# Patient Record
Sex: MALE | Race: WHITE | NOT HISPANIC OR LATINO | Employment: OTHER | URBAN - METROPOLITAN AREA
[De-identification: names, ages, dates, MRNs, and addresses within clinical notes are randomized per-mention and may not be internally consistent; named-entity substitution may affect disease eponyms.]

---

## 2017-03-27 ENCOUNTER — OFFICE VISIT (OUTPATIENT)
Dept: CARDIOLOGY | Facility: CLINIC | Age: 72
End: 2017-03-27

## 2017-03-27 VITALS
HEART RATE: 60 BPM | WEIGHT: 191.2 LBS | SYSTOLIC BLOOD PRESSURE: 102 MMHG | DIASTOLIC BLOOD PRESSURE: 62 MMHG | BODY MASS INDEX: 30.73 KG/M2 | HEIGHT: 66 IN

## 2017-03-27 DIAGNOSIS — I10 ESSENTIAL HYPERTENSION: ICD-10-CM

## 2017-03-27 DIAGNOSIS — I82.409 DEEP VEIN THROMBOSIS (DVT) OF LOWER EXTREMITY, UNSPECIFIED CHRONICITY, UNSPECIFIED LATERALITY, UNSPECIFIED VEIN (HCC): ICD-10-CM

## 2017-03-27 DIAGNOSIS — I25.810 CORONARY ARTERY DISEASE INVOLVING CORONARY BYPASS GRAFT OF NATIVE HEART WITHOUT ANGINA PECTORIS: Primary | ICD-10-CM

## 2017-03-27 DIAGNOSIS — I71.40 ABDOMINAL AORTIC ANEURYSM (AAA) WITHOUT RUPTURE (HCC): ICD-10-CM

## 2017-03-27 PROCEDURE — 99213 OFFICE O/P EST LOW 20 MIN: CPT | Performed by: NURSE PRACTITIONER

## 2017-03-27 RX ORDER — WARFARIN SODIUM 5 MG/1
5 TABLET ORAL
COMMUNITY
End: 2021-12-07 | Stop reason: HOSPADM

## 2017-03-27 NOTE — PROGRESS NOTES
Subjective:     Encounter Date:03/27/2017      Patient ID: Cal Frausto is a 71 y.o. male.    Chief Complaint: Follow-up for CAD, DVT, AAA.    PROBLEM LIST:  1.  Coronary artery disease:  a. April 1989, non-ST elevated  myocardial infarction with cardiac catheterization revealing an occluded LAD.  RCA within normal limits and within normal limits circumflex.   b. March 2004, abnormal Cardiolite with subsequent cardiac catheterization revealing multivessel disease.  c.  On 04/19/2004, coronary artery bypass grafting x3 with Dr. Cardona with a LIMA to the LAD, a saphenous vein graft to the ramus and OM1, and a saphenous vein graft to the PDA.   d. November 2004, cardiac catheterization which revealed an occluded saphenous  vein graft to the RCA.  There was an 80% left main with an occluded LAD with a patent LIMA graft to the LAD.  There was also an occluded OM1 with a patent saphenous vein graft to the OM.    e. Angina, cardiac cath, 07/30/2013, with 100% RCA.  Occluded  SVG.  With 100% LAD.  Patent LINDSEY with distal LAD occlusion.  Widely patent SVG to ramus and OM-1, filling other natives of the collaterals with normal LVEF.  2. DVT  a. Duplex venous lower extremity, 9/19/2016: · Acute left lower extremity deep vein thrombosis noted in the posterial tibial. Acute left lower extremity superficial thrombophlebitis noted in the greater saphenous (above knee) and varicosity (below knee). All other left sided veins appeared normal. Chronic left lower extremity superficial thrombophlebitis noted in the greater saphenous (below knee).  3. Hypertension.  4. Dyslipidemia.  5. AAA  a. Abdominal CT without contrast 9/18/2016: No acute findings.  Infrarenal AA unchanged.  b. Abdominal CT without contrast 5/17/2016: 4.6 x 4.3 cm infrarenal abdominal aortic aneurysm, no evidence of rupture.  6. Type 2 diabetes.   7. Ongoing tobacco abuse.  8. History of alcohol abuse, none since 1977.  9. COPD with nocturnal oxygen  use.  10. Arthritis.  11. Hypothyroidism.  12. History of childhood seizures.  13. Left wrist cyst removal x2.  14. Left carpal tunnel surgery.  15. Bell’s palsy in 2012.      History of Present Illness  Patient returns today for follow up with a history of CAD, hypertension, abdominal aortic aneurysm, and DVT.  Since last being seen he is overall done well.  His Xarelto therapy was switched to warfarin secondary to cost issues.  He notes that he has been doing well with this.  Currently denies any chest pain, pressure, tightness.  Denies any increasing shortness of breath.  Denies any syncope, near-syncope, edema.  He still continues to smoke cigarettes and per his report has no intentions of quitting.    No Known Allergies      Current Outpatient Prescriptions:   •  aspirin 81 MG tablet, Take 81 mg by mouth daily., Disp: , Rfl:   •  atenolol (TENORMIN) 50 MG tablet, Take 50 mg by mouth daily., Disp: , Rfl:   •  fenofibrate 160 MG tablet, Take 160 mg by mouth daily., Disp: , Rfl:   •  glipiZIDE (GLUCOTROL) 10 MG tablet, Take 10 mg by mouth daily., Disp: , Rfl:   •  isosorbide mononitrate (IMDUR) 60 MG 24 hr tablet, Take 60 mg by mouth daily., Disp: , Rfl:   •  levothyroxine (SYNTHROID, LEVOTHROID) 112 MCG tablet, Take 112 mcg by mouth daily., Disp: , Rfl:   •  losartan-hydrochlorothiazide (HYZAAR) 100-25 MG per tablet, Take 1 tablet by mouth daily., Disp: , Rfl:   •  PIOGLITAZONE HCL PO, Take 15 mg by mouth daily., Disp: , Rfl:   •  rivaroxaban (XARELTO) 15 MG tablet, Take 20 mg by mouth daily., Disp: , Rfl:   •  rosuvastatin (CRESTOR) 20 MG tablet, Take 1 tablet by mouth daily., Disp: 90 tablet, Rfl: 3  •  thyroid (ARMOUR) 15 MG tablet, Take 15 mg by mouth daily., Disp: , Rfl:     The following portions of the patient's history were reviewed and updated as appropriate: allergies, current medications, past family history, past medical history, past social history, past surgical history and problem list.    Review  "of Systems   Constitution: Negative.   Cardiovascular: Negative.    Respiratory: Negative.    Hematologic/Lymphatic: Negative for bleeding problem. Does not bruise/bleed easily.   Skin: Negative for rash.   Musculoskeletal: Negative for muscle weakness and myalgias.   Gastrointestinal: Negative for heartburn, nausea and vomiting.   Neurological: Negative.           Objective:   /62 (BP Location: Left arm, Patient Position: Sitting)  Pulse 60  Ht 66\" (167.6 cm)  Wt 191 lb 3.2 oz (86.7 kg)  BMI 30.86 kg/m2      Physical Exam   Constitutional: He is oriented to person, place, and time. He appears well-developed and well-nourished.   HENT:   Mouth/Throat: Oropharynx is clear and moist.   Neck: No JVD present. Carotid bruit is not present. No thyromegaly present.   Cardiovascular: Regular rhythm, S1 normal, S2 normal, normal heart sounds and intact distal pulses.    Pulmonary/Chest: Breath sounds normal.   Abdominal: Soft. Bowel sounds are normal. He exhibits no mass. There is no tenderness.   Musculoskeletal: He exhibits no edema.   Neurological: He is alert and oriented to person, place, and time.   Skin: Skin is warm and dry. No rash noted.       Lab Review:    Procedures        Assessment:   Diagnoses and all orders for this visit:    Coronary artery disease involving coronary bypass graft of native heart without angina pectoris    Acute deep vein thrombosis (DVT) of left lower extremity, unspecified vein    Essential hypertension    Abdominal aortic aneurysm (AAA) without rupture        Plan:  1. We'll make no changes to the patient's current medication regimen at this time.  2. Encouraged  And discussed smoking cessation.  Patient notes he is not interested.  3. Revisit in 6 MO with a CTA of his abdomen and pelvis to follow-up on his infrarenal abdominal aneurysm.  4. Lifelong anticoagulation    PEGGY Jj    "

## 2017-10-16 ENCOUNTER — HOSPITAL ENCOUNTER (OUTPATIENT)
Dept: CT IMAGING | Facility: HOSPITAL | Age: 72
Discharge: HOME | End: 2017-10-16
Admitting: NURSE PRACTITIONER

## 2017-10-16 ENCOUNTER — OFFICE VISIT (OUTPATIENT)
Dept: CARDIOLOGY | Facility: CLINIC | Age: 72
End: 2017-10-16

## 2017-10-16 VITALS
DIASTOLIC BLOOD PRESSURE: 78 MMHG | SYSTOLIC BLOOD PRESSURE: 140 MMHG | BODY MASS INDEX: 31.34 KG/M2 | WEIGHT: 195 LBS | HEIGHT: 66 IN | HEART RATE: 56 BPM | OXYGEN SATURATION: 96 %

## 2017-10-16 DIAGNOSIS — I82.402 ACUTE DEEP VEIN THROMBOSIS (DVT) OF LEFT LOWER EXTREMITY, UNSPECIFIED VEIN (HCC): ICD-10-CM

## 2017-10-16 DIAGNOSIS — E78.5 DYSLIPIDEMIA: ICD-10-CM

## 2017-10-16 DIAGNOSIS — I10 ESSENTIAL HYPERTENSION: ICD-10-CM

## 2017-10-16 DIAGNOSIS — I25.810 CORONARY ARTERY DISEASE INVOLVING CORONARY BYPASS GRAFT OF NATIVE HEART WITHOUT ANGINA PECTORIS: Primary | ICD-10-CM

## 2017-10-16 DIAGNOSIS — I71.40 ABDOMINAL AORTIC ANEURYSM (AAA) WITHOUT RUPTURE (HCC): ICD-10-CM

## 2017-10-16 PROCEDURE — 74174 CTA ABD&PLVS W/CONTRAST: CPT

## 2017-10-16 PROCEDURE — 99214 OFFICE O/P EST MOD 30 MIN: CPT | Performed by: INTERNAL MEDICINE

## 2017-10-16 PROCEDURE — 0 IOPAMIDOL PER 1 ML: Performed by: NURSE PRACTITIONER

## 2017-10-16 PROCEDURE — 82565 ASSAY OF CREATININE: CPT

## 2017-10-16 RX ORDER — WARFARIN SODIUM 4 MG/1
TABLET ORAL
Status: ON HOLD | COMMUNITY
Start: 2017-10-09 | End: 2021-12-06

## 2017-10-16 RX ADMIN — IOPAMIDOL 95 ML: 755 INJECTION, SOLUTION INTRAVENOUS at 10:54

## 2017-10-16 NOTE — PROGRESS NOTES
Subjective:     Encounter Date:10/16/2017      Patient ID: Cal Frausto is a 72 y.o. male.    Chief Complaint: Coronary Artery Disease    PROBLEM LIST:  1.  Coronary artery disease:  a. April 1989, non-ST elevated  myocardial infarction with cardiac catheterization revealing an occluded LAD.  RCA within normal limits and within normal limits circumflex.   b. March 2004, abnormal Cardiolite with subsequent cardiac catheterization revealing multivessel disease.  c.  On 04/19/2004, coronary artery bypass grafting x3 with Dr. Cardona with a LIMA to the LAD, a saphenous vein graft to the ramus and OM1, and a saphenous vein graft to the PDA.   d. November 2004, cardiac catheterization which revealed an occluded saphenous  vein graft to the RCA.  There was an 80% left main with an occluded LAD with a patent LIMA graft to the LAD.  There was also an occluded OM1 with a patent saphenous vein graft to the OM.    e. Angina, cardiac cath, 07/30/2013, with 100% RCA.  Occluded  SVG.  With 100% LAD.  Patent LINDSEY with distal LAD occlusion.  Widely patent SVG to ramus and OM-1, filling other natives of the collaterals with normal LVEF.  2. DVT  a. Idiopathic Duplex venous lower extremity, 9/19/2016: ·Acute left lower extremity deep vein thrombosis noted in the posterial tibial. Acute left lower extremity superficial thrombophlebitis noted in the greater saphenous (above knee) and varicosity (below knee). Chronic left lower extremity superficial thrombophlebitis noted in the greater saphenous (below knee).  3. Hypertension.  4. Dyslipidemia.  5. AAA  a. CT, 10/16/17.  5.0 cm infrarenal AAA  b. Abdominal CT without contrast 5/17/2016: 4.6 x 4.3 cm infrarenal abdominal aortic aneurysm, no evidence of rupture.  6. Type 2 diabetes.   7. Ongoing tobacco abuse.  8. History of alcohol abuse, none since 1977.  9. COPD with nocturnal oxygen use.  10. Arthritis.  11. Hypothyroidism.  12. History of childhood seizures.  13. Left wrist cyst  removal x2.  14. Left carpal tunnel surgery.  15. Bell’s palsy in 2012.    History of Present Illness  Cal A High returns today for a 6 month follow up with a history of coronary artery disease, DVT, hypertension and dyslipidemia. Since last visit he has been doing well from a cardiovascular standpoint. Denies any exertional chest pain, shortness of breath, orthopnea, PND, or palpitations.  Patient feels good    No Known Allergies      Current Outpatient Prescriptions:   •  aspirin 81 MG tablet, Take 81 mg by mouth daily., Disp: , Rfl:   •  atenolol (TENORMIN) 50 MG tablet, Take 50 mg by mouth daily., Disp: , Rfl:   •  fenofibrate 160 MG tablet, Take 160 mg by mouth daily., Disp: , Rfl:   •  glipiZIDE (GLUCOTROL) 10 MG tablet, Take 10 mg by mouth daily., Disp: , Rfl:   •  isosorbide mononitrate (IMDUR) 60 MG 24 hr tablet, Take 60 mg by mouth daily., Disp: , Rfl:   •  levothyroxine (SYNTHROID, LEVOTHROID) 112 MCG tablet, Take 112 mcg by mouth daily., Disp: , Rfl:   •  losartan-hydrochlorothiazide (HYZAAR) 100-25 MG per tablet, Take 1 tablet by mouth daily., Disp: , Rfl:   •  O2 (OXYGEN), Inhale 2 L/min Every Night., Disp: , Rfl:   •  PIOGLITAZONE HCL PO, Take 15 mg by mouth daily., Disp: , Rfl:   •  rosuvastatin (CRESTOR) 20 MG tablet, Take 1 tablet by mouth daily., Disp: 90 tablet, Rfl: 3  •  thyroid (ARMOUR) 15 MG tablet, Take 15 mg by mouth daily., Disp: , Rfl:   •  warfarin (COUMADIN) 3 MG tablet, Take 3 mg by mouth Daily., Disp: , Rfl:   No current facility-administered medications for this visit.     The following portions of the patient's history were reviewed and updated as appropriate: allergies, current medications, past family history, past medical history, past social history, past surgical history and problem list.    Review of Systems   Constitution: Negative.   Cardiovascular: Negative.    Respiratory: Negative.    Hematologic/Lymphatic: Negative for bleeding problem. Does not bruise/bleed easily.    Skin: Negative for rash.   Musculoskeletal: Negative for muscle weakness and myalgias.   Gastrointestinal: Negative for heartburn, nausea and vomiting.   Neurological: Negative.         Objective:   There were no vitals filed for this visit.      Physical Exam   Constitutional: He is oriented to person, place, and time. He appears well-developed and well-nourished.   HENT:   Mouth/Throat: Oropharynx is clear and moist.   Neck: No JVD present. Carotid bruit is not present. No thyromegaly present.   Cardiovascular: Regular rhythm, S1 normal, S2 normal, normal heart sounds and intact distal pulses.  Exam reveals no gallop, no S3 and no S4.    No murmur heard.  Pulses:       Carotid pulses are 2+ on the right side, and 2+ on the left side.       Radial pulses are 2+ on the right side, and 2+ on the left side.   Pulmonary/Chest: Breath sounds normal.   Abdominal: Soft. Bowel sounds are normal. He exhibits no mass. There is no tenderness.   Musculoskeletal: He exhibits no edema.   Neurological: He is alert and oriented to person, place, and time.   Skin: Skin is warm and dry. No rash noted.     Lab Review:    Procedures        Assessment:   Cal was seen today for coronary artery disease.    Diagnoses and all orders for this visit:    Coronary artery disease involving coronary bypass graft of native heart without angina pectoris    Essential hypertension    Acute deep vein thrombosis (DVT) of left lower extremity, unspecified vein    Impression:  1. CAD Stable without angina  2. AAA appears to be slowly enlarging, now 5.0 cm.  No evidence of rupture.  3. Hypertension is well-controlled.  4. 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  5. Dyslipidemia controlled    Plan:  1. Continue current medications.  2. Referred to Dr. Isabel for EVAR  3. Revisit in 6 MO, or sooner as needed.    Scribed for Keegan Aguilera MD by Cole Han. 10/16/2017  1:42 PM    Keegan VALENZUELA MD, personally performed the  services described in this documentation as scribed by the above individual in my presence, and it is both accurate and complete      Please note that portions of this note may have been completed with a voice recognition program. Efforts were made to edit the dictations, but occasionally words are mistranscribed.

## 2017-10-18 LAB — CREAT BLDA-MCNC: 1.3 MG/DL (ref 0.6–1.3)

## 2018-04-16 ENCOUNTER — TELEPHONE (OUTPATIENT)
Dept: CARDIOLOGY | Facility: CLINIC | Age: 73
End: 2018-04-16

## 2018-04-16 RX ORDER — NITROGLYCERIN 0.4 MG/1
TABLET SUBLINGUAL
Qty: 25 TABLET | Refills: 3 | Status: SHIPPED | OUTPATIENT
Start: 2018-04-16

## 2018-04-16 NOTE — TELEPHONE ENCOUNTER
Got a call from Dr. Harsha Smith's office and says that he was mowing the yard had some angina.   apparently he had some NTG which was old so he threw it away.  I talk with the patient and he says that he has had some of this chest pain ever since he had the CABG.   Did order him so new NTG and instructed him to make sure he has it with him and especially when mowing.  He does have an appointment on 4/30/18.

## 2018-04-30 ENCOUNTER — OFFICE VISIT (OUTPATIENT)
Dept: CARDIOLOGY | Facility: CLINIC | Age: 73
End: 2018-04-30

## 2018-04-30 VITALS
WEIGHT: 199.8 LBS | HEART RATE: 61 BPM | DIASTOLIC BLOOD PRESSURE: 64 MMHG | BODY MASS INDEX: 32.11 KG/M2 | SYSTOLIC BLOOD PRESSURE: 112 MMHG | HEIGHT: 66 IN

## 2018-04-30 DIAGNOSIS — I71.40 ABDOMINAL AORTIC ANEURYSM (AAA) WITHOUT RUPTURE (HCC): ICD-10-CM

## 2018-04-30 DIAGNOSIS — I10 ESSENTIAL HYPERTENSION: ICD-10-CM

## 2018-04-30 DIAGNOSIS — E78.5 DYSLIPIDEMIA: ICD-10-CM

## 2018-04-30 DIAGNOSIS — I25.810 CORONARY ARTERY DISEASE INVOLVING CORONARY BYPASS GRAFT OF NATIVE HEART WITHOUT ANGINA PECTORIS: Primary | ICD-10-CM

## 2018-04-30 DIAGNOSIS — Z72.0 TOBACCO ABUSE: ICD-10-CM

## 2018-04-30 DIAGNOSIS — I82.402 ACUTE DEEP VEIN THROMBOSIS (DVT) OF LEFT LOWER EXTREMITY, UNSPECIFIED VEIN (HCC): ICD-10-CM

## 2018-04-30 PROCEDURE — 99214 OFFICE O/P EST MOD 30 MIN: CPT | Performed by: INTERNAL MEDICINE

## 2018-04-30 NOTE — PROGRESS NOTES
.    Subjective:     Encounter Date:04/30/2018      Patient ID: Cal Frausto is a 72 y.o. male.  PCP:Harsha Smith MD    Chief Complaint: Coronary Artery Disease; Hypertension; and Shortness of Breath      PROBLEM LIST:  1.  Coronary artery disease:  a. April 1989, non-ST elevated  myocardial infarction with cardiac catheterization revealing an occluded LAD.  RCA within normal limits and within normal limits circumflex.   b. March 2004, abnormal Cardiolite with subsequent cardiac catheterization revealing multivessel disease.  c.  On 04/19/2004, coronary artery bypass grafting x3 with Dr. Cardona with a LIMA to the LAD, a saphenous vein graft to the ramus and OM1, and a saphenous vein graft to the PDA.   d. November 2004, cardiac catheterization which revealed an occluded saphenous  vein graft to the RCA.  There was an 80% left main with an occluded LAD with a patent LIMA graft to the LAD.  There was also an occluded OM1 with a patent saphenous vein graft to the OM.    e. Angina, cardiac cath, 07/30/2013, with 100% RCA.  Occluded  SVG.  With 100% LAD.  Patent LINDSEY with distal LAD occlusion.  Widely patent SVG to ramus and OM-1, filling other natives of the collaterals with normal LVEF.  2. DVT  a. Idiopathic Duplex venous lower extremity, 9/19/2016: ·Acute left lower extremity deep vein thrombosis noted in the posterial tibial. Acute left lower extremity superficial thrombophlebitis noted in the greater saphenous (above knee) and varicosity (below knee). Chronic left lower extremity superficial thrombophlebitis noted in the greater saphenous (below knee).  3. Hypertension.  4. Dyslipidemia.  5. AAA  a. CT, 10/16/17.  5.0 cm infrarenal AAA, followed by Dr. Isabel  6. Type 2 diabetes.   7. Ongoing tobacco abuse.  8. History of alcohol abuse, none since 1977.  9. COPD with nocturnal oxygen use.  10. Arthritis.  11. Hypothyroidism.  12. History of childhood seizures.  13. Left wrist cyst removal x2.  14. Left  carpal tunnel surgery.  15. Bell’s palsy in 2012.    History of Present Illness  Patient returns today for follow up with a history of CAD, DVT, hypertension, and dyslipidemia. Since last visit had an episode of chest pain while mowing his lawn. Experienced this episode long term through mowing his lawn, and continued despite his chest pain. His last episode before this most recent episode, was a couple years ago. His pain resolved spontaneously. Per wife, he breathes heavily and gets confused often. Denies any orthopnea, PND, or palpitations.  This is identical to his previous angina.    No Known Allergies      Current Outpatient Prescriptions:   •  aspirin 81 MG tablet, Take 81 mg by mouth daily., Disp: , Rfl:   •  atenolol (TENORMIN) 50 MG tablet, Take 50 mg by mouth daily., Disp: , Rfl:   •  fenofibrate 160 MG tablet, Take 160 mg by mouth daily., Disp: , Rfl:   •  glipiZIDE (GLUCOTROL) 10 MG tablet, Take 10 mg by mouth daily., Disp: , Rfl:   •  isosorbide mononitrate (IMDUR) 60 MG 24 hr tablet, Take 60 mg by mouth daily., Disp: , Rfl:   •  levothyroxine (SYNTHROID, LEVOTHROID) 112 MCG tablet, Take 112 mcg by mouth daily., Disp: , Rfl:   •  losartan-hydrochlorothiazide (HYZAAR) 100-25 MG per tablet, Take 1 tablet by mouth daily., Disp: , Rfl:   •  nitroglycerin (NITROSTAT) 0.4 MG SL tablet, 1 under the tongue as needed for angina, may repeat q5mins for up three doses, Disp: 25 tablet, Rfl: 3  •  O2 (OXYGEN), Inhale 2 L/min Every Night., Disp: , Rfl:   •  PIOGLITAZONE HCL PO, Take 15 mg by mouth daily., Disp: , Rfl:   •  rosuvastatin (CRESTOR) 20 MG tablet, Take 1 tablet by mouth daily., Disp: 90 tablet, Rfl: 3  •  thyroid (ARMOUR) 15 MG tablet, Take 15 mg by mouth daily., Disp: , Rfl:   •  warfarin (COUMADIN) 3 MG tablet, Take 3 mg by mouth Daily., Disp: , Rfl:   •  warfarin (COUMADIN) 4 MG tablet, Alternating with 3 mg, Disp: , Rfl:     The following portions of the patient's history were reviewed and updated as  "appropriate: allergies, current medications, past family history, past medical history, past social history, past surgical history and problem list.    Review of Systems   Constitution: Negative.   Cardiovascular: Positive for chest pain.   Respiratory: Negative.    Hematologic/Lymphatic: Negative for bleeding problem. Does not bruise/bleed easily.   Skin: Negative for rash.   Musculoskeletal: Negative for muscle weakness and myalgias.   Gastrointestinal: Negative for heartburn, nausea and vomiting.   Neurological: Negative.           Objective:     Vitals:    04/30/18 1431   BP: 112/64   BP Location: Left arm   Patient Position: Sitting   Pulse: 61   Weight: 90.6 kg (199 lb 12.8 oz)   Height: 167.6 cm (66\")         Physical Exam   Constitutional: He is oriented to person, place, and time. He appears well-developed and well-nourished.   HENT:   Mouth/Throat: Oropharynx is clear and moist.   Neck: No JVD present. Carotid bruit is not present. No thyromegaly present.   Cardiovascular: Regular rhythm, S1 normal, S2 normal, normal heart sounds and intact distal pulses.  Exam reveals no gallop, no S3 and no S4.    No murmur heard.  Pulses:       Carotid pulses are 2+ on the right side, and 2+ on the left side.       Radial pulses are 2+ on the right side, and 2+ on the left side.   Positive left Rafael's   Pulmonary/Chest: Breath sounds normal.   Abdominal: Soft. Bowel sounds are normal. He exhibits no mass. There is no tenderness.   Musculoskeletal: He exhibits no edema.   Neurological: He is alert and oriented to person, place, and time.   Skin: Skin is warm and dry. No rash noted.       Lab Review:    Procedures        Assessment:   Cal was seen today for coronary artery disease, hypertension and shortness of breath.    Diagnoses and all orders for this visit:    Coronary artery disease involving coronary bypass graft of native heart without angina pectoris    Essential hypertension    Dyslipidemia    Acute deep " vein thrombosis (DVT) of left lower extremity, unspecified vein        Impression  1. CAD: New-onset functional class III angina.  Unstable pattern, known diffuse vascular disease with single functioning graft.  2. Hypertension: Well controlled with current medication regimen.  3. Dyslipidemia: LDL goal < 70. Continue rosuvastatin (Crestor) 20 mg daily.  4.     Plan:  1. Schedule cardiac cath to assess etiology of his chest pain.  Via left radial artery Hold coumadin 2 days before.  2. Restrict activity.  3. Continue current medications.  4. Revisit after procedure, or sooner as needed.    Scribed for Keegan Aguilera MD by Jessee Castrejon. 4/30/2018  2:56 PM    I, Keegan Aguilera MD, personally performed the services described in this documentation as scribed by the above individual in my presence, and it is both accurate and complete      Please note that portions of this note may have been completed with a voice recognition program. Efforts were made to edit the dictations, but occasionally words are mistranscribed.

## 2018-05-01 PROBLEM — I25.810 CORONARY ARTERY DISEASE INVOLVING CORONARY BYPASS GRAFT OF NATIVE HEART WITHOUT ANGINA PECTORIS: Status: ACTIVE | Noted: 2018-05-01

## 2018-05-02 ENCOUNTER — PREP FOR SURGERY (OUTPATIENT)
Dept: OTHER | Facility: HOSPITAL | Age: 73
End: 2018-05-02

## 2018-05-02 DIAGNOSIS — I20.9 ANGINA PECTORIS (HCC): Primary | ICD-10-CM

## 2018-05-02 RX ORDER — NITROGLYCERIN 0.4 MG/1
0.4 TABLET SUBLINGUAL
Status: CANCELLED | OUTPATIENT
Start: 2018-05-02

## 2018-05-02 RX ORDER — ASPIRIN 325 MG
325 TABLET ORAL ONCE
Status: CANCELLED | OUTPATIENT
Start: 2018-05-02 | End: 2018-05-02

## 2018-05-02 RX ORDER — ONDANSETRON 2 MG/ML
4 INJECTION INTRAMUSCULAR; INTRAVENOUS EVERY 6 HOURS PRN
Status: CANCELLED | OUTPATIENT
Start: 2018-05-02

## 2018-05-02 RX ORDER — ACETAMINOPHEN 325 MG/1
650 TABLET ORAL EVERY 4 HOURS PRN
Status: CANCELLED | OUTPATIENT
Start: 2018-05-02

## 2018-05-02 RX ORDER — ASPIRIN 325 MG
325 TABLET, DELAYED RELEASE (ENTERIC COATED) ORAL DAILY
Status: CANCELLED | OUTPATIENT
Start: 2018-05-03

## 2018-05-02 RX ORDER — SODIUM CHLORIDE 0.9 % (FLUSH) 0.9 %
1-10 SYRINGE (ML) INJECTION AS NEEDED
Status: CANCELLED | OUTPATIENT
Start: 2018-05-02

## 2018-05-04 ENCOUNTER — HOSPITAL ENCOUNTER (OUTPATIENT)
Facility: HOSPITAL | Age: 73
Setting detail: HOSPITAL OUTPATIENT SURGERY
Discharge: HOME | End: 2018-05-04
Attending: INTERNAL MEDICINE | Admitting: INTERNAL MEDICINE

## 2018-05-04 ENCOUNTER — APPOINTMENT (OUTPATIENT)
Dept: GENERAL RADIOLOGY | Facility: HOSPITAL | Age: 73
End: 2018-05-04

## 2018-05-04 VITALS
BODY MASS INDEX: 31.64 KG/M2 | HEART RATE: 64 BPM | SYSTOLIC BLOOD PRESSURE: 112 MMHG | WEIGHT: 196.87 LBS | TEMPERATURE: 97.8 F | RESPIRATION RATE: 16 BRPM | HEIGHT: 66 IN | DIASTOLIC BLOOD PRESSURE: 86 MMHG | OXYGEN SATURATION: 92 %

## 2018-05-04 DIAGNOSIS — I20.9 ANGINA PECTORIS (HCC): ICD-10-CM

## 2018-05-04 DIAGNOSIS — I25.810 CORONARY ARTERY DISEASE INVOLVING CORONARY BYPASS GRAFT OF NATIVE HEART WITHOUT ANGINA PECTORIS: ICD-10-CM

## 2018-05-04 LAB
ALBUMIN SERPL-MCNC: 4.2 G/DL (ref 3.2–4.8)
ALBUMIN/GLOB SERPL: 1.7 G/DL (ref 1.5–2.5)
ALP SERPL-CCNC: 44 U/L (ref 25–100)
ALT SERPL W P-5'-P-CCNC: 16 U/L (ref 7–40)
ANION GAP SERPL CALCULATED.3IONS-SCNC: 5 MMOL/L (ref 3–11)
ARTICHOKE IGE QN: 110 MG/DL (ref 0–130)
AST SERPL-CCNC: 17 U/L (ref 0–33)
BILIRUB SERPL-MCNC: 0.4 MG/DL (ref 0.3–1.2)
BUN BLD-MCNC: 20 MG/DL (ref 9–23)
BUN/CREAT SERPL: 18.2 (ref 7–25)
CALCIUM SPEC-SCNC: 9.1 MG/DL (ref 8.7–10.4)
CHLORIDE SERPL-SCNC: 100 MMOL/L (ref 99–109)
CHOLEST SERPL-MCNC: 163 MG/DL (ref 0–200)
CO2 SERPL-SCNC: 30 MMOL/L (ref 20–31)
CREAT BLD-MCNC: 1.1 MG/DL (ref 0.6–1.3)
DEPRECATED RDW RBC AUTO: 53.6 FL (ref 37–54)
ERYTHROCYTE [DISTWIDTH] IN BLOOD BY AUTOMATED COUNT: 16.6 % (ref 11.3–14.5)
GFR SERPL CREATININE-BSD FRML MDRD: 66 ML/MIN/1.73
GLOBULIN UR ELPH-MCNC: 2.5 GM/DL
GLUCOSE BLD-MCNC: 132 MG/DL (ref 70–100)
GLUCOSE BLDC GLUCOMTR-MCNC: 90 MG/DL (ref 70–130)
HBA1C MFR BLD: 7.3 % (ref 4.8–5.6)
HCT VFR BLD AUTO: 46.2 % (ref 38.9–50.9)
HDLC SERPL-MCNC: 37 MG/DL (ref 40–60)
HGB BLD-MCNC: 15 G/DL (ref 13.1–17.5)
INR PPP: 1.1 (ref 0.91–1.09)
MCH RBC QN AUTO: 28.7 PG (ref 27–31)
MCHC RBC AUTO-ENTMCNC: 32.5 G/DL (ref 32–36)
MCV RBC AUTO: 88.3 FL (ref 80–99)
PLATELET # BLD AUTO: 153 10*3/MM3 (ref 150–450)
PMV BLD AUTO: 11 FL (ref 6–12)
POTASSIUM BLD-SCNC: 3.8 MMOL/L (ref 3.5–5.5)
PROT SERPL-MCNC: 6.7 G/DL (ref 5.7–8.2)
PROTHROMBIN TIME: 11.5 SECONDS (ref 9.6–11.5)
RBC # BLD AUTO: 5.23 10*6/MM3 (ref 4.2–5.76)
SODIUM BLD-SCNC: 135 MMOL/L (ref 132–146)
TRIGL SERPL-MCNC: 151 MG/DL (ref 0–150)
WBC NRBC COR # BLD: 6.72 10*3/MM3 (ref 3.5–10.8)

## 2018-05-04 PROCEDURE — C1769 GUIDE WIRE: HCPCS | Performed by: INTERNAL MEDICINE

## 2018-05-04 PROCEDURE — 80061 LIPID PANEL: CPT | Performed by: NURSE PRACTITIONER

## 2018-05-04 PROCEDURE — C9600 PERC DRUG-EL COR STENT SING: HCPCS | Performed by: INTERNAL MEDICINE

## 2018-05-04 PROCEDURE — 85610 PROTHROMBIN TIME: CPT | Performed by: NURSE PRACTITIONER

## 2018-05-04 PROCEDURE — 93459 L HRT ART/GRFT ANGIO: CPT | Performed by: INTERNAL MEDICINE

## 2018-05-04 PROCEDURE — C1887 CATHETER, GUIDING: HCPCS | Performed by: INTERNAL MEDICINE

## 2018-05-04 PROCEDURE — C1894 INTRO/SHEATH, NON-LASER: HCPCS | Performed by: INTERNAL MEDICINE

## 2018-05-04 PROCEDURE — 83036 HEMOGLOBIN GLYCOSYLATED A1C: CPT | Performed by: NURSE PRACTITIONER

## 2018-05-04 PROCEDURE — 82962 GLUCOSE BLOOD TEST: CPT

## 2018-05-04 PROCEDURE — 0 IOPAMIDOL PER 1 ML: Performed by: INTERNAL MEDICINE

## 2018-05-04 PROCEDURE — 36415 COLL VENOUS BLD VENIPUNCTURE: CPT

## 2018-05-04 PROCEDURE — 71045 X-RAY EXAM CHEST 1 VIEW: CPT

## 2018-05-04 PROCEDURE — 25010000002 MIDAZOLAM PER 1 MG: Performed by: INTERNAL MEDICINE

## 2018-05-04 PROCEDURE — C1725 CATH, TRANSLUMIN NON-LASER: HCPCS | Performed by: INTERNAL MEDICINE

## 2018-05-04 PROCEDURE — 92928 PRQ TCAT PLMT NTRAC ST 1 LES: CPT | Performed by: INTERNAL MEDICINE

## 2018-05-04 PROCEDURE — 85027 COMPLETE CBC AUTOMATED: CPT | Performed by: NURSE PRACTITIONER

## 2018-05-04 PROCEDURE — 25010000002 BIVALIRUDIN TRIFLUOROACETATE 250 MG RECONSTITUTED SOLUTION 1 EACH VIAL: Performed by: INTERNAL MEDICINE

## 2018-05-04 PROCEDURE — 25010000002 HEPARIN (PORCINE) PER 1000 UNITS: Performed by: INTERNAL MEDICINE

## 2018-05-04 PROCEDURE — C1874 STENT, COATED/COV W/DEL SYS: HCPCS | Performed by: INTERNAL MEDICINE

## 2018-05-04 PROCEDURE — 80053 COMPREHEN METABOLIC PANEL: CPT | Performed by: NURSE PRACTITIONER

## 2018-05-04 DEVICE — XIENCE ALPINE EVEROLIMUS ELUTING CORONARY STENT SYSTEM 3.00 MM X 12 MM / RAPID-EXCHANGE
Type: IMPLANTABLE DEVICE | Status: FUNCTIONAL
Brand: XIENCE ALPINE

## 2018-05-04 RX ORDER — LIDOCAINE HYDROCHLORIDE 10 MG/ML
INJECTION, SOLUTION EPIDURAL; INFILTRATION; INTRACAUDAL; PERINEURAL AS NEEDED
Status: DISCONTINUED | OUTPATIENT
Start: 2018-05-04 | End: 2018-05-04 | Stop reason: HOSPADM

## 2018-05-04 RX ORDER — HYDROCODONE BITARTRATE AND ACETAMINOPHEN 5; 325 MG/1; MG/1
1 TABLET ORAL EVERY 4 HOURS PRN
Status: DISCONTINUED | OUTPATIENT
Start: 2018-05-04 | End: 2018-05-04 | Stop reason: HOSPADM

## 2018-05-04 RX ORDER — ACETAMINOPHEN 325 MG/1
650 TABLET ORAL EVERY 4 HOURS PRN
Status: DISCONTINUED | OUTPATIENT
Start: 2018-05-04 | End: 2018-05-04 | Stop reason: HOSPADM

## 2018-05-04 RX ORDER — SODIUM CHLORIDE 0.9 % (FLUSH) 0.9 %
1-10 SYRINGE (ML) INJECTION AS NEEDED
Status: DISCONTINUED | OUTPATIENT
Start: 2018-05-04 | End: 2018-05-04 | Stop reason: HOSPADM

## 2018-05-04 RX ORDER — CLOPIDOGREL BISULFATE 75 MG/1
75 TABLET ORAL DAILY
Qty: 30 TABLET | Refills: 11 | Status: SHIPPED | OUTPATIENT
Start: 2018-05-04 | End: 2018-12-17

## 2018-05-04 RX ORDER — SODIUM CHLORIDE 9 MG/ML
1-3 INJECTION, SOLUTION INTRAVENOUS CONTINUOUS
Status: DISCONTINUED | OUTPATIENT
Start: 2018-05-04 | End: 2018-05-04 | Stop reason: HOSPADM

## 2018-05-04 RX ORDER — CLOPIDOGREL BISULFATE 75 MG/1
TABLET ORAL AS NEEDED
Status: DISCONTINUED | OUTPATIENT
Start: 2018-05-04 | End: 2018-05-04 | Stop reason: HOSPADM

## 2018-05-04 RX ORDER — MIDAZOLAM HYDROCHLORIDE 1 MG/ML
INJECTION INTRAMUSCULAR; INTRAVENOUS AS NEEDED
Status: DISCONTINUED | OUTPATIENT
Start: 2018-05-04 | End: 2018-05-04 | Stop reason: HOSPADM

## 2018-05-04 RX ORDER — ASPIRIN 325 MG
325 TABLET, DELAYED RELEASE (ENTERIC COATED) ORAL DAILY
Status: DISCONTINUED | OUTPATIENT
Start: 2018-05-05 | End: 2018-05-04 | Stop reason: HOSPADM

## 2018-05-04 RX ORDER — ONDANSETRON 2 MG/ML
4 INJECTION INTRAMUSCULAR; INTRAVENOUS EVERY 6 HOURS PRN
Status: DISCONTINUED | OUTPATIENT
Start: 2018-05-04 | End: 2018-05-04 | Stop reason: HOSPADM

## 2018-05-04 RX ORDER — NITROGLYCERIN 0.4 MG/1
0.4 TABLET SUBLINGUAL
Status: DISCONTINUED | OUTPATIENT
Start: 2018-05-04 | End: 2018-05-04 | Stop reason: HOSPADM

## 2018-05-04 RX ORDER — ASPIRIN 325 MG
325 TABLET ORAL ONCE
Status: COMPLETED | OUTPATIENT
Start: 2018-05-04 | End: 2018-05-04

## 2018-05-04 RX ADMIN — SODIUM CHLORIDE 3 ML/KG/HR: 9 INJECTION, SOLUTION INTRAVENOUS at 10:34

## 2018-05-04 RX ADMIN — ASPIRIN 325 MG ORAL TABLET 325 MG: 325 PILL ORAL at 10:33

## 2018-05-04 NOTE — H&P (VIEW-ONLY)
.    Subjective:     Encounter Date:04/30/2018      Patient ID: Cal Frausto is a 72 y.o. male.  PCP:Harsha Smith MD    Chief Complaint: Coronary Artery Disease; Hypertension; and Shortness of Breath      PROBLEM LIST:  1.  Coronary artery disease:  a. April 1989, non-ST elevated  myocardial infarction with cardiac catheterization revealing an occluded LAD.  RCA within normal limits and within normal limits circumflex.   b. March 2004, abnormal Cardiolite with subsequent cardiac catheterization revealing multivessel disease.  c.  On 04/19/2004, coronary artery bypass grafting x3 with Dr. Cardona with a LIMA to the LAD, a saphenous vein graft to the ramus and OM1, and a saphenous vein graft to the PDA.   d. November 2004, cardiac catheterization which revealed an occluded saphenous  vein graft to the RCA.  There was an 80% left main with an occluded LAD with a patent LIMA graft to the LAD.  There was also an occluded OM1 with a patent saphenous vein graft to the OM.    e. Angina, cardiac cath, 07/30/2013, with 100% RCA.  Occluded  SVG.  With 100% LAD.  Patent LINDSEY with distal LAD occlusion.  Widely patent SVG to ramus and OM-1, filling other natives of the collaterals with normal LVEF.  2. DVT  a. Idiopathic Duplex venous lower extremity, 9/19/2016: ·Acute left lower extremity deep vein thrombosis noted in the posterial tibial. Acute left lower extremity superficial thrombophlebitis noted in the greater saphenous (above knee) and varicosity (below knee). Chronic left lower extremity superficial thrombophlebitis noted in the greater saphenous (below knee).  3. Hypertension.  4. Dyslipidemia.  5. AAA  a. CT, 10/16/17.  5.0 cm infrarenal AAA, followed by Dr. Isabel  6. Type 2 diabetes.   7. Ongoing tobacco abuse.  8. History of alcohol abuse, none since 1977.  9. COPD with nocturnal oxygen use.  10. Arthritis.  11. Hypothyroidism.  12. History of childhood seizures.  13. Left wrist cyst removal x2.  14. Left  carpal tunnel surgery.  15. Bell’s palsy in 2012.    History of Present Illness  Patient returns today for follow up with a history of CAD, DVT, hypertension, and dyslipidemia. Since last visit had an episode of chest pain while mowing his lawn. Experienced this episode long-term through mowing his lawn, and continued despite his chest pain. His last episode before this most recent episode, was a couple years ago. His pain resolved spontaneously. Per wife, he breathes heavily and gets confused often. Denies any orthopnea, PND, or palpitations.  This is identical to his previous angina.    No Known Allergies      Current Outpatient Prescriptions:   •  aspirin 81 MG tablet, Take 81 mg by mouth daily., Disp: , Rfl:   •  atenolol (TENORMIN) 50 MG tablet, Take 50 mg by mouth daily., Disp: , Rfl:   •  fenofibrate 160 MG tablet, Take 160 mg by mouth daily., Disp: , Rfl:   •  glipiZIDE (GLUCOTROL) 10 MG tablet, Take 10 mg by mouth daily., Disp: , Rfl:   •  isosorbide mononitrate (IMDUR) 60 MG 24 hr tablet, Take 60 mg by mouth daily., Disp: , Rfl:   •  levothyroxine (SYNTHROID, LEVOTHROID) 112 MCG tablet, Take 112 mcg by mouth daily., Disp: , Rfl:   •  losartan-hydrochlorothiazide (HYZAAR) 100-25 MG per tablet, Take 1 tablet by mouth daily., Disp: , Rfl:   •  nitroglycerin (NITROSTAT) 0.4 MG SL tablet, 1 under the tongue as needed for angina, may repeat q5mins for up three doses, Disp: 25 tablet, Rfl: 3  •  O2 (OXYGEN), Inhale 2 L/min Every Night., Disp: , Rfl:   •  PIOGLITAZONE HCL PO, Take 15 mg by mouth daily., Disp: , Rfl:   •  rosuvastatin (CRESTOR) 20 MG tablet, Take 1 tablet by mouth daily., Disp: 90 tablet, Rfl: 3  •  thyroid (ARMOUR) 15 MG tablet, Take 15 mg by mouth daily., Disp: , Rfl:   •  warfarin (COUMADIN) 3 MG tablet, Take 3 mg by mouth Daily., Disp: , Rfl:   •  warfarin (COUMADIN) 4 MG tablet, Alternating with 3 mg, Disp: , Rfl:     The following portions of the patient's history were reviewed and updated as  "appropriate: allergies, current medications, past family history, past medical history, past social history, past surgical history and problem list.    Review of Systems   Constitution: Negative.   Cardiovascular: Positive for chest pain.   Respiratory: Negative.    Hematologic/Lymphatic: Negative for bleeding problem. Does not bruise/bleed easily.   Skin: Negative for rash.   Musculoskeletal: Negative for muscle weakness and myalgias.   Gastrointestinal: Negative for heartburn, nausea and vomiting.   Neurological: Negative.           Objective:     Vitals:    04/30/18 1431   BP: 112/64   BP Location: Left arm   Patient Position: Sitting   Pulse: 61   Weight: 90.6 kg (199 lb 12.8 oz)   Height: 167.6 cm (66\")         Physical Exam   Constitutional: He is oriented to person, place, and time. He appears well-developed and well-nourished.   HENT:   Mouth/Throat: Oropharynx is clear and moist.   Neck: No JVD present. Carotid bruit is not present. No thyromegaly present.   Cardiovascular: Regular rhythm, S1 normal, S2 normal, normal heart sounds and intact distal pulses.  Exam reveals no gallop, no S3 and no S4.    No murmur heard.  Pulses:       Carotid pulses are 2+ on the right side, and 2+ on the left side.       Radial pulses are 2+ on the right side, and 2+ on the left side.   Positive left Rafael's   Pulmonary/Chest: Breath sounds normal.   Abdominal: Soft. Bowel sounds are normal. He exhibits no mass. There is no tenderness.   Musculoskeletal: He exhibits no edema.   Neurological: He is alert and oriented to person, place, and time.   Skin: Skin is warm and dry. No rash noted.       Lab Review:    Procedures        Assessment:   Cal was seen today for coronary artery disease, hypertension and shortness of breath.    Diagnoses and all orders for this visit:    Coronary artery disease involving coronary bypass graft of native heart without angina pectoris    Essential hypertension    Dyslipidemia    Acute deep " vein thrombosis (DVT) of left lower extremity, unspecified vein        Impression  1. CAD: New-onset functional class III angina.  Unstable pattern, known diffuse vascular disease with single functioning graft.  2. Hypertension: Well controlled with current medication regimen.  3. Dyslipidemia: LDL goal < 70. Continue rosuvastatin (Crestor) 20 mg daily.  4.     Plan:  1. Schedule cardiac cath to assess etiology of his chest pain.  Via left radial artery Hold coumadin 2 days before.  2. Restrict activity.  3. Continue current medications.  4. Revisit after procedure, or sooner as needed.    Scribed for Keegan Aguilera MD by Jessee Castrejon. 4/30/2018  2:56 PM    I, Keegan Aguilera MD, personally performed the services described in this documentation as scribed by the above individual in my presence, and it is both accurate and complete      Please note that portions of this note may have been completed with a voice recognition program. Efforts were made to edit the dictations, but occasionally words are mistranscribed.

## 2018-05-04 NOTE — CONSULTS
Patient does not meet diabetes education order criteria, therefore patient was not seen for diabetes education at this time  A1c  7.3%.  Please re consult as needed.

## 2018-05-07 ENCOUNTER — DOCUMENTATION (OUTPATIENT)
Dept: CARDIAC REHAB | Facility: HOSPITAL | Age: 73
End: 2018-05-07

## 2018-05-07 NOTE — PROGRESS NOTES
Order received for Phase II Cardiac Rehab.  Patient received program information prior to discharge.  Staff to contact patient regarding scheduling.

## 2018-05-18 ENCOUNTER — DOCUMENTATION (OUTPATIENT)
Dept: CARDIAC REHAB | Facility: HOSPITAL | Age: 73
End: 2018-05-18

## 2018-05-18 NOTE — PROGRESS NOTES
Pt. Referred for Phase II Cardiac Rehab. Staff discussed benefits of exercise, program protocol, and educational material provided. Teach back verified.  Patient states he is not interested and refused to enroll in cardiac rehab at [facility] at this time.

## 2018-06-11 ENCOUNTER — OFFICE VISIT (OUTPATIENT)
Dept: CARDIOLOGY | Facility: CLINIC | Age: 73
End: 2018-06-11

## 2018-06-11 VITALS
HEIGHT: 66 IN | BODY MASS INDEX: 32.14 KG/M2 | WEIGHT: 200 LBS | DIASTOLIC BLOOD PRESSURE: 64 MMHG | SYSTOLIC BLOOD PRESSURE: 112 MMHG | HEART RATE: 56 BPM

## 2018-06-11 DIAGNOSIS — I10 ESSENTIAL HYPERTENSION: ICD-10-CM

## 2018-06-11 DIAGNOSIS — I25.708 CORONARY ARTERY DISEASE OF BYPASS GRAFT OF NATIVE HEART WITH STABLE ANGINA PECTORIS (HCC): Primary | ICD-10-CM

## 2018-06-11 DIAGNOSIS — E78.5 DYSLIPIDEMIA: ICD-10-CM

## 2018-06-11 DIAGNOSIS — Z72.0 TOBACCO ABUSE: ICD-10-CM

## 2018-06-11 PROCEDURE — 99214 OFFICE O/P EST MOD 30 MIN: CPT | Performed by: NURSE PRACTITIONER

## 2018-06-11 RX ORDER — ROSUVASTATIN CALCIUM 40 MG/1
40 TABLET, COATED ORAL NIGHTLY
Qty: 90 TABLET | Refills: 3 | Status: SHIPPED | OUTPATIENT
Start: 2018-06-11

## 2018-06-11 NOTE — PROGRESS NOTES
Subjective:     Encounter Date:06/11/2018      Patient ID: Cal Frausto is a 72 y.o. male.    Chief Complaint: Coronary Artery Disease    PROBLEM LIST:  1.  Coronary artery disease:  a. April 1989, non-ST elevated  myocardial infarction with cardiac catheterization revealing an occluded LAD.  RCA within normal limits and within normal limits circumflex.   b. March 2004, abnormal Cardiolite with subsequent cardiac catheterization revealing multivessel disease.  c.  On 04/19/2004, coronary artery bypass grafting x3 with Dr. Cardona with a LIMA to the LAD, a saphenous vein graft to the ramus and OM1, and a saphenous vein graft to the PDA.   d. November 2004, cardiac catheterization which revealed an occluded saphenous  vein graft to the RCA.  There was an 80% left main with an occluded LAD with a patent LIMA graft to the LAD.  There was also an occluded OM1 with a patent saphenous vein graft to the OM.    e. Angina, cardiac cath, 07/30/2013, with 100% RCA.  Occluded  SVG.  With 100% LAD.  Patent LINDSEY with distal LAD occlusion.  Widely patent SVG to ramus and OM-1, filling other natives of the collaterals with normal LVEF.  f. Cardiac catheterization, 5/4/2018: 90% proximal left circumflex supplying the large left posterior lateral and collaterals to the right coronary artery/right PDA. Successful treatment with 3.0 x 12 Xience EES. Widely patent LIMA however distal LAD occluded. Patent saphenous vein graft to the ramus branch. 30% disease distal. LVEF 50%.  2. DVT  a. Idiopathic Duplex venous lower extremity, 9/19/2016: ·Acute left lower extremity deep vein thrombosis noted in the posterial tibial. Acute left lower extremity superficial thrombophlebitis noted in the greater saphenous (above knee) and varicosity (below knee). Chronic left lower extremity superficial thrombophlebitis noted in the greater saphenous (below knee).  3. Hypertension.  4. Dyslipidemia.  5. AAA  a. CT, 10/16/17.  5.0 cm infrarenal AAA,  followed by Dr. Isabel  6. Type 2 diabetes.   7. Ongoing tobacco abuse.  8. History of alcohol abuse, none since 1977.  9. COPD with nocturnal oxygen use.  10. Arthritis.  11. Hypothyroidism.  12. History of childhood seizures.  13. Left wrist cyst removal x2.  14. Left carpal tunnel surgery.  15. Bell’s palsy in 2012.    History of Present Illness  Patient presents today for hospital follow-up post cardiac catheterization and subsequent stenting.  Since last being seen he notes to be doing better.  He has had a couple of episodes of chest pain but denies any recurrent anginal symptoms.  Notes that his breathing is improved.  No reported syncope, near-syncope, or edema.  Denies any claudication.  His LDL was noted to be elevated at 110 at time of cardiac catheterization.  Statin therapy was not adjusted.  He is still smoking with no interest in quitting.    No Known Allergies      Current Outpatient Prescriptions:   •  aspirin 81 MG tablet, Take 81 mg by mouth daily., Disp: , Rfl:   •  atenolol (TENORMIN) 50 MG tablet, Take 50 mg by mouth daily., Disp: , Rfl:   •  clopidogrel (PLAVIX) 75 MG tablet, Take 1 tablet by mouth Daily., Disp: 30 tablet, Rfl: 11  •  fenofibrate 160 MG tablet, Take 160 mg by mouth daily., Disp: , Rfl:   •  glipiZIDE (GLUCOTROL) 10 MG tablet, Take 10 mg by mouth daily., Disp: , Rfl:   •  isosorbide mononitrate (IMDUR) 60 MG 24 hr tablet, Take 60 mg by mouth daily., Disp: , Rfl:   •  levothyroxine (SYNTHROID, LEVOTHROID) 112 MCG tablet, Take 112 mcg by mouth daily., Disp: , Rfl:   •  losartan-hydrochlorothiazide (HYZAAR) 100-25 MG per tablet, Take 1 tablet by mouth daily., Disp: , Rfl:   •  nitroglycerin (NITROSTAT) 0.4 MG SL tablet, 1 under the tongue as needed for angina, may repeat q5mins for up three doses, Disp: 25 tablet, Rfl: 3  •  O2 (OXYGEN), Inhale 2 L/min Every Night., Disp: , Rfl:   •  PIOGLITAZONE HCL PO, Take 15 mg by mouth daily., Disp: , Rfl:   •  rosuvastatin (CRESTOR) 20 MG  "tablet, Take 1 tablet by mouth daily., Disp: 90 tablet, Rfl: 3  •  thyroid (ARMOUR) 15 MG tablet, Take 15 mg by mouth daily., Disp: , Rfl:   •  warfarin (COUMADIN) 3 MG tablet, Take 3 mg by mouth Daily., Disp: , Rfl:   •  warfarin (COUMADIN) 4 MG tablet, Alternating with 3 mg, Disp: , Rfl:     The following portions of the patient's history were reviewed and updated as appropriate: allergies, current medications, past family history, past medical history, past social history, past surgical history and problem list.    Review of Systems   Constitution: Positive for weight gain.   Cardiovascular: Positive for chest pain.   Respiratory: Positive for cough and snoring.    Endocrine: Positive for polyuria.   Hematologic/Lymphatic: Negative for bleeding problem. Bruises/bleeds easily.   Skin: Positive for dry skin. Negative for rash.   Musculoskeletal: Positive for arthritis. Negative for muscle weakness and myalgias.   Gastrointestinal: Negative for heartburn, nausea and vomiting.   Neurological: Positive for excessive daytime sleepiness.          Objective:     /64 (BP Location: Right arm, Patient Position: Sitting)   Pulse 56   Ht 167.6 cm (66\")   Wt 90.7 kg (200 lb)   BMI 32.28 kg/m²        Physical Exam   Constitutional: He is oriented to person, place, and time. He appears well-developed and well-nourished. No distress.   Neck: No JVD present. No tracheal deviation present.   No bruit auscultated bilaterally   Cardiovascular: Normal rate, regular rhythm and normal heart sounds.  Exam reveals no friction rub.    No murmur heard.  Pulmonary/Chest: Effort normal and breath sounds normal. No respiratory distress.   Abdominal: Soft. Bowel sounds are normal. There is no tenderness.   Musculoskeletal: He exhibits no edema or deformity.   Neurological: He is alert and oriented to person, place, and time.   Skin: Skin is warm and dry.       Lab Review:    Lab Results   Component Value Date    CHOL 163 05/04/2018    " TRIG 151 (H) 05/04/2018    HDL 37 (L) 05/04/2018     05/04/2018     Lab Results   Component Value Date    HGBA1C 7.30 (H) 05/04/2018     Lab Results   Component Value Date    GLUCOSE 132 (H) 05/04/2018    BUN 20 05/04/2018    CREATININE 1.10 05/04/2018    EGFRIFNONA 66 05/04/2018    BCR 18.2 05/04/2018    K 3.8 05/04/2018    CO2 30.0 05/04/2018    CALCIUM 9.1 05/04/2018    ALBUMIN 4.20 05/04/2018    LABIL2 1.7 05/04/2018    AST 17 05/04/2018    ALT 16 05/04/2018     Lab Results   Component Value Date    WBC 6.72 05/04/2018    HGB 15.0 05/04/2018    HCT 46.2 05/04/2018    MCV 88.3 05/04/2018     05/04/2018     Procedures        Assessment:   Cal was seen today for coronary artery disease.    Diagnoses and all orders for this visit:    Coronary artery disease of bypass graft of native heart with stable angina pectoris    Essential hypertension, controlled.    Dyslipidemia, on statin therapy.  Recent LDL mildly elevated.    Tobacco abuse, ongoing.  No interest in quitting.    Plan:  1. Increase Crestor to 40 mg daily at bedtime for uncontrolled dyslipidemia and coronary disease.  2. Maintained Plavix therapy until November of this year and then he will discontinue this.  3. Check fasting lipid panel and CMP in 2-3 months.  4. Encouraged smoking cessation.  5. At next visit may consider discontinuing fenofibrate based on lipid results.  6. Revisit in 6 MO, or sooner as needed.    PEGGY Jj     Dictated with Monique.

## 2018-12-17 ENCOUNTER — OFFICE VISIT (OUTPATIENT)
Dept: CARDIOLOGY | Facility: CLINIC | Age: 73
End: 2018-12-17

## 2018-12-17 VITALS
SYSTOLIC BLOOD PRESSURE: 122 MMHG | BODY MASS INDEX: 31.57 KG/M2 | WEIGHT: 196.4 LBS | DIASTOLIC BLOOD PRESSURE: 66 MMHG | HEIGHT: 66 IN | HEART RATE: 50 BPM | OXYGEN SATURATION: 96 %

## 2018-12-17 DIAGNOSIS — I25.810 CORONARY ARTERY DISEASE INVOLVING CORONARY BYPASS GRAFT OF NATIVE HEART WITHOUT ANGINA PECTORIS: Primary | ICD-10-CM

## 2018-12-17 PROCEDURE — 99213 OFFICE O/P EST LOW 20 MIN: CPT | Performed by: INTERNAL MEDICINE

## 2018-12-17 NOTE — PROGRESS NOTES
Subjective:     Encounter Date:12/17/2018      Patient ID: Cal Frausto is a 73 y.o. male.    Chief Complaint: Coronary Artery Disease      PROBLEM LIST:  1.  Coronary artery disease:  a. April 1989, non-ST elevated  myocardial infarction with cardiac catheterization revealing an occluded LAD.  RCA within normal limits and within normal limits circumflex.   b. March 2004, abnormal Cardiolite with subsequent cardiac catheterization revealing multivessel disease.  c.  On 04/19/2004, coronary artery bypass grafting x3 with Dr. Cardona with a LIMA to the LAD, a saphenous vein graft to the ramus and OM1, and a saphenous vein graft to the PDA.   d. November 2004, cardiac catheterization which revealed an occluded saphenous  vein graft to the RCA.  There was an 80% left main with an occluded LAD with a patent LIMA graft to the LAD.  There was also an occluded OM1 with a patent saphenous vein graft to the OM.    e. Angina, cardiac cath, 07/30/2013, with 100% RCA.  Occluded  SVG.  With 100% LAD.  Patent LINDSEY with distal LAD occlusion.  Widely patent SVG to ramus and OM-1, filling other natives of the collaterals with normal LVEF.  f. Cardiac catheterization, 5/4/2018: 90% proximal left circumflex supplying the large left posterior lateral and collaterals to the right coronary artery/right PDA. Successful treatment with 3.0 x 12 Xience EES. Widely patent LIMA however distal LAD occluded. Patent saphenous vein graft to the ramus branch. 30% disease distal. LVEF 50%.  2. DVT  a. Idiopathic Duplex venous lower extremity, 9/19/2016: ·Acute left lower extremity deep vein thrombosis noted in the posterial tibial. Acute left lower extremity superficial thrombophlebitis noted in the greater saphenous (above knee) and varicosity (below knee). Chronic left lower extremity superficial thrombophlebitis noted in the greater saphenous (below knee).  3. Hypertension.  4. Dyslipidemia.  5. AAA  a. CT, 10/16/17.  5.0 cm infrarenal AAA,  followed by Dr. Isabel  6. Type 2 diabetes.   7. Ongoing tobacco abuse.  8. History of alcohol abuse, none since 1977.  9. COPD with nocturnal oxygen use.  10. Arthritis.  11. Hypothyroidism.  12. History of childhood seizures.  13. Left wrist cyst removal x2.  14. Left carpal tunnel surgery.  15. Bell’s palsy in 2012.    History of Present Illness  Cal Frausto returns today for a 6 month follow up with a history of coronary artery disease and DVT among other cardiac risk factors. Since last visit he has been doing well from a cardiovascular standpoint. He states that he feels 99% better since his stenting in May. He is experiencing bruising due to the Plavix. Denies any exertional chest pain, shortness of breath, orthopnea, PND, or palpitations.    No Known Allergies      Current Outpatient Medications:   •  aspirin 81 MG tablet, Take 81 mg by mouth daily., Disp: , Rfl:   •  atenolol (TENORMIN) 50 MG tablet, Take 50 mg by mouth daily., Disp: , Rfl:   •  fenofibrate 160 MG tablet, Take 160 mg by mouth daily., Disp: , Rfl:   •  glipiZIDE (GLUCOTROL) 10 MG tablet, Take 10 mg by mouth daily., Disp: , Rfl:   •  isosorbide mononitrate (IMDUR) 60 MG 24 hr tablet, Take 60 mg by mouth daily., Disp: , Rfl:   •  levothyroxine (SYNTHROID, LEVOTHROID) 112 MCG tablet, Take 112 mcg by mouth daily., Disp: , Rfl:   •  losartan-hydrochlorothiazide (HYZAAR) 100-25 MG per tablet, Take 1 tablet by mouth daily., Disp: , Rfl:   •  nitroglycerin (NITROSTAT) 0.4 MG SL tablet, 1 under the tongue as needed for angina, may repeat q5mins for up three doses, Disp: 25 tablet, Rfl: 3  •  O2 (OXYGEN), Inhale 2 L/min Every Night., Disp: , Rfl:   •  PIOGLITAZONE HCL PO, Take 15 mg by mouth daily., Disp: , Rfl:   •  rosuvastatin (CRESTOR) 40 MG tablet, Take 1 tablet by mouth Every Night., Disp: 90 tablet, Rfl: 3  •  thyroid (ARMOUR) 15 MG tablet, Take 15 mg by mouth daily., Disp: , Rfl:   •  warfarin (COUMADIN) 3 MG tablet, Take 3 mg by mouth  "Daily., Disp: , Rfl:   •  warfarin (COUMADIN) 4 MG tablet, Alternating with 3 mg, Disp: , Rfl:     The following portions of the patient's history were reviewed and updated as appropriate: allergies, current medications, past family history, past medical history, past social history, past surgical history and problem list.    Review of Systems   Constitution: Positive for weight loss. Negative for weakness and malaise/fatigue.   Cardiovascular: Negative.  Negative for chest pain, leg swelling, near-syncope, orthopnea, palpitations, paroxysmal nocturnal dyspnea and syncope.   Respiratory: Negative.  Negative for cough, shortness of breath and wheezing.    Hematologic/Lymphatic: Negative for bleeding problem. Does not bruise/bleed easily.   Skin: Negative for rash.   Musculoskeletal: Negative for muscle weakness and myalgias.   Gastrointestinal: Negative for heartburn, nausea and vomiting.   Neurological: Negative.  Negative for dizziness, headaches, loss of balance, tremors and vertigo.   Psychiatric/Behavioral: The patient is not nervous/anxious.           Objective:     Vitals:    12/17/18 1324   BP: 122/66   BP Location: Left arm   Patient Position: Sitting   Pulse: 50   SpO2: 96%   Weight: 89.1 kg (196 lb 6.4 oz)   Height: 167.6 cm (66\")         Physical Exam   Constitutional: He is oriented to person, place, and time. He appears well-developed and well-nourished.   HENT:   Mouth/Throat: Oropharynx is clear and moist.   Neck: No JVD present. Carotid bruit is not present. No thyromegaly present.   Cardiovascular: Regular rhythm, S1 normal, S2 normal, normal heart sounds and intact distal pulses. Exam reveals no gallop, no S3 and no S4.   No murmur heard.  Pulses:       Carotid pulses are 2+ on the right side, and 2+ on the left side.       Radial pulses are 2+ on the right side, and 2+ on the left side.   Pulmonary/Chest: Breath sounds normal.   Abdominal: Soft. Bowel sounds are normal. He exhibits no mass. There " is no tenderness.   Musculoskeletal: He exhibits no edema.   Neurological: He is alert and oriented to person, place, and time.   Skin: Skin is warm and dry. No rash noted.     Lab Review:    Scanned Labs, 11/16/2018.    Procedures        Assessment:   Cal was seen today for coronary artery disease.    Diagnoses and all orders for this visit:    Coronary artery disease involving coronary bypass graft of native heart without angina pectoris        Impression:  1. Coronary artery disease, stable without angina.  2. Hypertension is well-controlled.  3. Dyslipidemia is controlled with statin therapy.  4. AAA followed by Dr. Isabel  5. COPD and dyspnea on exertion  6. Warfarin due to chronic DVTs    Plan:  1. Discontinue Plavix.  Continue aspirin and warfarin Continue current medications.  2. Revisit in 12 MO, or sooner as needed.    Scribed for Keegan Aguilera MD by Cole Han. 12/17/2018  2:19 PM    I, Keegan Aguilera MD, personally performed the services described in this documentation as scribed by the above individual in my presence, and it is both accurate and complete      Please note that portions of this note may have been completed with a voice recognition program. Efforts were made to edit the dictations, but occasionally words are mistranscribed.

## 2019-03-19 ENCOUNTER — OFFICE VISIT (OUTPATIENT)
Dept: GASTROENTEROLOGY | Facility: CLINIC | Age: 74
End: 2019-03-19

## 2019-03-19 VITALS
TEMPERATURE: 98.2 F | SYSTOLIC BLOOD PRESSURE: 122 MMHG | WEIGHT: 206 LBS | HEIGHT: 66 IN | RESPIRATION RATE: 16 BRPM | DIASTOLIC BLOOD PRESSURE: 78 MMHG | OXYGEN SATURATION: 98 % | HEART RATE: 65 BPM | BODY MASS INDEX: 33.11 KG/M2

## 2019-03-19 DIAGNOSIS — Z79.899 HIGH RISK MEDICATION USE: Primary | ICD-10-CM

## 2019-03-19 PROCEDURE — 99202 OFFICE O/P NEW SF 15 MIN: CPT | Performed by: INTERNAL MEDICINE

## 2019-03-19 NOTE — PROGRESS NOTES
PCP: Harsha Smith MD    Chief Complaint   Patient presents with   • NEW PATIENT     DR. NAPOLES DID LAST COLONOSCOPY; PT THINKS HE IS DUE FOR ONE.   • Advice Only     COLON        History of Present Illness:   Cal Frausto is a 73 y.o. male who presents to the GI clinic to discuss risk of colonoscopy on blood thinner. Takes coumadin/asa for cad, pad including large abdominal aortic aneurysm.  No gib loss. No abdominal pain.    Past Medical History:   Diagnosis Date   • Alcohol abuse     none since 1977   • Arthritis    • COPD (chronic obstructive pulmonary disease) (CMS/HCC)     with nocturnal oxygen use   • Coronary artery disease    • Deep vein thrombosis (CMS/HCC)     Left leg    • Dyslipidemia    • Dyslipidemia    • H/O Bell's palsy 2012   • History of alcohol abuse     History of alcohol abuse, none since 1977.   • History of seizures as a child    • Hypertension    • Hypothyroidism    • Pneumonia    • Seizures (CMS/HCC)     History of childhood seizures.   • Tobacco abuse     Ongoing   • Type 2 diabetes mellitus (CMS/HCC)        Past Surgical History:   Procedure Laterality Date   • CARDIAC CATHETERIZATION  11/2004    revealed an occluded saphenous vein graft to the RCA. There was an 80% left main with an occluded LAD with a patent LIMA graft to the LAD. There was also an occluded OM1 with a patent saphenous vein graft to the OM.   • CARDIAC CATHETERIZATION  07/30/2013    with 100% RCA. Occluded SVG. With 100% LAD. Patent LINDSEY with distal LAD occlusion. Widely patent SVG to ramus and OM-1, filling other natives of the collaterals with normal LVEF.   • CARDIAC CATHETERIZATION N/A 5/4/2018    Procedure: Left Heart Cath;  Surgeon: Keegan Aguilera MD;  Location: [facility] CATH INVASIVE LOCATION;  Service: Cardiovascular   • CARPAL TUNNEL RELEASE     • CORONARY ARTERY BYPASS GRAFT      x3   • CYST REMOVAL Left     wrist x2         Current Outpatient Medications:   •  aspirin 81 MG tablet, Take 81 mg by mouth  daily., Disp: , Rfl:   •  atenolol (TENORMIN) 50 MG tablet, Take 50 mg by mouth daily., Disp: , Rfl:   •  fenofibrate 160 MG tablet, Take 160 mg by mouth daily., Disp: , Rfl:   •  glipiZIDE (GLUCOTROL) 10 MG tablet, Take 10 mg by mouth daily., Disp: , Rfl:   •  isosorbide mononitrate (IMDUR) 60 MG 24 hr tablet, Take 60 mg by mouth daily., Disp: , Rfl:   •  levothyroxine (SYNTHROID, LEVOTHROID) 112 MCG tablet, Take 112 mcg by mouth daily., Disp: , Rfl:   •  losartan-hydrochlorothiazide (HYZAAR) 100-25 MG per tablet, Take 1 tablet by mouth daily., Disp: , Rfl:   •  nitroglycerin (NITROSTAT) 0.4 MG SL tablet, 1 under the tongue as needed for angina, may repeat q5mins for up three doses, Disp: 25 tablet, Rfl: 3  •  O2 (OXYGEN), Inhale 2 L/min Every Night., Disp: , Rfl:   •  PIOGLITAZONE HCL PO, Take 15 mg by mouth daily., Disp: , Rfl:   •  rosuvastatin (CRESTOR) 40 MG tablet, Take 1 tablet by mouth Every Night., Disp: 90 tablet, Rfl: 3  •  thyroid (ARMOUR) 15 MG tablet, Take 15 mg by mouth daily., Disp: , Rfl:   •  warfarin (COUMADIN) 3 MG tablet, Take 3 mg by mouth Daily., Disp: , Rfl:   •  warfarin (COUMADIN) 4 MG tablet, Alternating with 3 mg, Disp: , Rfl:     No Known Allergies    Family History   Problem Relation Age of Onset   • No Known Problems Sister        Social History     Socioeconomic History   • Marital status:      Spouse name: Not on file   • Number of children: Not on file   • Years of education: Not on file   • Highest education level: Not on file   Social Needs   • Financial resource strain: Not on file   • Food insecurity - worry: Not on file   • Food insecurity - inability: Not on file   • Transportation needs - medical: Not on file   • Transportation needs - non-medical: Not on file   Occupational History   • Not on file   Tobacco Use   • Smoking status: Current Every Day Smoker     Packs/day: 1.00     Types: Cigarettes   • Smokeless tobacco: Never Used   Substance and Sexual Activity   •  Alcohol use: No   • Drug use: No   • Sexual activity: Defer   Other Topics Concern   • Not on file   Social History Narrative   • Not on file       Review of Systems   Constitutional: Negative.    HENT: Negative.    Eyes: Negative.    Respiratory: Negative.    Cardiovascular: Negative.    Gastrointestinal: Negative.    Endocrine: Negative.    Genitourinary: Negative.    Musculoskeletal: Negative.    Skin: Negative.    Allergic/Immunologic: Negative.    Neurological: Negative.    Hematological: Negative.    Psychiatric/Behavioral: Negative.      All other systems reviewed and are negative.    There were no vitals filed for this visit.    Physical Exam  General Appearance:  Vitals as above. no acute distress  Head/face:  Normocephalic, atraumatic  Eyes:   EOMI, no conjunctivitis or icterus   Nose/Sinuses:  Nares patent bilaterally without discharge or lesions  Mouth/Throat:  Posterior pharynx is pink without drainage or exudates;  dentition is in good condition and repair  Neck:  trachea is midline, no thyromegaly  Neurologic:  Alert; no focal deficits; age appropriate behavior and speech  Psychiatric: mood and affect are congruent  Skin: no rash or cyanosis.  Abdomen: obese and soft/nt      Assessment/Plan  1.) HCM  2.) Anticoagulant/antiplatelet use    I offered screening colonoscopy on blood thinner agents with his understanding I would not take broadbased polyps/masses out > 9 mm.  His last colon was 2013 and he had multiple polyps which could imply a fair chance of having a polyp of this size 6 years later.  Due to this, I defer to prescribing physician and the patient as to whether to offer lovenox bridge of hold coumadin.  Asa can continue regardless.    rtc prn    Puma Newell MD  3/19/2019

## 2020-01-10 ENCOUNTER — TRANSCRIBE ORDERS (OUTPATIENT)
Dept: ADMINISTRATIVE | Facility: HOSPITAL | Age: 75
End: 2020-01-10

## 2020-01-13 ENCOUNTER — OFFICE VISIT (OUTPATIENT)
Dept: CARDIOLOGY | Facility: CLINIC | Age: 75
End: 2020-01-13

## 2020-01-13 ENCOUNTER — TRANSCRIBE ORDERS (OUTPATIENT)
Dept: ADMINISTRATIVE | Facility: HOSPITAL | Age: 75
End: 2020-01-13

## 2020-01-13 VITALS
OXYGEN SATURATION: 93 % | DIASTOLIC BLOOD PRESSURE: 60 MMHG | HEART RATE: 65 BPM | HEIGHT: 65 IN | SYSTOLIC BLOOD PRESSURE: 114 MMHG | WEIGHT: 210 LBS | BODY MASS INDEX: 34.99 KG/M2

## 2020-01-13 DIAGNOSIS — I10 ESSENTIAL HYPERTENSION: ICD-10-CM

## 2020-01-13 DIAGNOSIS — I71.40 ABDOMINAL AORTIC ANEURYSM (AAA) WITHOUT RUPTURE (HCC): ICD-10-CM

## 2020-01-13 DIAGNOSIS — I25.810 CORONARY ARTERY DISEASE INVOLVING CORONARY BYPASS GRAFT OF NATIVE HEART WITHOUT ANGINA PECTORIS: Primary | ICD-10-CM

## 2020-01-13 DIAGNOSIS — R79.89 ELEVATED SERUM CREATININE: Primary | ICD-10-CM

## 2020-01-13 DIAGNOSIS — E78.5 DYSLIPIDEMIA: ICD-10-CM

## 2020-01-13 DIAGNOSIS — Z86.718 HISTORY OF DVT (DEEP VEIN THROMBOSIS): ICD-10-CM

## 2020-01-13 PROCEDURE — 99214 OFFICE O/P EST MOD 30 MIN: CPT | Performed by: INTERNAL MEDICINE

## 2020-01-13 NOTE — PROGRESS NOTES
[facility]  Subjective:     Encounter Date:01/13/2020      Patient ID: Cal Frausto is a 74 y.o. male.    Chief Complaint: Coronary Artery Disease      PROBLEM LIST:  1.  Coronary artery disease:  a. [facility] for NSTEMI, April 1989: Occluded LAD. RCA and LCx within normal limits.   b. [facility], 03/2004, for abnormal Carmultivessel disease.  c. CABG x3, 04/19/2004, Dr. Cardona: LIMA to LAD, SVG to the ramus and OM1, SVG to PDA.   d. [facility], 11/2004: Occluded SVG to the RCA. 80% LM with an occluded LAD with a patent LIMA graft to the LAD. Occluded OM1 with a patent SVG to the OM.    e. [facility], 07/30/2013: 100% RCA. Occluded SVG. Patent LIMA with distal LAD occlusion. Widely patent SVG to ramus and OM-1, filling other natives of the collaterals with normal LVEF.  f. [facility], 05/04/2018: 90% proximal LCx supplying the large left posterior lateral and collaterals to the RCA/right PDA. Successful treatment with 3.0 x 12 Xience EES. Widely patent LIMA however distal LAD occluded. Patent SVG to the ramus branch. 30% disease distal. EF 50%.  2. DVT:  a. 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 Acute LLE superficial thrombophlebitis noted in the greater saphenous (above knee) and varicosity (below knee). Chronic LLE superficial thrombophlebitis noted in the greater saphenous (below knee).  3. Hypertension.  4. Dyslipidemia.  5. AAA  a. CTA Abdomen, 10/16/2017: 5.0 cm infrarenal AAA, followed by Dr. Isabel  6. Type 2 diabetes.   7. Ongoing tobacco abuse.  8. Renal cancer 2019  a. Status post left nephrectomy  9. History of alcohol abuse, none since 1977.  10. COPD with nocturnal oxygen use.  11. Arthritis.  12. Hypothyroidism.  13. History of childhood seizures.  14. Left wrist cyst removal x2.  15. Left carpal tunnel surgery.  16. Bell’s palsy in 2012.    History of Present Illness  Cal Frausto returns today for annual follow up with a  history of coronary artery disease, hypertension, hyperlipidemia, DVT, and AAA. Since last visit, he underwent nephrectomy for renal cancer. He has not had chemotherapy or radiation. He has otherwise been feeling well overall from a cardiovascular standpoint. Denies any exertional chest pain, shortness of breath, orthopnea, PND, or palpitations.    No Known Allergies      Current Outpatient Medications:   •  aspirin 81 MG tablet, Take 81 mg by mouth daily., Disp: , Rfl:   •  atenolol (TENORMIN) 50 MG tablet, Take 50 mg by mouth daily., Disp: , Rfl:   •  fenofibrate 160 MG tablet, Take 160 mg by mouth daily., Disp: , Rfl:   •  glipiZIDE (GLUCOTROL) 10 MG tablet, Take 10 mg by mouth daily., Disp: , Rfl:   •  isosorbide mononitrate (IMDUR) 60 MG 24 hr tablet, Take 60 mg by mouth daily., Disp: , Rfl:   •  levothyroxine (SYNTHROID, LEVOTHROID) 112 MCG tablet, Take 112 mcg by mouth daily., Disp: , Rfl:   •  losartan-hydrochlorothiazide (HYZAAR) 100-25 MG per tablet, Take 1 tablet by mouth daily., Disp: , Rfl:   •  nitroglycerin (NITROSTAT) 0.4 MG SL tablet, 1 under the tongue as needed for angina, may repeat q5mins for up three doses, Disp: 25 tablet, Rfl: 3  •  O2 (OXYGEN), Inhale 2 L/min Every Night., Disp: , Rfl:   •  PIOGLITAZONE HCL PO, Take 15 mg by mouth daily., Disp: , Rfl:   •  rosuvastatin (CRESTOR) 40 MG tablet, Take 1 tablet by mouth Every Night., Disp: 90 tablet, Rfl: 3  •  thyroid (ARMOUR) 15 MG tablet, Take 15 mg by mouth daily., Disp: , Rfl:   •  warfarin (COUMADIN) 3 MG tablet, Take 5 mg by mouth Daily., Disp: , Rfl:   •  warfarin (COUMADIN) 4 MG tablet, Alternating with 3 mg, Disp: , Rfl:     The following portions of the patient's history were reviewed and updated as appropriate: allergies, current medications, past family history, past medical history, past social history, past surgical history and problem list.    Review of Systems   Constitution: Negative.   Cardiovascular: Negative for chest pain,  "dyspnea on exertion, irregular heartbeat and syncope.   Respiratory: Negative.    Hematologic/Lymphatic: Bruises/bleeds easily.   Skin: Positive for dry skin and itching. Negative for rash.   Musculoskeletal: Positive for arthritis and muscle cramps. Negative for muscle weakness and myalgias.   Gastrointestinal: Negative for heartburn, nausea and vomiting.   Neurological: Negative.           Objective:     Vitals:    01/13/20 1435   BP: 114/60   BP Location: Right arm   Patient Position: Sitting   Pulse: 65   SpO2: 93%   Weight: 95.3 kg (210 lb)   Height: 165.1 cm (65\")         Physical Exam   Constitutional: He is oriented to person, place, and time. He appears well-developed and well-nourished.   HENT:   Mouth/Throat: Oropharynx is clear and moist.   Neck: No JVD present. Carotid bruit is not present. No thyromegaly present.   Cardiovascular: Regular rhythm, S1 normal, S2 normal, normal heart sounds and intact distal pulses. Exam reveals no gallop, no S3 and no S4.   No murmur heard.  Pulses:       Carotid pulses are 2+ on the right side, and 2+ on the left side.       Radial pulses are 2+ on the right side, and 2+ on the left side.   Pulmonary/Chest: Breath sounds normal.   Abdominal: Soft. Bowel sounds are normal. He exhibits no mass. There is no tenderness.   Musculoskeletal: He exhibits no edema.   Neurological: He is alert and oriented to person, place, and time.   Skin: Skin is warm and dry. No rash noted.       Lab Review:    Last lipid panel, 08/08/2019:       HDL 44  LDL 83        Procedures        Assessment:   Cal was seen today for coronary artery disease.    Diagnoses and all orders for this visit:    Coronary artery disease involving coronary bypass graft of native heart without angina pectoris    Abdominal aortic aneurysm (AAA) without rupture (CMS/HCC)    Essential hypertension    Dyslipidemia    History of DVT (deep vein thrombosis)        Impression:  1. Coronary artery disease " s/p CABG and stents, stable without angina. On aspirin 81 mg for antiplatelet therapy, and Imdur/nitroglycerin for chest pain.  2. Hypertension, well-controlled on atenolol and losartan-HCT.  3. Hyperlipidemia, acceptable control on rosuvastatin 40 mg and fenofibrate.   4. History of DVT, on warfarin for DVT/PE prophylaxis.  5. AAA; Pt reports having had a CT Angio in October, prior to his nephrectomy.    Plan:  1. Obtain most recent CTA abdomen for AAA from Dr. Cueva's office.  If still 5 cm or less, repeat CTA in 6-12 months.  2. AAA greater than 5 cm will refer to vascular surgery  3. Continue current medications.  4. Revisit in 9 MO, or sooner as needed.    Scribed for Keegan Aguilera MD by Ariane Peterson. 1/13/2020  3:19 PM    Keegan Aguilera MD      Please note that portions of this note may have been completed with a voice recognition program. Efforts were made to edit the dictations, but occasionally words are mistranscribed.

## 2020-01-28 ENCOUNTER — HOSPITAL ENCOUNTER (OUTPATIENT)
Dept: ULTRASOUND IMAGING | Facility: HOSPITAL | Age: 75
Discharge: HOME | End: 2020-01-28
Admitting: FAMILY MEDICINE

## 2020-01-28 DIAGNOSIS — R79.89 ELEVATED SERUM CREATININE: ICD-10-CM

## 2020-01-28 PROCEDURE — 76775 US EXAM ABDO BACK WALL LIM: CPT

## 2020-07-14 ENCOUNTER — TRANSCRIBE ORDERS (OUTPATIENT)
Dept: ADMINISTRATIVE | Facility: HOSPITAL | Age: 75
End: 2020-07-14

## 2020-07-14 DIAGNOSIS — K43.2 INCISIONAL HERNIA, WITHOUT OBSTRUCTION OR GANGRENE: Primary | ICD-10-CM

## 2020-08-06 ENCOUNTER — HOSPITAL ENCOUNTER (OUTPATIENT)
Dept: CT IMAGING | Facility: HOSPITAL | Age: 75
Discharge: HOME | End: 2020-08-06
Admitting: SURGERY

## 2020-08-06 DIAGNOSIS — K43.2 INCISIONAL HERNIA, WITHOUT OBSTRUCTION OR GANGRENE: ICD-10-CM

## 2020-08-06 PROCEDURE — 74176 CT ABD & PELVIS W/O CONTRAST: CPT

## 2020-08-25 ENCOUNTER — OFFICE VISIT (OUTPATIENT)
Dept: CARDIAC SURGERY | Facility: CLINIC | Age: 75
End: 2020-08-25

## 2020-08-25 VITALS
DIASTOLIC BLOOD PRESSURE: 85 MMHG | OXYGEN SATURATION: 95 % | SYSTOLIC BLOOD PRESSURE: 134 MMHG | WEIGHT: 198.6 LBS | HEIGHT: 66 IN | BODY MASS INDEX: 31.92 KG/M2 | HEART RATE: 52 BPM | TEMPERATURE: 98.6 F

## 2020-08-25 DIAGNOSIS — I71.40 ABDOMINAL AORTIC ANEURYSM (AAA) WITHOUT RUPTURE (HCC): Primary | ICD-10-CM

## 2020-08-25 PROCEDURE — 99204 OFFICE O/P NEW MOD 45 MIN: CPT | Performed by: THORACIC SURGERY (CARDIOTHORACIC VASCULAR SURGERY)

## 2020-08-25 NOTE — PROGRESS NOTES
08/25/2020  Patient Information  Cal Frausto                                                                                          [id number]  [facility] [address]   1945  'PCP/Referring Physician'  Harsha Smith MD  [phone]  Maximino Dey MD  [phone]  Chief Complaint   Patient presents with   • Aortic Aneurysm     Referred by Dr. Maximino Dey for 6.4cm abdominal aortic aneurysm        History of Present Illness: 74-year-old  male with a history of hypertension, hyperlipidemia, diabetes mellitus, coronary artery disease status post CABG, renal cancer, COPD and active tobacco abuse who presents with abdominal aortic aneurysm.  The patient denies abdominal or back pain.  Routine screening CT scan demonstrated enlargement of his known abdominal aortic aneurysm up to 6.4 cm.      Patient Active Problem List   Diagnosis   • AAA (abdominal aortic aneurysm) (CMS/HCC)   • Coronary artery disease involving coronary bypass graft of native heart   • Essential hypertension   • Tobacco abuse   • Dyslipidemia   • Type 2 diabetes mellitus (CMS/HCC)   • COPD (chronic obstructive pulmonary disease) (CMS/HCC)   • Arthritis   • Hypothyroidism   • DVT (deep venous thrombosis) (CMS/HCC)   • Coronary artery disease involving coronary bypass graft of native heart without angina pectoris     Past Medical History:   Diagnosis Date   • Alcohol abuse     none since 1977   • Arthritis    • COPD (chronic obstructive pulmonary disease) (CMS/HCC)     with nocturnal oxygen use   • Coronary artery disease    • Deep vein thrombosis (CMS/HCC)     Left leg    • Dyslipidemia    • Dyslipidemia    • H/O Bell's palsy 2012   • History of alcohol abuse     History of alcohol abuse, none since 1977.   • History of seizures as a child    • Hypertension    • Hypothyroidism    • Pneumonia    • Seizures (CMS/HCC)     History of childhood seizures.   • Tobacco abuse     Ongoing   • Type 2 diabetes mellitus  (CMS/[facility])      Past Surgical History:   Procedure Laterality Date   • CARDIAC CATHETERIZATION  11/2004    revealed an occluded saphenous vein graft to the RCA. There was an 80% left main with an occluded LAD with a patent LIMA graft to the LAD. There was also an occluded OM1 with a patent saphenous vein graft to the OM.   • CARDIAC CATHETERIZATION  07/30/2013    with 100% RCA. Occluded SVG. With 100% LAD. Patent LINDSEY with distal LAD occlusion. Widely patent SVG to ramus and OM-1, filling other natives of the collaterals with normal LVEF.   • CARDIAC CATHETERIZATION N/A 5/4/2018    Procedure: Left Heart Cath;  Surgeon: Keegan Aguilera MD;  Location: [facility] CATH INVASIVE LOCATION;  Service: Cardiovascular   • CARPAL TUNNEL RELEASE     • CORONARY ARTERY BYPASS GRAFT  2004    x3   • CYST REMOVAL Left     wrist x2   • KIDNEY SURGERY  2018       Current Outpatient Medications:   •  aspirin 81 MG tablet, Take 81 mg by mouth daily., Disp: , Rfl:   •  atenolol (TENORMIN) 50 MG tablet, Take 50 mg by mouth daily., Disp: , Rfl:   •  fenofibrate 160 MG tablet, Take 160 mg by mouth daily., Disp: , Rfl:   •  isosorbide mononitrate (IMDUR) 60 MG 24 hr tablet, Take 60 mg by mouth daily., Disp: , Rfl:   •  levothyroxine (SYNTHROID, LEVOTHROID) 112 MCG tablet, Take 112 mcg by mouth daily., Disp: , Rfl:   •  losartan-hydrochlorothiazide (HYZAAR) 100-25 MG per tablet, Take 1 tablet by mouth daily., Disp: , Rfl:   •  nitroglycerin (NITROSTAT) 0.4 MG SL tablet, 1 under the tongue as needed for angina, may repeat q5mins for up three doses, Disp: 25 tablet, Rfl: 3  •  O2 (OXYGEN), Inhale 2 L/min Every Night., Disp: , Rfl:   •  PIOGLITAZONE HCL PO, Take 15 mg by mouth daily., Disp: , Rfl:   •  rosuvastatin (CRESTOR) 40 MG tablet, Take 1 tablet by mouth Every Night., Disp: 90 tablet, Rfl: 3  •  SITagliptin (Januvia) 100 MG tablet, Take 100 mg by mouth Daily., Disp: , Rfl:   •  thyroid (ARMOUR) 15 MG tablet, Take 15 mg by mouth daily., Disp: ,  Rfl:   •  warfarin (COUMADIN) 3 MG tablet, Take 5 mg by mouth Daily., Disp: , Rfl:   •  warfarin (COUMADIN) 4 MG tablet, Alternating with 3 mg, Disp: , Rfl:   •  glipiZIDE (GLUCOTROL) 10 MG tablet, Take 10 mg by mouth daily., Disp: , Rfl:   No Known Allergies  Social History     Socioeconomic History   • Marital status:      Spouse name: Not on file   • Number of children: 3   • Years of education: Not on file   • Highest education level: Not on file   Occupational History   • Occupation: [employer]      Employer: RETIRED   Tobacco Use   • Smoking status: Current Every Day Smoker     Packs/day: 1.00     Years: 54.00     Pack years: 54.00     Types: Cigarettes   • Smokeless tobacco: Never Used   • Tobacco comment: smoked about 54 years.    Substance and Sexual Activity   • Alcohol use: No   • Drug use: No   • Sexual activity: Defer   Social History Narrative    Lives in [place], [place] alone     Family History   Problem Relation Age of Onset   • No Known Problems Sister      Review of Systems   Constitution: Negative for chills, fever, malaise/fatigue, night sweats and weight loss.   HENT: Negative for hearing loss, odynophagia and sore throat.    Cardiovascular: Positive for chest pain. Negative for dyspnea on exertion, leg swelling, orthopnea and palpitations.   Respiratory: Negative for cough and hemoptysis.    Endocrine: Negative for cold intolerance, heat intolerance, polydipsia, polyphagia and polyuria.   Hematologic/Lymphatic: Bruises/bleeds easily.   Skin: Negative for itching and rash.   Musculoskeletal: Positive for back pain and joint pain (left knee). Negative for joint swelling and myalgias.   Gastrointestinal: Negative for abdominal pain, constipation, diarrhea, hematemesis, hematochezia, melena, nausea and vomiting.   Genitourinary: Negative for dysuria, frequency and hematuria.   Neurological: Negative for focal weakness, headaches, numbness and seizures.   Psychiatric/Behavioral: Negative for  "suicidal ideas.   Allergic/Immunologic: Positive for environmental allergies.   All other systems reviewed and are negative.    Vitals:    08/25/20 1628   BP: 134/85   BP Location: Right arm   Patient Position: Sitting   Pulse: 52   Temp: 98.6 °F (37 °C)   SpO2: 95%   Weight: 90.1 kg (198 lb 9.6 oz)   Height: 167.6 cm (66\")      Physical Exam   Constitutional: He is oriented to person, place, and time. He appears well-developed and well-nourished. No distress.    male who appears stated age and is present with his nephew   HENT:   Head: Normocephalic and atraumatic.   Eyes: Conjunctivae are normal. No scleral icterus.   Neck: Normal range of motion. No JVD present. Carotid bruit is not present. No tracheal deviation present.   Cardiovascular: Normal rate, regular rhythm and normal heart sounds. Exam reveals no gallop and no friction rub.   No murmur heard.  Pulmonary/Chest: Effort normal and breath sounds normal. No stridor. No respiratory distress. He has no wheezes. He has no rales.   Abdominal: Soft. He exhibits no distension and no mass. There is no tenderness. There is no rebound and no guarding.   Musculoskeletal: Normal range of motion. He exhibits no edema.   Neurological: He is alert and oriented to person, place, and time.   Skin: Skin is warm and dry. No rash noted. He is not diaphoretic. No erythema.   Psychiatric: He has a normal mood and affect. His behavior is normal. Judgment and thought content normal.       The past medical history, surgical history, family history, social history, review of systems and vitals were reviewed by myself and corrected as needed.      Labs/Imaging:  -CT of the abdomen and pelvis without contrast performed 8/6/2020, personally reviewed, demonstrates a 6.4 cm infrarenal abdominal aortic aneurysm.  There is scattered aortoiliac calcifications.  A ventral hernia is present with bowel involvement.  No bowel dilation to suggest obstruction.    Assessment/Plan:  " 74-year-old  male with a history of hypertension, hyperlipidemia, diabetes mellitus, coronary artery disease status post CABG, renal cancer, COPD and active tobacco abuse who presents with an asymptomatic 6.4 cm infrarenal abdominal aortic aneurysm.  I discussed with the patient performing endovascular repair of his abdominal aortic aneurysm.  The risks and benefits of surgery were discussed with the patient including pain, bleeding, infection, renal dysfunction/failure, myocardial infarction and death.  The patient understood these risks and wished to proceed with surgery.    Patient Active Problem List   Diagnosis   • AAA (abdominal aortic aneurysm) (CMS/[facility])   • Coronary artery disease involving coronary bypass graft of native heart   • Essential hypertension   • Tobacco abuse   • Dyslipidemia   • Type 2 diabetes mellitus (CMS/[facility])   • COPD (chronic obstructive pulmonary disease) (CMS/[facility])   • Arthritis   • Hypothyroidism   • DVT (deep venous thrombosis) (CMS/[facility])   • Coronary artery disease involving coronary bypass graft of native heart without angina pectoris

## 2020-08-26 ENCOUNTER — TELEPHONE (OUTPATIENT)
Dept: CARDIOLOGY | Facility: CLINIC | Age: 75
End: 2020-08-26

## 2020-08-26 PROBLEM — I71.40 ABDOMINAL AORTIC ANEURYSM (AAA) WITHOUT RUPTURE: Status: ACTIVE | Noted: 2020-08-26

## 2020-09-14 ENCOUNTER — PREP FOR SURGERY (OUTPATIENT)
Dept: OTHER | Facility: HOSPITAL | Age: 75
End: 2020-09-14

## 2020-09-14 DIAGNOSIS — I71.40 ABDOMINAL AORTIC ANEURYSM (AAA) WITHOUT RUPTURE (HCC): Primary | ICD-10-CM

## 2020-09-14 RX ORDER — CHLORHEXIDINE GLUCONATE 500 MG/1
1 CLOTH TOPICAL EVERY 12 HOURS PRN
Status: CANCELLED | OUTPATIENT
Start: 2020-09-15

## 2020-09-15 ENCOUNTER — APPOINTMENT (OUTPATIENT)
Dept: PREADMISSION TESTING | Facility: HOSPITAL | Age: 75
End: 2020-09-15

## 2020-09-15 ENCOUNTER — HOSPITAL ENCOUNTER (OUTPATIENT)
Dept: GENERAL RADIOLOGY | Facility: HOSPITAL | Age: 75
Discharge: HOME | End: 2020-09-15

## 2020-09-15 VITALS — BODY MASS INDEX: 30.73 KG/M2 | OXYGEN SATURATION: 97 % | WEIGHT: 195.77 LBS | HEIGHT: 67 IN

## 2020-09-15 DIAGNOSIS — I71.40 ABDOMINAL AORTIC ANEURYSM (AAA) WITHOUT RUPTURE (HCC): ICD-10-CM

## 2020-09-15 LAB
ABO GROUP BLD: NORMAL
APTT PPP: 33.6 SECONDS (ref 24–37)
BASOPHILS # BLD AUTO: 0.05 10*3/MM3 (ref 0–0.2)
BASOPHILS NFR BLD AUTO: 0.7 % (ref 0–1.5)
BLD GP AB SCN SERPL QL: NEGATIVE
DEPRECATED RDW RBC AUTO: 51.7 FL (ref 37–54)
EOSINOPHIL # BLD AUTO: 0.2 10*3/MM3 (ref 0–0.4)
EOSINOPHIL NFR BLD AUTO: 3 % (ref 0.3–6.2)
ERYTHROCYTE [DISTWIDTH] IN BLOOD BY AUTOMATED COUNT: 16 % (ref 12.3–15.4)
HBA1C MFR BLD: 11.2 % (ref 4.8–5.6)
HCT VFR BLD AUTO: 49 % (ref 37.5–51)
HGB BLD-MCNC: 15.5 G/DL (ref 13–17.7)
IMM GRANULOCYTES # BLD AUTO: 0.03 10*3/MM3 (ref 0–0.05)
IMM GRANULOCYTES NFR BLD AUTO: 0.4 % (ref 0–0.5)
INR PPP: 1.69 (ref 0.85–1.16)
LYMPHOCYTES # BLD AUTO: 1.8 10*3/MM3 (ref 0.7–3.1)
LYMPHOCYTES NFR BLD AUTO: 26.7 % (ref 19.6–45.3)
MCH RBC QN AUTO: 27.8 PG (ref 26.6–33)
MCHC RBC AUTO-ENTMCNC: 31.6 G/DL (ref 31.5–35.7)
MCV RBC AUTO: 88 FL (ref 79–97)
MONOCYTES # BLD AUTO: 0.48 10*3/MM3 (ref 0.1–0.9)
MONOCYTES NFR BLD AUTO: 7.1 % (ref 5–12)
NEUTROPHILS NFR BLD AUTO: 4.17 10*3/MM3 (ref 1.7–7)
NEUTROPHILS NFR BLD AUTO: 62.1 % (ref 42.7–76)
NRBC BLD AUTO-RTO: 0 /100 WBC (ref 0–0.2)
PLATELET # BLD AUTO: 178 10*3/MM3 (ref 140–450)
PMV BLD AUTO: 9.9 FL (ref 6–12)
PROTHROMBIN TIME: 19.6 SECONDS (ref 11.5–14)
RBC # BLD AUTO: 5.57 10*6/MM3 (ref 4.14–5.8)
RH BLD: POSITIVE
T&S EXPIRATION DATE: NORMAL
WBC # BLD AUTO: 6.73 10*3/MM3 (ref 3.4–10.8)

## 2020-09-15 PROCEDURE — 85610 PROTHROMBIN TIME: CPT | Performed by: PHYSICIAN ASSISTANT

## 2020-09-15 PROCEDURE — 85730 THROMBOPLASTIN TIME PARTIAL: CPT | Performed by: PHYSICIAN ASSISTANT

## 2020-09-15 PROCEDURE — 86901 BLOOD TYPING SEROLOGIC RH(D): CPT | Performed by: PHYSICIAN ASSISTANT

## 2020-09-15 PROCEDURE — 83036 HEMOGLOBIN GLYCOSYLATED A1C: CPT | Performed by: PHYSICIAN ASSISTANT

## 2020-09-15 PROCEDURE — 86900 BLOOD TYPING SEROLOGIC ABO: CPT | Performed by: PHYSICIAN ASSISTANT

## 2020-09-15 PROCEDURE — 93010 ELECTROCARDIOGRAM REPORT: CPT | Performed by: INTERNAL MEDICINE

## 2020-09-15 PROCEDURE — 36415 COLL VENOUS BLD VENIPUNCTURE: CPT

## 2020-09-15 PROCEDURE — 85025 COMPLETE CBC W/AUTO DIFF WBC: CPT | Performed by: PHYSICIAN ASSISTANT

## 2020-09-15 PROCEDURE — 93005 ELECTROCARDIOGRAM TRACING: CPT

## 2020-09-15 PROCEDURE — U0004 COV-19 TEST NON-CDC HGH THRU: HCPCS

## 2020-09-15 PROCEDURE — C9803 HOPD COVID-19 SPEC COLLECT: HCPCS

## 2020-09-15 PROCEDURE — 86850 RBC ANTIBODY SCREEN: CPT | Performed by: PHYSICIAN ASSISTANT

## 2020-09-15 PROCEDURE — 71046 X-RAY EXAM CHEST 2 VIEWS: CPT

## 2020-09-15 PROCEDURE — 86923 COMPATIBILITY TEST ELECTRIC: CPT

## 2020-09-15 RX ORDER — CHLORHEXIDINE GLUCONATE 500 MG/1
1 CLOTH TOPICAL EVERY 12 HOURS PRN
Status: DISCONTINUED | OUTPATIENT
Start: 2020-09-15 | End: 2020-09-18

## 2020-09-15 NOTE — PAT
Patient to apply Chlorhexadine wipes  to surgical area (as instructed) the night before procedure and the AM of procedure. Wipes provided.  Blood bank bracelet applied to patient during Pre Admission Testing visit.  Patient instructed not to remove from arm until after procedure and they are discharged from the hospital.  Explained to patient that they may shower and get the bracelet wet, but not to immerse under water for longer periods (bathing, swimming, hand dishwashing, etc).  Patient verbalized understanding.  Bactroban was given to patient in PAT with instructions to use the night prior-patient verbalized understanding.  Per Anesthesia Request, patient instructed not to take their ACE/ARB medications on the AM of surgery.

## 2020-09-16 ENCOUNTER — ANESTHESIA EVENT (OUTPATIENT)
Dept: PERIOP | Facility: HOSPITAL | Age: 75
End: 2020-09-16

## 2020-09-16 LAB — SARS-COV-2 RNA NOSE QL NAA+PROBE: NOT DETECTED

## 2020-09-16 RX ORDER — FAMOTIDINE 10 MG/ML
20 INJECTION, SOLUTION INTRAVENOUS ONCE
Status: CANCELLED | OUTPATIENT
Start: 2020-09-16 | End: 2020-09-16

## 2020-09-17 ENCOUNTER — ANESTHESIA (OUTPATIENT)
Dept: PERIOP | Facility: HOSPITAL | Age: 75
End: 2020-09-17

## 2020-09-17 ENCOUNTER — HOSPITAL ENCOUNTER (INPATIENT)
Facility: HOSPITAL | Age: 75
LOS: 1 days | Discharge: HOME | End: 2020-09-18
Attending: THORACIC SURGERY (CARDIOTHORACIC VASCULAR SURGERY) | Admitting: THORACIC SURGERY (CARDIOTHORACIC VASCULAR SURGERY)

## 2020-09-17 ENCOUNTER — APPOINTMENT (OUTPATIENT)
Dept: GENERAL RADIOLOGY | Facility: HOSPITAL | Age: 75
End: 2020-09-17

## 2020-09-17 DIAGNOSIS — Z98.890 S/P AAA REPAIR: Primary | ICD-10-CM

## 2020-09-17 DIAGNOSIS — Z86.79 S/P AAA REPAIR: Primary | ICD-10-CM

## 2020-09-17 DIAGNOSIS — I71.40 ABDOMINAL AORTIC ANEURYSM (AAA) WITHOUT RUPTURE (HCC): ICD-10-CM

## 2020-09-17 PROBLEM — Z79.01 ANTICOAGULATED ON COUMADIN: Status: ACTIVE | Noted: 2020-09-17

## 2020-09-17 PROBLEM — Z85.528 HX OF RENAL CELL CANCER: Status: ACTIVE | Noted: 2020-09-17

## 2020-09-17 LAB
ABO GROUP BLD: NORMAL
ALBUMIN SERPL-MCNC: 4.3 G/DL (ref 3.5–5.2)
ALBUMIN/GLOB SERPL: 1.7 G/DL
ALP SERPL-CCNC: 52 U/L (ref 39–117)
ALT SERPL W P-5'-P-CCNC: 13 U/L (ref 1–41)
ANION GAP SERPL CALCULATED.3IONS-SCNC: 11 MMOL/L (ref 5–15)
AST SERPL-CCNC: 14 U/L (ref 1–40)
BILIRUB SERPL-MCNC: 0.4 MG/DL (ref 0–1.2)
BUN SERPL-MCNC: 29 MG/DL (ref 8–23)
BUN/CREAT SERPL: 19.3 (ref 7–25)
CALCIUM SPEC-SCNC: 9.2 MG/DL (ref 8.6–10.5)
CHLORIDE SERPL-SCNC: 99 MMOL/L (ref 98–107)
CO2 SERPL-SCNC: 26 MMOL/L (ref 22–29)
CREAT SERPL-MCNC: 1.5 MG/DL (ref 0.76–1.27)
GFR SERPL CREATININE-BSD FRML MDRD: 46 ML/MIN/1.73
GLOBULIN UR ELPH-MCNC: 2.6 GM/DL
GLUCOSE BLDC GLUCOMTR-MCNC: 187 MG/DL (ref 70–130)
GLUCOSE BLDC GLUCOMTR-MCNC: 201 MG/DL (ref 70–130)
GLUCOSE BLDC GLUCOMTR-MCNC: 206 MG/DL (ref 70–130)
GLUCOSE BLDC GLUCOMTR-MCNC: 223 MG/DL (ref 70–130)
GLUCOSE SERPL-MCNC: 213 MG/DL (ref 65–99)
INR PPP: 1.14 (ref 0.85–1.16)
POTASSIUM SERPL-SCNC: 4.6 MMOL/L (ref 3.5–5.2)
PROT SERPL-MCNC: 6.9 G/DL (ref 6–8.5)
PROTHROMBIN TIME: 14.4 SECONDS (ref 11.5–14)
RH BLD: POSITIVE
SODIUM SERPL-SCNC: 136 MMOL/L (ref 136–145)

## 2020-09-17 PROCEDURE — C1760 CLOSURE DEV, VASC: HCPCS | Performed by: THORACIC SURGERY (CARDIOTHORACIC VASCULAR SURGERY)

## 2020-09-17 PROCEDURE — 25010000002 ONDANSETRON PER 1 MG: Performed by: NURSE ANESTHETIST, CERTIFIED REGISTERED

## 2020-09-17 PROCEDURE — C1887 CATHETER, GUIDING: HCPCS | Performed by: THORACIC SURGERY (CARDIOTHORACIC VASCULAR SURGERY)

## 2020-09-17 PROCEDURE — C1769 GUIDE WIRE: HCPCS | Performed by: THORACIC SURGERY (CARDIOTHORACIC VASCULAR SURGERY)

## 2020-09-17 PROCEDURE — 34713 PERQ ACCESS & CLSR FEM ART: CPT | Performed by: STUDENT IN AN ORGANIZED HEALTH CARE EDUCATION/TRAINING PROGRAM

## 2020-09-17 PROCEDURE — 34713 PERQ ACCESS & CLSR FEM ART: CPT | Performed by: THORACIC SURGERY (CARDIOTHORACIC VASCULAR SURGERY)

## 2020-09-17 PROCEDURE — 80053 COMPREHEN METABOLIC PANEL: CPT | Performed by: THORACIC SURGERY (CARDIOTHORACIC VASCULAR SURGERY)

## 2020-09-17 PROCEDURE — 86900 BLOOD TYPING SEROLOGIC ABO: CPT

## 2020-09-17 PROCEDURE — 82962 GLUCOSE BLOOD TEST: CPT

## 2020-09-17 PROCEDURE — 99233 SBSQ HOSP IP/OBS HIGH 50: CPT | Performed by: INTERNAL MEDICINE

## 2020-09-17 PROCEDURE — 94640 AIRWAY INHALATION TREATMENT: CPT

## 2020-09-17 PROCEDURE — 34812 OPN FEM ART EXPOS: CPT | Performed by: STUDENT IN AN ORGANIZED HEALTH CARE EDUCATION/TRAINING PROGRAM

## 2020-09-17 PROCEDURE — C1894 INTRO/SHEATH, NON-LASER: HCPCS | Performed by: THORACIC SURGERY (CARDIOTHORACIC VASCULAR SURGERY)

## 2020-09-17 PROCEDURE — 63710000001 INSULIN LISPRO (HUMAN) PER 5 UNITS: Performed by: INTERNAL MEDICINE

## 2020-09-17 PROCEDURE — 04V03DZ RESTRICTION OF ABDOMINAL AORTA WITH INTRALUMINAL DEVICE, PERCUTANEOUS APPROACH: ICD-10-PCS | Performed by: THORACIC SURGERY (CARDIOTHORACIC VASCULAR SURGERY)

## 2020-09-17 PROCEDURE — 25010000002 IOPAMIDOL 61 % SOLUTION: Performed by: THORACIC SURGERY (CARDIOTHORACIC VASCULAR SURGERY)

## 2020-09-17 PROCEDURE — 34705 EVAC RPR A-BIILIAC NDGFT: CPT | Performed by: STUDENT IN AN ORGANIZED HEALTH CARE EDUCATION/TRAINING PROGRAM

## 2020-09-17 PROCEDURE — 25010000003 LIDOCAINE 1 % SOLUTION: Performed by: NURSE ANESTHETIST, CERTIFIED REGISTERED

## 2020-09-17 PROCEDURE — 25010000002 CEFUROXIME: Performed by: PHYSICIAN ASSISTANT

## 2020-09-17 PROCEDURE — 86901 BLOOD TYPING SEROLOGIC RH(D): CPT

## 2020-09-17 PROCEDURE — 25010000002 HEPARIN (PORCINE) PER 1000 UNITS: Performed by: NURSE ANESTHETIST, CERTIFIED REGISTERED

## 2020-09-17 PROCEDURE — 25010000002 FENTANYL CITRATE (PF) 100 MCG/2ML SOLUTION: Performed by: NURSE ANESTHETIST, CERTIFIED REGISTERED

## 2020-09-17 PROCEDURE — 34812 OPN FEM ART EXPOS: CPT | Performed by: THORACIC SURGERY (CARDIOTHORACIC VASCULAR SURGERY)

## 2020-09-17 PROCEDURE — 85610 PROTHROMBIN TIME: CPT | Performed by: THORACIC SURGERY (CARDIOTHORACIC VASCULAR SURGERY)

## 2020-09-17 PROCEDURE — C1725 CATH, TRANSLUMIN NON-LASER: HCPCS | Performed by: THORACIC SURGERY (CARDIOTHORACIC VASCULAR SURGERY)

## 2020-09-17 PROCEDURE — 25010000003 CEFUROXIME SODIUM 1.5 G RECONSTITUTED SOLUTION: Performed by: PHYSICIAN ASSISTANT

## 2020-09-17 PROCEDURE — 25010000002 NEOSTIGMINE 10 MG/10ML SOLUTION: Performed by: NURSE ANESTHETIST, CERTIFIED REGISTERED

## 2020-09-17 PROCEDURE — 94799 UNLISTED PULMONARY SVC/PX: CPT

## 2020-09-17 PROCEDURE — 25010000002 PROPOFOL 10 MG/ML EMULSION: Performed by: NURSE ANESTHETIST, CERTIFIED REGISTERED

## 2020-09-17 PROCEDURE — 25010000002 HEPARIN (PORCINE) PER 1000 UNITS: Performed by: THORACIC SURGERY (CARDIOTHORACIC VASCULAR SURGERY)

## 2020-09-17 PROCEDURE — 34705 EVAC RPR A-BIILIAC NDGFT: CPT | Performed by: THORACIC SURGERY (CARDIOTHORACIC VASCULAR SURGERY)

## 2020-09-17 DEVICE — GRFT EXCLDR CONTRALAT 12F 18MM 11.5CM: Type: IMPLANTABLE DEVICE | Status: FUNCTIONAL

## 2020-09-17 DEVICE — GRFT EXCLDR CONTRALAT 12F 18MM 9.5CM: Type: IMPLANTABLE DEVICE | Status: FUNCTIONAL

## 2020-09-17 DEVICE — GRFT EXCLDR C3 TRNK I/LAT 31X14.5MM13CM: Type: IMPLANTABLE DEVICE | Status: FUNCTIONAL

## 2020-09-17 RX ORDER — SODIUM CHLORIDE 9 MG/ML
10 INJECTION, SOLUTION INTRAVENOUS CONTINUOUS
Status: DISCONTINUED | OUTPATIENT
Start: 2020-09-17 | End: 2020-09-17

## 2020-09-17 RX ORDER — DEXTROSE MONOHYDRATE 25 G/50ML
25 INJECTION, SOLUTION INTRAVENOUS
Status: DISCONTINUED | OUTPATIENT
Start: 2020-09-17 | End: 2020-09-18 | Stop reason: HOSPADM

## 2020-09-17 RX ORDER — SODIUM CHLORIDE 9 MG/ML
9 INJECTION, SOLUTION INTRAVENOUS CONTINUOUS
Status: DISCONTINUED | OUTPATIENT
Start: 2020-09-17 | End: 2020-09-17

## 2020-09-17 RX ORDER — ISOSORBIDE MONONITRATE 60 MG/1
60 TABLET, EXTENDED RELEASE ORAL DAILY
Status: DISCONTINUED | OUTPATIENT
Start: 2020-09-17 | End: 2020-09-18 | Stop reason: HOSPADM

## 2020-09-17 RX ORDER — ONDANSETRON 4 MG/1
4 TABLET, FILM COATED ORAL EVERY 6 HOURS PRN
Status: DISCONTINUED | OUTPATIENT
Start: 2020-09-17 | End: 2020-09-18 | Stop reason: HOSPADM

## 2020-09-17 RX ORDER — BISACODYL 10 MG
10 SUPPOSITORY, RECTAL RECTAL DAILY PRN
Status: DISCONTINUED | OUTPATIENT
Start: 2020-09-17 | End: 2020-09-18 | Stop reason: HOSPADM

## 2020-09-17 RX ORDER — LIDOCAINE HYDROCHLORIDE 10 MG/ML
INJECTION, SOLUTION INFILTRATION; PERINEURAL AS NEEDED
Status: DISCONTINUED | OUTPATIENT
Start: 2020-09-17 | End: 2020-09-17 | Stop reason: SURG

## 2020-09-17 RX ORDER — GLYCOPYRROLATE 0.2 MG/ML
INJECTION INTRAMUSCULAR; INTRAVENOUS AS NEEDED
Status: DISCONTINUED | OUTPATIENT
Start: 2020-09-17 | End: 2020-09-17 | Stop reason: SURG

## 2020-09-17 RX ORDER — PROPOFOL 10 MG/ML
VIAL (ML) INTRAVENOUS AS NEEDED
Status: DISCONTINUED | OUTPATIENT
Start: 2020-09-17 | End: 2020-09-17 | Stop reason: SURG

## 2020-09-17 RX ORDER — ONDANSETRON 2 MG/ML
INJECTION INTRAMUSCULAR; INTRAVENOUS AS NEEDED
Status: DISCONTINUED | OUTPATIENT
Start: 2020-09-17 | End: 2020-09-17 | Stop reason: SURG

## 2020-09-17 RX ORDER — SODIUM CHLORIDE 450 MG/100ML
35 INJECTION, SOLUTION INTRAVENOUS CONTINUOUS
Status: DISCONTINUED | OUTPATIENT
Start: 2020-09-17 | End: 2020-09-18

## 2020-09-17 RX ORDER — LEVOTHYROXINE AND LIOTHYRONINE 19; 4.5 UG/1; UG/1
15 TABLET ORAL
Status: DISCONTINUED | OUTPATIENT
Start: 2020-09-18 | End: 2020-09-18 | Stop reason: HOSPADM

## 2020-09-17 RX ORDER — LEVOTHYROXINE SODIUM 112 UG/1
112 TABLET ORAL
Status: DISCONTINUED | OUTPATIENT
Start: 2020-09-18 | End: 2020-09-18 | Stop reason: HOSPADM

## 2020-09-17 RX ORDER — HYDROCODONE BITARTRATE AND ACETAMINOPHEN 7.5; 325 MG/1; MG/1
1 TABLET ORAL EVERY 6 HOURS PRN
Status: DISCONTINUED | OUTPATIENT
Start: 2020-09-17 | End: 2020-09-18 | Stop reason: HOSPADM

## 2020-09-17 RX ORDER — NEOSTIGMINE METHYLSULFATE 1 MG/ML
INJECTION, SOLUTION INTRAVENOUS AS NEEDED
Status: DISCONTINUED | OUTPATIENT
Start: 2020-09-17 | End: 2020-09-17 | Stop reason: SURG

## 2020-09-17 RX ORDER — SODIUM CHLORIDE 0.9 % (FLUSH) 0.9 %
10 SYRINGE (ML) INJECTION AS NEEDED
Status: DISCONTINUED | OUTPATIENT
Start: 2020-09-17 | End: 2020-09-17 | Stop reason: HOSPADM

## 2020-09-17 RX ORDER — AMOXICILLIN 250 MG
2 CAPSULE ORAL 2 TIMES DAILY PRN
Status: DISCONTINUED | OUTPATIENT
Start: 2020-09-17 | End: 2020-09-18 | Stop reason: HOSPADM

## 2020-09-17 RX ORDER — LIDOCAINE HYDROCHLORIDE 10 MG/ML
0.5 INJECTION, SOLUTION EPIDURAL; INFILTRATION; INTRACAUDAL; PERINEURAL ONCE AS NEEDED
Status: COMPLETED | OUTPATIENT
Start: 2020-09-17 | End: 2020-09-17

## 2020-09-17 RX ORDER — NICOTINE POLACRILEX 4 MG
15 LOZENGE BUCCAL
Status: DISCONTINUED | OUTPATIENT
Start: 2020-09-17 | End: 2020-09-18 | Stop reason: HOSPADM

## 2020-09-17 RX ORDER — IPRATROPIUM BROMIDE AND ALBUTEROL SULFATE 2.5; .5 MG/3ML; MG/3ML
3 SOLUTION RESPIRATORY (INHALATION)
Status: COMPLETED | OUTPATIENT
Start: 2020-09-17 | End: 2020-09-17

## 2020-09-17 RX ORDER — ROSUVASTATIN CALCIUM 20 MG/1
40 TABLET, COATED ORAL NIGHTLY
Status: DISCONTINUED | OUTPATIENT
Start: 2020-09-17 | End: 2020-09-18 | Stop reason: HOSPADM

## 2020-09-17 RX ORDER — ESMOLOL HYDROCHLORIDE 10 MG/ML
INJECTION INTRAVENOUS AS NEEDED
Status: DISCONTINUED | OUTPATIENT
Start: 2020-09-17 | End: 2020-09-17 | Stop reason: SURG

## 2020-09-17 RX ORDER — CLOPIDOGREL BISULFATE 75 MG/1
75 TABLET ORAL DAILY
Status: DISCONTINUED | OUTPATIENT
Start: 2020-09-17 | End: 2020-09-18 | Stop reason: HOSPADM

## 2020-09-17 RX ORDER — NITROGLYCERIN 0.4 MG/1
0.4 TABLET SUBLINGUAL
Status: DISCONTINUED | OUTPATIENT
Start: 2020-09-17 | End: 2020-09-18 | Stop reason: HOSPADM

## 2020-09-17 RX ORDER — SODIUM CHLORIDE 0.9 % (FLUSH) 0.9 %
10 SYRINGE (ML) INJECTION EVERY 12 HOURS SCHEDULED
Status: DISCONTINUED | OUTPATIENT
Start: 2020-09-17 | End: 2020-09-17 | Stop reason: HOSPADM

## 2020-09-17 RX ORDER — FENTANYL CITRATE 50 UG/ML
INJECTION, SOLUTION INTRAMUSCULAR; INTRAVENOUS AS NEEDED
Status: DISCONTINUED | OUTPATIENT
Start: 2020-09-17 | End: 2020-09-17 | Stop reason: SURG

## 2020-09-17 RX ORDER — ATRACURIUM BESYLATE 10 MG/ML
INJECTION, SOLUTION INTRAVENOUS AS NEEDED
Status: DISCONTINUED | OUTPATIENT
Start: 2020-09-17 | End: 2020-09-17 | Stop reason: SURG

## 2020-09-17 RX ORDER — FAMOTIDINE 20 MG/1
20 TABLET, FILM COATED ORAL ONCE
Status: COMPLETED | OUTPATIENT
Start: 2020-09-17 | End: 2020-09-17

## 2020-09-17 RX ORDER — ATENOLOL 50 MG/1
50 TABLET ORAL DAILY
Status: DISCONTINUED | OUTPATIENT
Start: 2020-09-18 | End: 2020-09-18 | Stop reason: HOSPADM

## 2020-09-17 RX ORDER — HEPARIN SODIUM 1000 [USP'U]/ML
INJECTION, SOLUTION INTRAVENOUS; SUBCUTANEOUS AS NEEDED
Status: DISCONTINUED | OUTPATIENT
Start: 2020-09-17 | End: 2020-09-17 | Stop reason: SURG

## 2020-09-17 RX ORDER — SODIUM CHLORIDE, SODIUM LACTATE, POTASSIUM CHLORIDE, CALCIUM CHLORIDE 600; 310; 30; 20 MG/100ML; MG/100ML; MG/100ML; MG/100ML
9 INJECTION, SOLUTION INTRAVENOUS CONTINUOUS
Status: DISCONTINUED | OUTPATIENT
Start: 2020-09-17 | End: 2020-09-17

## 2020-09-17 RX ORDER — ASPIRIN 81 MG/1
81 TABLET ORAL DAILY
Status: DISCONTINUED | OUTPATIENT
Start: 2020-09-17 | End: 2020-09-18 | Stop reason: HOSPADM

## 2020-09-17 RX ORDER — ONDANSETRON 2 MG/ML
4 INJECTION INTRAMUSCULAR; INTRAVENOUS EVERY 6 HOURS PRN
Status: DISCONTINUED | OUTPATIENT
Start: 2020-09-17 | End: 2020-09-18 | Stop reason: HOSPADM

## 2020-09-17 RX ADMIN — LOSARTAN POTASSIUM: 50 TABLET, FILM COATED ORAL at 19:30

## 2020-09-17 RX ADMIN — NICARDIPINE HYDROCHLORIDE 10 MG/HR: 0.1 INJECTION, SOLUTION INTRAVENOUS at 18:07

## 2020-09-17 RX ADMIN — IPRATROPIUM BROMIDE AND ALBUTEROL SULFATE 3 ML: 2.5; .5 SOLUTION RESPIRATORY (INHALATION) at 12:46

## 2020-09-17 RX ADMIN — ATRACURIUM BESYLATE 60 MG: 10 INJECTION, SOLUTION INTRAVENOUS at 13:57

## 2020-09-17 RX ADMIN — FAMOTIDINE 20 MG: 20 TABLET, FILM COATED ORAL at 10:05

## 2020-09-17 RX ADMIN — SODIUM CHLORIDE 35 ML/HR: 4.5 INJECTION, SOLUTION INTRAVENOUS at 16:55

## 2020-09-17 RX ADMIN — ASPIRIN 81 MG: 81 TABLET, COATED ORAL at 17:15

## 2020-09-17 RX ADMIN — EPHEDRINE SULFATE 10 MG: 50 INJECTION INTRAMUSCULAR; INTRAVENOUS; SUBCUTANEOUS at 14:06

## 2020-09-17 RX ADMIN — SODIUM CHLORIDE 10 ML/HR: 9 INJECTION, SOLUTION INTRAVENOUS at 09:55

## 2020-09-17 RX ADMIN — ONDANSETRON 4 MG: 2 INJECTION INTRAMUSCULAR; INTRAVENOUS at 15:05

## 2020-09-17 RX ADMIN — SODIUM CHLORIDE 9 ML/HR: 9 INJECTION, SOLUTION INTRAVENOUS at 10:00

## 2020-09-17 RX ADMIN — NICARDIPINE HYDROCHLORIDE 5 MG/HR: 0.1 INJECTION, SOLUTION INTRAVENOUS at 15:03

## 2020-09-17 RX ADMIN — INSULIN LISPRO 4 UNITS: 100 INJECTION, SOLUTION INTRAVENOUS; SUBCUTANEOUS at 19:30

## 2020-09-17 RX ADMIN — GLYCOPYRROLATE 0.4 MG: 0.2 INJECTION INTRAMUSCULAR; INTRAVENOUS at 15:06

## 2020-09-17 RX ADMIN — NEOSTIGMINE 3 MG: 1 INJECTION INTRAVENOUS at 15:06

## 2020-09-17 RX ADMIN — LIDOCAINE HYDROCHLORIDE 0.5 ML: 10 INJECTION, SOLUTION EPIDURAL; INFILTRATION; INTRACAUDAL; PERINEURAL at 09:55

## 2020-09-17 RX ADMIN — SODIUM CHLORIDE, POTASSIUM CHLORIDE, SODIUM LACTATE AND CALCIUM CHLORIDE 9 ML/HR: 600; 310; 30; 20 INJECTION, SOLUTION INTRAVENOUS at 09:55

## 2020-09-17 RX ADMIN — NICARDIPINE HYDROCHLORIDE 5 MG/HR: 0.1 INJECTION, SOLUTION INTRAVENOUS at 23:35

## 2020-09-17 RX ADMIN — Medication 10 ML: at 09:55

## 2020-09-17 RX ADMIN — ROSUVASTATIN CALCIUM 40 MG: 20 TABLET, COATED ORAL at 21:11

## 2020-09-17 RX ADMIN — LIDOCAINE HYDROCHLORIDE 50 MG: 10 INJECTION, SOLUTION INFILTRATION; PERINEURAL at 13:57

## 2020-09-17 RX ADMIN — FENTANYL CITRATE 50 MCG: 50 INJECTION, SOLUTION INTRAMUSCULAR; INTRAVENOUS at 13:57

## 2020-09-17 RX ADMIN — ISOSORBIDE MONONITRATE 60 MG: 60 TABLET, EXTENDED RELEASE ORAL at 17:15

## 2020-09-17 RX ADMIN — CLOPIDOGREL BISULFATE 75 MG: 75 TABLET ORAL at 17:15

## 2020-09-17 RX ADMIN — HEPARIN SODIUM 5000 UNITS: 1000 INJECTION, SOLUTION INTRAVENOUS; SUBCUTANEOUS at 14:14

## 2020-09-17 RX ADMIN — PROPOFOL 200 MG: 10 INJECTION, EMULSION INTRAVENOUS at 13:57

## 2020-09-17 RX ADMIN — NICARDIPINE HYDROCHLORIDE 5 MG/HR: 0.1 INJECTION, SOLUTION INTRAVENOUS at 16:45

## 2020-09-17 RX ADMIN — CEFUROXIME SODIUM 1.5 G: 1.5 INJECTION, POWDER, FOR SOLUTION INTRAVENOUS at 21:11

## 2020-09-17 RX ADMIN — ESMOLOL HYDROCHLORIDE 30 MG: 10 INJECTION, SOLUTION INTRAVENOUS at 14:50

## 2020-09-17 RX ADMIN — CEFUROXIME 1.5 G: 1.5 INJECTION, POWDER, FOR SOLUTION INTRAVENOUS at 13:50

## 2020-09-17 NOTE — ANESTHESIA PROCEDURE NOTES
Airway  Urgency: elective    Date/Time: 9/17/2020 2:00 PM  Airway not difficult    General Information and Staff    Patient location during procedure: OR  CRNA: Lea Appiah CRNA    Indications and Patient Condition  Indications for airway management: airway protection    Preoxygenated: yes  MILS maintained throughout  Mask difficulty assessment: 2 - vent by mask + OA or adjuvant +/- NMBA    Final Airway Details  Final airway type: endotracheal airway      Successful airway: ETT  Cuffed: yes   Successful intubation technique: direct laryngoscopy  Endotracheal tube insertion site: oral  Blade: Christina  Blade size: 2  ETT size (mm): 8.0  Cormack-Lehane Classification: grade I - full view of glottis  Placement verified by: chest auscultation and capnometry   Cuff volume (mL): 6  Measured from: lips  ETT/EBT  to lips (cm): 22  Number of attempts at approach: 1  Assessment: lips, teeth, and gum same as pre-op and atraumatic intubation    Additional Comments  Pt to OR 15. Pt moved self to OR table. ASA monitors placed. Pre-O2 with 100% Oxygen. SIVI. Atraumatic intubation. +ETCO2, +BBS.

## 2020-09-17 NOTE — OP NOTE
DATE OF PROCEDURE: 9/17/2020      PREOPERATIVE DIAGNOSES:  1. Abdominal aortic aneurysm  2. Hypertension  3. Renal cancer status post left nephrectomy      POSTOPERATIVE DIAGNOSES:    1. Abdominal aortic aneurysm  2. Hypertension  3. Renal cancer status post left nephrectomy      PROCEDURES PERFORMED:    1. Aortogram  2. Endovascular repair of abdominal aortic aneurysm  3. Completion aortogram     SURGEON: Flynn Edwards MD        ASSISTANTS:    1. Junior Huddleston MD was responsible for performing the following activities: Retraction and Suction and their skilled assistance was necessary for the success of this case.   2. Louis Colbert MD was responsible for performing the following activities: Retraction and Suction and their skilled assistance was necessary for the success of this case.     Circulator: Mayela Keen RN; Sangeeta Mayo RN  Radiology Technologist: Keegan Bull  Scrub Person: Amarilys Alfaro; Eloisa Villagran  Nursing Assistant: Sera Zimmerman      ANESTHESIA: General endotracheal anesthesia with Lea Appiah CRNA     ESTIMATED BLOOD LOSS:  Less than 50 mL     TOTAL FLUOROSCOPY TIME: 13:12     EXPOSURE: 716 mGy     CONTRAST:  70 mL      INDICATIONS:  74-year-old  male with a history of hypertension, hyperlipidemia, diabetes mellitus, coronary artery disease status post CABG, renal cancer, COPD and active tobacco abuse who presented with an asymptomatic 6.4 cm infrarenal abdominal aortic aneurysm. The patient was felt to be a reasonable candidate for endovascular repair. The risks and benefits of surgery were discussed with the patient including pain, bleeding, infection, renal failure, myocardial infarction and death. The patient understood these risks and wished to proceed with surgery.       DESCRIPTION OF PROCEDURE:  The patient was taken to the operating room and placed under general endotracheal anesthesia.  He was prepped and draped in the usual sterile fashion and  a timeout was performed including the patient's name, procedure and antibiotic administration.  Bilateral common femoral artery access was obtained over the femoral head using fluoroscopic guidance.  5000 units of heparin was administered systemically.  The arteries were dilated with a 7 German dilator and two Perclose devices were deployed within each artery and secured for later closure.  The 7 German sheaths were deployed using modified Seldinger technique and wire access of the proximal descending thoracic aorta was obtained using a Magic torque wire.  This wire was then exchanged for an Amplatz and an 18 German sheath was placed in the left groin and a 12 German sheath in the right groin.  A 31 mm x 14 mm x 13 cm Papillion endoprosthesis main body graft was placed within the left sheath.  The pigtail catheter was then placed at the level of L1 and aortogram performed revealed a patent right renal artery and surgically absent left renal artery.  The known infrarenal abdominal aortic aneurysm was seen.  The main body graft was then deployed below the renal artery ostia.  The gate was then cannulated through the right groin using a Berenstein catheter and angled Glidewire.  This was proven using a pigtail catheter within the main body.  An aortogram demonstrated the bilateral internal iliac arteries and an 18 mm x 12 cm Papillion endoprosthesis was selected and deployed above the right iliac bifurcation.  The remainder of the main body graft was deployed above the left iliac bifurcation.  An 18 mm x 10 cm Papillion endoprosthesis was deployed above the left iliac artery bifurcation.  The graft was then dilated bilaterally using a Q50 balloon.  Completion aortogram did not reveal any evidence of endoleak and there was satisfactory filling of the iliac vessels.  The bilateral groin sheaths were removed and Perclose devices deployed with satisfactory hemostasis.  Overlying skin glue was applied to the incisions.  He had excellent  pedal pulses on Doppler bilaterally at the end of the case.  The patient was transported to recovery in stable condition, extubated.

## 2020-09-17 NOTE — PLAN OF CARE
Goal Outcome Evaluation:  Plan of Care Reviewed With: patient, daughter      S/p AAA repair. Cardene gtt to maintain SBP<120. SB with heart rate 48-50's.  2liters nasal cannula.

## 2020-09-17 NOTE — PROGRESS NOTES
Intensivist Note     9/17/2020  Hospital Day: 0  Day of Surgery  ICU Stays Timeline            Hospital Admission: 09/17/20 0914 - Current  ICU stays: 1      In Date/Time Event Department ICU Stay Duration     09/17/20 0914 Admission  [facility]      09/17/20 1637 Transfer In  [facility] 1 hour 11 minutes             Mr. Cal Frausto, 74 y.o. male is followed for:    Abdominal aortic aneurysm without rupture (6.4 cm) (CMS/[facility])    S/P AAA endograft repair 9/17/2020    CAD involving coronary bypass graft of native heart. CABG 2004, stent October 2019    Essential hypertension    Dyslipidemia    Type 2 diabetes mellitus. HbA1c 11.2 (CMS/[facility])    History DVT left leg 2016 on chronic anticoagulation with Coumadin (CMS/[facility])    Anticoagulated on Coumadin. Held 2 days prior to surgery    Hx of renal cell cancer. Left nephrectomy 10/2019    Tobacco abuse    COPD in active smoker. On home oxygen (CMS/[facility])    Hypothyroidism       SUBJECTIVE     74-year-old white male smoker with a history of CAD (remote CABG 4/2004, and stent to left circumflex 5/4/2018), hypertension, hyperlipidemia, AAA followed for many years, DM (HbA1c 11.2), remote DVT 9/2016 on chronic Coumadin, COPD, renal cell CA status post left nephrectomy 10/2019, hypothyroidism on replacement, history of childhood seizures, and remote EtOH abuse.  Patient has been followed by general surgery for years regarding his AAA.  Recently Dr. Dey noted it had increased to 6.4 cm.  Patient subsequently referred to Dr. Edwards 8/25/2020 at which time the decision was made to proceed with endovascular repair.  Patient was subsequently admitted today for elective repair.      Interval history: Endograft repair was performed uneventfully and patient now returns to the ICU for close monitoring.  Operative report not available to me at this time.  Denies any significant pain other than some mild discomfort at his inguinal access sites.  Denies cough, purulent sputum production,  hemoptysis, or pleuritic pain.  Denies dyspnea and is at present on room air.      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  None since 1977    • History of seizures as a child    • Hypertension    • Hypothyroidism    • History of pneumonia    • Childhood seizures (CMS/[facility])    • Tobacco abuse ongoing    • Type 2 diabetes mellitus. Poorly controlled (CMS/[facility])    .  Past Surgical History:   Procedure Laterality Date   • CARDIAC CATHETERIZATION  11/2004    revealed an occluded saphenous vein graft to the RCA. There was an 80% left main with an occluded LAD with a patent LIMA graft to the LAD. There was also an occluded OM1 with a patent saphenous vein graft to the OM.   • CARDIAC CATHETERIZATION  07/30/2013    with 100% RCA. Occluded SVG. With 100% LAD. Patent LINDSEY with distal LAD occlusion. Widely patent SVG to ramus and OM-1, filling other natives of the collaterals with normal LVEF.   • CARDIAC CATHETERIZATION N/A 5/4/2018    Procedure: Left Heart Cath;  Surgeon: Keegan Aguilera MD;  Location: [facility] CATH INVASIVE LOCATION;  Service: Cardiovascular   • CARPAL TUNNEL RELEASE     • COLONOSCOPY  2018   • CORONARY ANGIOPLASTY WITH STENT PLACEMENT     • CORONARY ARTERY BYPASS GRAFT  2004    x3   • CYST REMOVAL Left     wrist x2   • KIDNEY SURGERY.  Left nephrectomy  2018   • POLYPECTOMY         Current Outpatient Medications on File Prior to Encounter   Medication Sig Dispense Refill   • aspirin 81 MG tablet Take 81 mg by mouth daily.     • atenolol (TENORMIN) 50 MG tablet Take 50 mg by mouth daily.     • fenofibrate 160 MG tablet Take 160  mg by mouth daily.     • isosorbide mononitrate (IMDUR) 60 MG 24 hr tablet Take 60 mg by mouth daily.     • levothyroxine (SYNTHROID, LEVOTHROID) 112 MCG tablet Take 112 mcg by mouth daily.     • losartan-hydrochlorothiazide (HYZAAR) 100-25 MG per tablet Take 0.5 tablets by mouth Daily.     • O2 (OXYGEN) Inhale 2 L/min Every Night.     • rosuvastatin (CRESTOR) 40 MG tablet Take 1 tablet by mouth Every Night. 90 tablet 3   • SITagliptin (Januvia) 100 MG tablet Take 100 mg by mouth Daily.     • thyroid (ARMOUR) 15 MG tablet Take 15 mg by mouth daily.     • nitroglycerin (NITROSTAT) 0.4 MG SL tablet 1 under the tongue as needed for angina 25 tablet 3   • warfarin (COUMADIN) 3 MG tablet Take 5 mg by mouth Daily. Last dose 9-12-20 per Dr. Edwards's orders     • warfarin (COUMADIN) 4 MG tablet Alternating dosages; last dose 9-12-20 per Dr. Edwards's orders       No Known Allergies    The patient's relevant PMH, PSH, FH, and SH were reviewed and updated in Epic as appropriate. Allergies and Medications reviewed.    OBJECTIVE     /68   Pulse 57   Temp 97.5 °F (36.4 °C)   Resp 16   SpO2 95%      Flow (L/min): 2    Exam:  General Exam:  Elderly white male propped up slightly in bed in NAD  HEENT: Pupils equal and reactive. Nose and throat clear.  Neck:                          Supple, no JVD, thyromegaly, or adenopathy  Lungs: Clear anteriorly and laterally.  Cardiovascular: RRR without murmurs or gallops.  Abdomen: Soft nontender without organomegaly or masses.   and rectal: Deferred.  Extremities: No cyanosis clubbing edema.  Right radial arterial line bilateral inguinal access sites covered with dressings (clean and dry)  Neurologic:                 Symmetric strength. No focal deficits.    PA and lateral CXR 9/15/2020: Normal heart size.  Sternal wires and graft markers from previous CABG.  Lung fields entirely clear without pleural-parenchymal changes.    INFUSIONS  niCARdipine, 5-15 mg/hr, Last Rate: 10 mg/hr  (09/17/20 1807)  sodium chloride, 35 mL/hr, Last Rate: 35 mL/hr (09/17/20 1655)        Results from last 7 days   Lab Units 09/15/20  1125   WBC 10*3/mm3 6.73   HEMOGLOBIN g/dL 15.5   HEMATOCRIT % 49.0   PLATELETS 10*3/mm3 178     Results from last 7 days   Lab Units 09/17/20  1034   SODIUM mmol/L 136   POTASSIUM mmol/L 4.6   CHLORIDE mmol/L 99   CO2 mmol/L 26.0   BUN mg/dL 29*   CREATININE mg/dL 1.50*   GLUCOSE mg/dL 213*   CALCIUM mg/dL 9.2         Results from last 7 days   Lab Units 09/17/20  1034   ALK PHOS U/L 52   BILIRUBIN mg/dL 0.4   ALT (SGPT) U/L 13   AST (SGOT) U/L 14       No results found for: SEDRATE  Lab Results   Component Value Date    BNP 65.0 09/18/2016     No results found for: CKTOTAL, CKMB, CKMBINDEX, TROPONINI, TROPONINT  No results found for: TSH  No results found for: LACTATE  No results found for: CORTISOL      I reviewed the patient's results, images and medication.    Assessment/Plan   ASSESSMENT        Abdominal aortic aneurysm without rupture (6.4 cm) (CMS/[facility])    S/P AAA endograft repair 9/17/2020    CAD involving coronary bypass graft of native heart. CABG 2004, stent October 2019    Essential hypertension    Dyslipidemia    Type 2 diabetes mellitus. HbA1c 11.2 (CMS/[facility])    History DVT left leg 2016 on chronic anticoagulation with Coumadin (CMS/[facility])    Anticoagulated on Coumadin. Held 2 days prior to surgery    Hx of renal cell cancer. Left nephrectomy 10/2019    Tobacco abuse    COPD in active smoker. On home oxygen (CMS/[facility])    Hypothyroidism      DISCUSSION: Seems to have done well with his endograft repair.  Hemoglobin A1c 11.2 so obviously does not take care of himself and is presumably noncompliant.  Is on Januvia at home but does not check his blood sugars.  Lives by himself and cooks his own meals.  Without better control will develop progressive renal failure and all the other complications of diabetes.  Additionally he is on chronic Coumadin since 2016 presumably due to  DVT.  Not sure why he requires chronic Coumadin.    PLAN     1.  Diabetic educator and dietitian consult  2.  Accu-Cheks and see what his blood sugars do while here  3.  Diabetic/cardiac diet  4.  Smoking cessation  5.  Ulcer and DVT prophylaxis.  6.  Resume most of his home medicines  7.  Should follow-up with his primary physician regarding the continued need for Coumadin  8.  Probably home in a.m. per surgery    Plan of care and goals reviewed with mulitdisciplinary team at daily rounds.    I discussed the patient's findings and my recommendations with patient, family and nursing staff    Time spent Critical care 30 min (It does not include procedure time).    Electronically signed by Flynn Lutz MD, 09/17/20, 5:48 PM EDT.   Pulmonary / Critical care medicine

## 2020-09-17 NOTE — H&P
Pre-Op H&P  Cal Frausto  [id number]  1945    Chief complaint: Abdominal aortic aneurysm    HPI:  Patient is a 74 y.o.male who presents with a history of being followed for abdominal aortic aneurysm.  Recently found to be enlarging on CT scan follow-up.  He denies any recent changes in his general health.  History of CABG and renal cancer    Review of Systems:  General ROS: negative for chills, fever or skin lesions;  No changes since last office visit.  Neg for recent sick exposure  Cardiovascular ROS: no chest pain or dyspnea on exertion  Respiratory ROS: no cough, shortness of breath, or wheezing    Allergies: No Known Allergies    Home Meds:    No current facility-administered medications on file prior to encounter.      Current Outpatient Medications on File Prior to Encounter   Medication Sig Dispense Refill   • aspirin 81 MG tablet Take 81 mg by mouth daily.     • atenolol (TENORMIN) 50 MG tablet Take 50 mg by mouth daily.     • fenofibrate 160 MG tablet Take 160 mg by mouth daily.     • isosorbide mononitrate (IMDUR) 60 MG 24 hr tablet Take 60 mg by mouth daily.     • levothyroxine (SYNTHROID, LEVOTHROID) 112 MCG tablet Take 112 mcg by mouth daily.     • losartan-hydrochlorothiazide (HYZAAR) 100-25 MG per tablet Take 1 tablet by mouth daily.     • O2 (OXYGEN) Inhale 2 L/min Every Night.     • rosuvastatin (CRESTOR) 40 MG tablet Take 1 tablet by mouth Every Night. 90 tablet 3   • SITagliptin (Januvia) 100 MG tablet Take 100 mg by mouth Daily.     • thyroid (ARMOUR) 15 MG tablet Take 15 mg by mouth daily.     • nitroglycerin (NITROSTAT) 0.4 MG SL tablet 1 under the tongue as needed for angina, may repeat q5mins for up three doses (Patient taking differently: Place 0.4 mg under the tongue Every 5 (Five) Minutes As Needed. 1 under the tongue as needed for angina, may repeat q5mins for up three doses) 25 tablet 3   • warfarin (COUMADIN) 3 MG tablet Take 5 mg by mouth Daily. Last dose 9-12-20 per   rGace's orders     • warfarin (COUMADIN) 4 MG tablet Alternating dosages; last dose 9-12-20 per Dr. 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   • History of seizures as a child    • Hypertension    • Hypothyroidism    • Pneumonia    • Seizures (CMS/HCC)     History of childhood seizures.   • Tobacco abuse     Ongoing   • Type 2 diabetes mellitus (CMS/HCC)      PSH:    Past Surgical History:   Procedure Laterality Date   • CARDIAC CATHETERIZATION  11/2004    revealed an occluded saphenous vein graft to the RCA. There was an 80% left main with an occluded LAD with a patent LIMA graft to the LAD. There was also an occluded OM1 with a patent saphenous vein graft to the OM.   • CARDIAC CATHETERIZATION  07/30/2013    with 100% RCA. Occluded SVG. With 100% LAD. Patent LINDSEY with distal LAD occlusion. Widely patent SVG to ramus and OM-1, filling other natives of the collaterals with normal LVEF.   • CARDIAC CATHETERIZATION N/A 5/4/2018    Procedure: Left Heart Cath;  Surgeon: Keegan Aguilera MD;  Location: [facility] INVASIVE LOCATION;  Service: Cardiovascular   • CARPAL TUNNEL RELEASE     • COLONOSCOPY  2018   • CORONARY ANGIOPLASTY WITH STENT PLACEMENT     • CORONARY ARTERY BYPASS GRAFT  2004    x3   • CYST REMOVAL Left     wrist x2   • KIDNEY SURGERY  2018   • POLYPECTOMY         Immunization History:  Influenza: yes  Pneumococcal: yes  Tetanus: unknown    Social History:   Tobacco:   Social History     Tobacco Use   Smoking Status Current Every Day Smoker   • Packs/day: 1.00   •  Years: 54.00   • Pack years: 54.00   • Types: Cigarettes   Smokeless Tobacco Never Used   Tobacco Comment    smoked about 54 years.       Alcohol:     Social History     Substance and Sexual Activity   Alcohol Use No       Vitals:           /88 (BP Location: Right arm, Patient Position: Lying) Comment: left arm= 152/87  Pulse 76   Temp 97.3 °F (36.3 °C) (Temporal)   Resp 16   SpO2 97%     Physical Exam:  General Appearance:    Alert, cooperative, no distress, appears stated age, obese   Head:    Normocephalic, without obvious abnormality, atraumatic   Lungs:     Clear to auscultation bilaterally, respirations unlabored    Heart:   Regular rate and rhythm, S1 and S2 normal, no murmur, rub    or gallop    Abdomen:    Soft, nontender.  +bowel sounds   Breast Exam:    deferred   Genitalia:    deferred   Extremities:   Extremities normal, atraumatic, no cyanosis or edema   Skin:   Skin color, texture, turgor normal, no rashes or lesions   Neurologic:   Grossly intact   Results Review  LABS:  Lab Results   Component Value Date    WBC 6.73 09/15/2020    HGB 15.5 09/15/2020    HCT 49.0 09/15/2020    MCV 88.0 09/15/2020     09/15/2020    NEUTROABS 4.17 09/15/2020    GLUCOSE 132 (H) 05/04/2018    BUN 20 05/04/2018    CREATININE 1.10 05/04/2018    EGFRIFNONA 66 05/04/2018     05/04/2018    K 3.8 05/04/2018     05/04/2018    CO2 30.0 05/04/2018    CALCIUM 9.1 05/04/2018    ALBUMIN 4.20 05/04/2018    AST 17 05/04/2018    ALT 16 05/04/2018    BILITOT 0.4 05/04/2018    PTT 33.6 09/15/2020    INR 1.14 09/17/2020       RADIOLOGY:  Imaging Results (Last 72 Hours)     ** No results found for the last 72 hours. **          I reviewed the patient's new clinical results.  CBC, EKG on chart.  CMP, PT/INR pending    Cancer Staging (if applicable)  Cancer Patient: __ yes _x_no __unknown; If yes, clinical stage T:__ N:__M:__, stage group or __N/A    Impression: Aortic anuerysm    Plan: For abdominal aortic  aneurysm repair with endograft today    MARVIN Mills   9/17/2020   11:08 EDT

## 2020-09-17 NOTE — ANESTHESIA POSTPROCEDURE EVALUATION
Patient: Cal Frausto    Procedure Summary     Date: 09/17/20 Room / Location:  [facility] /  [facility]    Anesthesia Start: 1346 Anesthesia Stop:     Procedure: ABDOMINAL AORTIC ANEURYSM REPAIR WITH ENDOGRAFT (N/A Abdomen) Diagnosis:       Abdominal aortic aneurysm (AAA) without rupture (CMS/HCC)      (Abdominal aortic aneurysm (AAA) without rupture (CMS/HCC) [I71.4])    Surgeon: Flynn Edwards MD Provider: Kei Licona MD    Anesthesia Type: general ASA Status: 3          Anesthesia Type: general    Vitals  Vitals Value Taken Time   BP 99/62 09/17/20 1545   Temp 97.1 °F (36.2 °C) 09/17/20 1520   Pulse 65 09/17/20 1554   Resp 16 09/17/20 1540   SpO2 95 % 09/17/20 1554   Vitals shown include unvalidated device data.        Post Anesthesia Care and Evaluation    Patient location during evaluation: PACU  Patient participation: complete - patient cannot participate  Level of consciousness: confused  Pain score: 0  Pain management: adequate  Airway patency: patent  Anesthetic complications: No anesthetic complications  PONV Status: none  Cardiovascular status: stable  Respiratory status: acceptable, nasal cannula and spontaneous ventilation  Hydration status: stable    Comments: Pt transferred to PACU with O2. Vital signs stable. Report to PACU RN and care accepted.

## 2020-09-17 NOTE — ANESTHESIA PROCEDURE NOTES
Right Radial Arterial Line      Patient reassessed immediately prior to procedure    Patient location during procedure: pre-op   Line placed for hemodynamic monitoring.  Performed By   CRNA: Lea Appiah CRNA  Preanesthetic Checklist  Completed: patient identified, site marked, surgical consent, pre-op evaluation, timeout performed, IV checked, risks and benefits discussed and monitors and equipment checked  Arterial Line Prep   Sterile Tech: cap, gloves and sterile barriers  Prep: ChloraPrep  Patient monitoring: blood pressure monitoring, continuous pulse oximetry and EKG  Arterial Line Procedure   Laterality:right  Location:  radial artery  Catheter size: 20 G   Guidance: palpation technique  Number of attempts: 1  Successful placement: yes  Post Assessment   Dressing Type: line sutured, occlusive dressing applied, secured with tape and wrist guard applied.   Complications no  Circ/Move/Sens Assessment: normal and unchanged.   Patient Tolerance: patient tolerated the procedure well with no apparent complications

## 2020-09-17 NOTE — ANESTHESIA PREPROCEDURE EVALUATION
Anesthesia Evaluation     Patient summary reviewed and Nursing notes reviewed                Airway   Mallampati: I  TM distance: >3 FB  Neck ROM: full  No difficulty expected  Dental - normal exam     Pulmonary - normal exam   (+) pneumonia , a smoker Current, COPD,   Cardiovascular - normal exam    (+) hypertension, CAD, CABG, cardiac stents DVT,       Neuro/Psych  (+) seizures, psychiatric history,     GI/Hepatic/Renal/Endo    (+)   diabetes mellitus,     Musculoskeletal     Abdominal  - normal exam    Bowel sounds: normal.   Substance History   (+) alcohol use,      OB/GYN negative ob/gyn ROS         Other   arthritis,    history of cancer (URETERAL)                  Anesthesia Plan    ASA 3     general   (MOHSEN)  intravenous induction     Anesthetic plan, all risks, benefits, and alternatives have been provided, discussed and informed consent has been obtained with: patient.    Plan discussed with CRNA.

## 2020-09-17 NOTE — ADDENDUM NOTE
Addendum  created 09/17/20 1610 by Lea Appiah CRNA    Visit Navigator Flowsheet section accepted

## 2020-09-18 VITALS
TEMPERATURE: 98.1 F | OXYGEN SATURATION: 93 % | WEIGHT: 195.77 LBS | SYSTOLIC BLOOD PRESSURE: 123 MMHG | BODY MASS INDEX: 30.73 KG/M2 | HEIGHT: 67 IN | RESPIRATION RATE: 22 BRPM | DIASTOLIC BLOOD PRESSURE: 68 MMHG | HEART RATE: 62 BPM

## 2020-09-18 LAB
ANION GAP SERPL CALCULATED.3IONS-SCNC: 13 MMOL/L (ref 5–15)
BUN SERPL-MCNC: 25 MG/DL (ref 8–23)
BUN/CREAT SERPL: 15.9 (ref 7–25)
CALCIUM SPEC-SCNC: 8.3 MG/DL (ref 8.6–10.5)
CHLORIDE SERPL-SCNC: 99 MMOL/L (ref 98–107)
CO2 SERPL-SCNC: 22 MMOL/L (ref 22–29)
CREAT SERPL-MCNC: 1.57 MG/DL (ref 0.76–1.27)
DEPRECATED RDW RBC AUTO: 50.3 FL (ref 37–54)
ERYTHROCYTE [DISTWIDTH] IN BLOOD BY AUTOMATED COUNT: 15.8 % (ref 12.3–15.4)
GFR SERPL CREATININE-BSD FRML MDRD: 44 ML/MIN/1.73
GLUCOSE BLDC GLUCOMTR-MCNC: 232 MG/DL (ref 70–130)
GLUCOSE BLDC GLUCOMTR-MCNC: 320 MG/DL (ref 70–130)
GLUCOSE SERPL-MCNC: 221 MG/DL (ref 65–99)
HCT VFR BLD AUTO: 46.5 % (ref 37.5–51)
HGB BLD-MCNC: 14.6 G/DL (ref 13–17.7)
MCH RBC QN AUTO: 27.7 PG (ref 26.6–33)
MCHC RBC AUTO-ENTMCNC: 31.4 G/DL (ref 31.5–35.7)
MCV RBC AUTO: 88.1 FL (ref 79–97)
PLATELET # BLD AUTO: 175 10*3/MM3 (ref 140–450)
PMV BLD AUTO: 10.3 FL (ref 6–12)
POTASSIUM SERPL-SCNC: 4.7 MMOL/L (ref 3.5–5.2)
RBC # BLD AUTO: 5.28 10*6/MM3 (ref 4.14–5.8)
SODIUM SERPL-SCNC: 134 MMOL/L (ref 136–145)
WBC # BLD AUTO: 8.56 10*3/MM3 (ref 3.4–10.8)

## 2020-09-18 PROCEDURE — 85027 COMPLETE CBC AUTOMATED: CPT | Performed by: PHYSICIAN ASSISTANT

## 2020-09-18 PROCEDURE — 25010000002 HYDRALAZINE PER 20 MG: Performed by: NURSE PRACTITIONER

## 2020-09-18 PROCEDURE — 82962 GLUCOSE BLOOD TEST: CPT

## 2020-09-18 PROCEDURE — 25010000003 CEFUROXIME SODIUM 1.5 G RECONSTITUTED SOLUTION: Performed by: PHYSICIAN ASSISTANT

## 2020-09-18 PROCEDURE — G0108 DIAB MANAGE TRN  PER INDIV: HCPCS

## 2020-09-18 PROCEDURE — 80048 BASIC METABOLIC PNL TOTAL CA: CPT | Performed by: PHYSICIAN ASSISTANT

## 2020-09-18 PROCEDURE — 63710000001 INSULIN LISPRO (HUMAN) PER 5 UNITS: Performed by: INTERNAL MEDICINE

## 2020-09-18 RX ORDER — HYDRALAZINE HYDROCHLORIDE 20 MG/ML
5 INJECTION INTRAMUSCULAR; INTRAVENOUS ONCE
Status: COMPLETED | OUTPATIENT
Start: 2020-09-18 | End: 2020-09-18

## 2020-09-18 RX ORDER — HYDROCODONE BITARTRATE AND ACETAMINOPHEN 7.5; 325 MG/1; MG/1
1 TABLET ORAL EVERY 6 HOURS PRN
Qty: 10 TABLET | Refills: 0 | Status: SHIPPED | OUTPATIENT
Start: 2020-09-18 | End: 2020-09-18 | Stop reason: HOSPADM

## 2020-09-18 RX ORDER — CLOPIDOGREL BISULFATE 75 MG/1
75 TABLET ORAL DAILY
Qty: 30 TABLET | Refills: 2 | Status: SHIPPED | OUTPATIENT
Start: 2020-09-18 | End: 2020-09-18 | Stop reason: HOSPADM

## 2020-09-18 RX ADMIN — LOSARTAN POTASSIUM: 50 TABLET, FILM COATED ORAL at 08:26

## 2020-09-18 RX ADMIN — ISOSORBIDE MONONITRATE 60 MG: 60 TABLET, EXTENDED RELEASE ORAL at 08:27

## 2020-09-18 RX ADMIN — THYROID, PORCINE 15 MG: 30 TABLET ORAL at 05:05

## 2020-09-18 RX ADMIN — ASPIRIN 81 MG: 81 TABLET, COATED ORAL at 08:27

## 2020-09-18 RX ADMIN — LEVOTHYROXINE SODIUM 112 MCG: 112 TABLET ORAL at 05:05

## 2020-09-18 RX ADMIN — INSULIN LISPRO 7 UNITS: 100 INJECTION, SOLUTION INTRAVENOUS; SUBCUTANEOUS at 11:07

## 2020-09-18 RX ADMIN — CLOPIDOGREL BISULFATE 75 MG: 75 TABLET ORAL at 08:28

## 2020-09-18 RX ADMIN — INSULIN LISPRO 4 UNITS: 100 INJECTION, SOLUTION INTRAVENOUS; SUBCUTANEOUS at 08:26

## 2020-09-18 RX ADMIN — CEFUROXIME SODIUM 1.5 G: 1.5 INJECTION, POWDER, FOR SOLUTION INTRAVENOUS at 05:04

## 2020-09-18 RX ADMIN — HYDRALAZINE HYDROCHLORIDE 5 MG: 20 INJECTION INTRAMUSCULAR; INTRAVENOUS at 05:25

## 2020-09-18 NOTE — PROGRESS NOTES
"Adult Nutrition  Assessment/PES    Patient Name:  Cal Frausto  YOB: 1945  MRN: [id number]  Admit Date:  9/17/2020    Assessment Date:  9/18/2020    Comments:      Reason for Assessment     Row Name 09/18/20 1105          Reason for Assessment    Reason For Assessment  physician consult;other (see comments) EDUCATION/ MDR 30\"         Nutrition/Diet History     Row Name 09/18/20 1105          Nutrition/Diet History    Typical Food/Fluid Intake  MD REPORTS ORDERS TO DISCHARGE TO HOME BUT NEEDS TO TALK WITH RD & DIABETES NURSE EDUCATOR BEFORE LEAVING. PT REPORTS UNDERSTANDING OF LITERATURE REVIEWED & REPORTS PLANS ON READING THE LITERATURE PROVIDED.     Food Preferences  -- PT REPORTS IF HE EVER HAD MNT EDUCATION RE: DIABETES HE HAS      Meal/Snack Patterns  -- FORGOTTEN IT.          Anthropometrics     Row Name 09/18/20 1107          Anthropometrics    Height  168.9 cm (66.5\")        Admit Weight    Admit Weight Method  other (see comments) WT METHOD NOT SPECIFIED     Admit Weight  90.1 kg (198 lb 9.6 oz)        Ideal Body Weight (IBW)    Ideal Body Weight (IBW) (kg)  66.7         Labs/Tests/Procedures/Meds     Row Name 09/18/20 1107          Labs/Procedures/Meds    Lab Results Reviewed  reviewed     Lab Results Comments  A1C 11.2        Medications    Pertinent Medications Reviewed  reviewed           Estimated/Assessed Needs     Row Name 09/18/20 1107          Calculation Measurements    Height  168.9 cm (66.5\")                   Problem/Interventions:  Problem 1     Row Name 09/18/20 1108          Nutrition Diagnoses Problem 1    Problem 1  Knowledge Deficit     Etiology (related to)  MNT for Treatment/Condition     Signs/Symptoms (evidenced by)  Potential Information Deficit               Intervention Goal     Row Name 09/18/20 1108          Intervention Goal    General  Provide information regarding MNT for treatment/condition             Education/Evaluation     Row Name 09/18/20 1108          " Education    Education  Provided education regarding     Provided education regarding  Diet rationale;Healthy eating for diabetes;Key food habit change PROVIDED HEALTHY HEART NUTRITION GUIDEBOOK WHICH INCLUDES CHO COUNTING (PT GIVEN 5 CH/MEAL PLAN)           Electronically signed by:  Sylwia Almendarez RD  09/18/20 11:10 EDT

## 2020-09-18 NOTE — PROGRESS NOTES
Received call from Holger in 2H ICU stating patient needed a glucometer and diabetes education.  A1C was 11.  States has forgotten anything he knew about diabetes.  Educators are in an education program and unavailable for consult prior to discharge.  Left voice mail message for Bee with Diabetes education requesting one of the diabetes educators follow up with this man on Monday to assist him with diabetes education resources and a glucometer for self monitoring.

## 2020-09-18 NOTE — PROGRESS NOTES
Discharge Planning Assessment  [facility]     Patient Name: Cal Frausto  MRN: [id number]  Today's Date: 9/18/2020    Admit Date: 9/17/2020    Discharge Needs Assessment     Row Name 09/18/20 1109       Living Environment    Lives With  alone    Current Living Arrangements  home/apartment/condo    Primary Care Provided by  self    Provides Primary Care For  no one    Family Caregiver if Needed  child(reed), adult    Family Caregiver Names  Daughter, Elizabeth Faulkner    Quality of Family Relationships  supportive;involved;helpful    Able to Return to Prior Arrangements  yes    Living Arrangement Comments  Lives alone in [place] in split level home with 11 steps up and 11 down.  States uses steps without difficulty       Transition Planning    Patient/Family Anticipates Transition to  home    Patient/Family Anticipated Services at Transition  none    Transportation Anticipated  family or friend will provide       Discharge Needs Assessment    Readmission Within the Last 30 Days  no previous admission in last 30 days    Equipment Currently Used at Home  oxygen Home O2 at 2L/NC at night, supplied by [employer]    Concerns to be Addressed  discharge planning;denies needs/concerns at this time    Anticipated Changes Related to Illness  none    Equipment Needed After Discharge  none    Provided Post Acute Provider List?  N/A    N/A Provider List Comment  No post discharge services needed        Discharge Plan     Row Name 09/18/20 1111       Plan    Plan  Home    Patient/Family in Agreement with Plan  yes    Plan Comments  Met with patient in the room for initial discharge planning.  Lives alone in [place] in split level home with 11 steps up and 11 down.  [facility] [place] uses steps without difficulty.  Independent.  PCP is Harsha Smith.  He has home O2 he uses at 2L/NC at night supplied by [employer].  Plan is home today.  Denies discharge needs.  Following    Final Discharge Disposition Code  01 - home or  self-care        Continued Care and Services - Admitted Since 9/17/2020    Coordination has not been started for this encounter.       Expected Discharge Date and Time     Expected Discharge Date Expected Discharge Time    Sep 18, 2020         Demographic Summary     Row Name 09/18/20 1108       General Information    Admission Type  inpatient    Arrived From  operating room    Referral Source  admission list    Reason for Consult  discharge planning    Preferred Language  English        Functional Status     Row Name 09/18/20 1109       Functional Status    Usual Activity Tolerance  good    Current Activity Tolerance  good       Functional Status, IADL    Medications  independent    Meal Preparation  independent    Housekeeping  independent    Laundry  independent    Shopping  independent        Psychosocial    No documentation.       Abuse/Neglect    No documentation.       Legal    No documentation.       Substance Abuse    No documentation.       Patient Forms    No documentation.           Tish Price RN

## 2020-09-18 NOTE — PLAN OF CARE
Goal Outcome Evaluation:  Plan of Care Reviewed With: patient     Outcome Summary: VSS, Pt AOx4, up in chair with minimal c/o groin tenderness. Pt refuses any pain medication and states it is manageable. On RA throughout night with sats >94%. Pt currently on IVF. No other concerns at this time-- will continue to monitor,

## 2020-09-18 NOTE — PROGRESS NOTES
CTS Progress Note      POD 1 s/p aortogram, endovascular repair of abdominal aortic aneurysm      Subjective  Up in chair about to have breakfast.  Pleasant.  No complaints      Objective    Physical Exam:   Vital Signs   Temp:  [97.1 °F (36.2 °C)-98 °F (36.7 °C)] 98 °F (36.7 °C)  Heart Rate:  [41-76] 50  Resp:  [15-20] 18  BP: ()/() 127/59  Arterial Line BP: ()/() 95/63   GEN: NAD   CV: Regular rate and rhythm, distant    RESP: Clear to auscultation bilaterally   ABD: Soft, nontender   EXT: Warm to the touch trace, bilateral edema lower extremities   Incision: Surgical insertion sites are soft with no surrounding erythema or drainage no sign of hematoma     Results     Results from last 7 days   Lab Units 09/18/20  0301   WBC 10*3/mm3 8.56   HEMOGLOBIN g/dL 14.6   HEMATOCRIT % 46.5   PLATELETS 10*3/mm3 175     Results from last 7 days   Lab Units 09/18/20  0301   SODIUM mmol/L 134*   POTASSIUM mmol/L 4.7   CHLORIDE mmol/L 99   CO2 mmol/L 22.0   BUN mg/dL 25*   CREATININE mg/dL 1.57*   GLUCOSE mg/dL 221*   CALCIUM mg/dL 8.3*     Results from last 7 days   Lab Units 09/17/20  1034 09/15/20  1125   INR  1.14 1.69*   APTT seconds  --  33.6           Assessment/Plan   Postoperative day 1 status post endovascular repair abdominal aortic aneurysm    Abdominal aortic aneurysm without rupture (6.4 cm) (CMS/[facility])    CAD involving coronary bypass graft of native heart. CABG 2004, stent October 2019    Essential hypertension    Tobacco abuse    Dyslipidemia    Type 2 diabetes mellitus. 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 Held 2 days prior to surgery    Hx of renal cell cancer. Left nephrectomy 10/2019        Plan   Ambulate  Plan for discharge home today if ambulating well  Follow-up in 4  weeks    09/18/20  07:21 EDT

## 2020-09-18 NOTE — CONSULTS
"Diabetes Education    Patient Name:  Cal Frausto  YOB: 1945  MRN: [id number]  Admit Date:  9/17/2020      Consult for diabetes education. Received call from YARA Wren stating pt is going home. Initially spoke with pt over the phone, pt states he does not have a glucose meter and has never checked blood sugars at home. He states he has had diabetes for a \"few years\" and takes only januvia. States he sees his PCP Dr. Smith often-states he will be following up with Dr. Smith on Monday for INR check. Discussed and taught Mr. Frausto about type 2 diabetes self-management, risk factors, and importance of blood glucose control to reduce complications. Target blood glucose readings and A1c goals per ADA were reviewed. Reviewed with patient current A1c 11.2 and discussed its significance and increased risk for developing further complications. Signs, symptoms, and treatment of hyperglycemia and hypoglycemia were discussed. Discussed importance of physical activity and how it helps with blood sugar management as well as many other health benefits. Talked about when to check blood sugars at home, suggested 1-2 times/day and alternating times to include fasting and 2 hours after meals; asked him to start writing down all blood sugars that he checks at home and take to each visit with PCP. Wrote down his current A1c and requested he take with him to his PCP visit on Monday and discuss blood sugar management; explained that he may need additional medication for optimal management.   Updated YRAA Wren-requested she ask for discharging provider to write prescription for blood glucose meter and testing supplies so that pt can start checking blood sugars at home.   12:00pm- Was able to visit pt at bedside. Reviewed all info discussed over the phone. Provided with pt with a donated [employer] Guide Me meter, instructed on care and use and of meter, strips, lancing device, and lancets. He was able to load lancing " "device with lancet drum, used teach back method to correctly state process of sticking his finger, was able to demonstrate loading strip into meter. He agrees that Diabetes Education staff can reach out to his PCP and request referral for outpatient diabetes ed. Instructed him to take meter and all supplies with him to PCP visit on Monday. Provided patient with copy of [facility]'s \"What is Diabetes\" handout, \"Blood Glucose Goals\" handout, and \"What is A1c\" handout.   Will call in 1 week for follow up, and diabetes ed staff will reach out to PCP for referral for outpatient ed. Thank you for the consult!      Electronically signed by:  Corrine Serrato RN  09/18/20 12:29 EDT  "

## 2020-09-18 NOTE — DISCHARGE INSTR - APPOINTMENTS
Monday Sept. 21 11:40 AM                                                                                                                    [address]    3-day follow up INR check                                                                                                                            [facility]                                                                                                                      [facility] [id number]

## 2020-09-18 NOTE — NURSING NOTE
Pt educated via telephone by diabetes nurse, Corrine Serrato. Discussed Pt need for glucometer and lancets. Diabetes education to schedule follow up education.

## 2020-09-19 ENCOUNTER — READMISSION MANAGEMENT (OUTPATIENT)
Dept: CALL CENTER | Facility: HOSPITAL | Age: 75
End: 2020-09-19

## 2020-09-19 LAB
BH BB BLOOD EXPIRATION DATE: NORMAL
BH BB BLOOD EXPIRATION DATE: NORMAL
BH BB BLOOD TYPE BARCODE: 7300
BH BB BLOOD TYPE BARCODE: 7300
BH BB DISPENSE STATUS: NORMAL
BH BB DISPENSE STATUS: NORMAL
BH BB PRODUCT CODE: NORMAL
BH BB PRODUCT CODE: NORMAL
BH BB UNIT NUMBER: NORMAL
BH BB UNIT NUMBER: NORMAL
CROSSMATCH INTERPRETATION: NORMAL
CROSSMATCH INTERPRETATION: NORMAL
UNIT  ABO: NORMAL
UNIT  ABO: NORMAL
UNIT  RH: NORMAL
UNIT  RH: NORMAL

## 2020-09-19 NOTE — OUTREACH NOTE
Prep Survey      Responses   [facility] facility patient discharged from?  [facility]   Is LACE score < 7 ?  No   Eligibility  Readm Mgmt   Discharge diagnosis  Endovascular repair of abdominal aortic aneurysm   COVID-19 Test Status  Negative   Does the patient have one of the following disease processes/diagnoses(primary or secondary)?  Cardiothoracic surgery   Does the patient have Home health ordered?  No   Is there a DME ordered?  No   Prep survey completed?  Yes          Mayela Keen RN

## 2020-09-21 ENCOUNTER — READMISSION MANAGEMENT (OUTPATIENT)
Dept: CALL CENTER | Facility: HOSPITAL | Age: 75
End: 2020-09-21

## 2020-09-21 NOTE — DISCHARGE SUMMARY
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   • History of seizures as a child    • Hypertension    • Hypothyroidism    • Pneumonia    • Seizures (CMS/[facility])     History of childhood seizures.   • Tobacco abuse     Ongoing   • Type 2 diabetes mellitus (CMS/HCC)          Abdominal aortic aneurysm without rupture (6.4 cm) (CMS/[facility])    CAD involving coronary bypass graft of native heart. CABG 2004, stent October 2019    Essential hypertension    Tobacco abuse    Dyslipidemia    Type 2 diabetes mellitus. 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 Held 2 days prior to surgery    Hx of renal cell cancer. Left nephrectomy 10/2019      History of Present Illness  74-year-old  male with a history of hypertension, hyperlipidemia, diabetes mellitus, coronary artery disease status post CABG, renal cancer, COPD and active tobacco abuse who presents with abdominal aortic aneurysm.  The patient denies abdominal or back pain.  Routine screening CT scan demonstrated enlargement of his known abdominal aortic aneurysm up to 6.4 cm.    Hospital Course  The patient is a 74-year-old male who was admitted on 9/17/2020 and  underwent an endovascular abdominal aortic aneurysm repair via percutaneous approach performed by Dr. Flynn Edwards.  The patient tolerated the procedure well without any immediate complications and was transferred to the intensive care unit in stable condition.  On postoperative day #1, the patient was doing well having previously been extubated.  The patient was hemodynamically stable and ambulating independently.  Patient may criteria for discharge and was discharged home without difficulty.  Patient was restarted on his home dose of Coumadin which apparently was prescribed for a history of a DVT.  The patient has been instructed to follow-up within 2 to 3 days for an INR check and to follow-up with the prescribing provider to determine the necessity of continuing with anticoagulation.    Procedures Performed  Procedure(s):  ABDOMINAL AORTIC ANEURYSM REPAIR WITH ENDOGRAFT       Consults:   Intensivist    Discharge Medications     Discharge Medications      Changes to Medications      Instructions Start Date   nitroglycerin 0.4 MG SL tablet  Commonly known as: NITROSTAT  What changed:   · how much to take  · how to take this  · when to take this  · reasons to take this   1 under the tongue as needed for angina, may repeat q5mins for up three doses         Continue These Medications      Instructions Start Date   aspirin 81 MG tablet   81 mg, Oral, Daily      atenolol 50 MG tablet  Commonly known as: TENORMIN   50 mg, Oral, Daily      fenofibrate 160 MG tablet   160 mg, Oral, Daily      isosorbide mononitrate 60 MG 24 hr tablet  Commonly known as: IMDUR   60 mg, Oral, Daily      Januvia 100 MG tablet  Generic drug: SITagliptin   100 mg, Oral, Daily      levothyroxine 112 MCG tablet  Commonly known as: SYNTHROID, LEVOTHROID   112 mcg, Oral, Daily      losartan-hydrochlorothiazide 100-25 MG per tablet  Commonly known as: HYZAAR   0.5 tablets, Oral, Daily      O2  Commonly known as: OXYGEN   2 L/min, Inhalation,  Nightly      rosuvastatin 40 MG tablet  Commonly known as: CRESTOR   40 mg, Oral, Nightly      thyroid 15 MG tablet  Commonly known as: ARMOUR   15 mg, Oral, Daily      warfarin 4 MG tablet  Commonly known as: COUMADIN   Alternating dosages; last dose 9-12-20 per Dr. Edwards's orders      warfarin 3 MG tablet  Commonly known as: COUMADIN   5 mg, Oral, Daily Warfarin, Last dose 9-12-20 per Dr. Edwards's orders             Discharge Diet:   Diet Instructions     Diet: Cardiac, Consistent Carbohydrate      Discharge Diet:  Cardiac  Consistent Carbohydrate           Heart Healthy and Diabetic Diet                  Activity at Discharge:   Activity Instructions     Activity as Tolerated      Other Activity Instructions      Activity Instructions: No lifting greater than 10 pounds for 2 weeks and no driving for 2 weeks or while taking narcotics.        Do not drive while taking narcotics    Follow-up Appointments  Future Appointments   Date Time Provider Department Center   10/20/2020 12:45 PM Beena Kurtz APRN [facility] None   11/9/2020 10:30 AM Keira Langley APRN [facility] None          MARVIN Ritter  09/21/20  10:58 EDT

## 2020-09-21 NOTE — OUTREACH NOTE
CT Surgery Week 1 Survey      Responses   [facility] patient discharged from?  [facility]   Does the patient have one of the following disease processes/diagnoses(primary or secondary)?  Cardiothoracic surgery   Is there a successful TCM telephone encounter documented?  No   Week 1 attempt successful?  Yes   Call start time  1203   Call end time  1208   Discharge diagnosis  Endovascular repair of abdominal aortic aneurysm   Is patient permission given to speak with other caregiver?  Yes   List who call center can speak with  Elizabeth Jiang reviewed with patient/caregiver?  Yes   Is the patient having any side effects they believe may be caused by any medication additions or changes?  No   Does the patient have all medications related to this admission filled (includes all antibiotics, pain medications, cardiac medications, etc.)  Yes   Is the patient taking all medications as directed (includes completed medication regime)?  Yes   Does the patient have a primary care provider?   Yes   Does the patient have an appointment scheduled with their C/T surgeon?  Yes   Has the patient kept scheduled appointments due by today?  N/A   Has home health visited the patient within 72 hours of discharge?  N/A   Psychosocial issues?  No   Did the patient receive a copy of their discharge instructions?  Yes   Nursing interventions  Reviewed instructions with patient   What is the patient's perception of their health status since discharge?  Improving   Is the patient/caregiver able to teach back normal signs of recovery?  Nausea and lack of appetite   Nursing interventions  Reassured on normal signs of recovery   Is the patient /caregiver able to teach back basic post-op care?  Continue use of incentive spirometry at least 1 week post discharge, Practice 'cough and deep breath', Drive as instructed by MD in discharge instructions, Take showers only when approved by MD-sponge bathe until then, No tub bath, swimming, or  hot tub until instructed by MD, Keep incision areas clean, dry and protected, Do not remove steri-strips, Lifting as instructed by MD in discharge instructions   Is the patient/caregiver able to teach back signs and symptoms of incisional infection?  Increased redness, swelling or pain at the incisonal site, Increased drainage or bleeding, Incisional warmth, Pus or odor from incision, Fever   Is the patient/caregiver able to teach back steps to recovery at home?  Set small, achievable goals for return to baseline health, Rest and rebuild strength, gradually increase activity, Weigh daily, Practice good oral hygiene, Eat a well-balance diet, Make a list of questions for surgeon's appointment   Is the patient /caregiver able to teach back the importance of cardiac rehab?  Yes   Is the patient/caregiver able to teach back the hierarchy of who to call/visit for symptoms/problems? PCP, Specialist, Home health nurse, Urgent Care, ED, 911  Yes   Week 1 call completed?  Yes   Wrap up additional comments  Patient is doing well.  Site is not bleeding or looking infected.  Afebrile.  Office has not called yet to set up appt.  Told him if they do not call by tomorrow, to reach out to them.              Daisy Jose, RN

## 2020-09-24 ENCOUNTER — TRANSCRIBE ORDERS (OUTPATIENT)
Dept: DIABETES SERVICES | Facility: HOSPITAL | Age: 75
End: 2020-09-24

## 2020-09-24 DIAGNOSIS — IMO0002 UNCONTROLLED DIABETES WITH KIDNEY COMPLICATIONS: Primary | ICD-10-CM

## 2020-09-30 ENCOUNTER — READMISSION MANAGEMENT (OUTPATIENT)
Dept: CALL CENTER | Facility: HOSPITAL | Age: 75
End: 2020-09-30

## 2020-09-30 NOTE — OUTREACH NOTE
CT Surgery Week 2 Survey      Responses   [facility] patient discharged from?  [facility]   Does the patient have one of the following disease processes/diagnoses(primary or secondary)?  Cardiothoracic surgery   Week 2 attempt successful?  Yes   Call start time  1242   Call end time  1245   Discharge diagnosis  Endovascular repair of abdominal aortic aneurysm   Is patient permission given to speak with other caregiver?  Yes   Meds reviewed with patient/caregiver?  Yes   Is the patient having any side effects they believe may be caused by any medication additions or changes?  No   Does the patient have all medications related to this admission filled (includes all antibiotics, pain medications, cardiac medications, etc.)  Yes   Is the patient taking all medications as directed (includes completed medication regime)?  Yes   Does the patient have a primary care provider?   Yes   Does the patient have an appointment scheduled with their C/T surgeon?  Yes   Has the patient kept scheduled appointments due by today?  Yes   Has home health visited the patient within 72 hours of discharge?  N/A   Psychosocial issues?  No   Did the patient receive a copy of their discharge instructions?  Yes   Nursing interventions  Reviewed instructions with patient   What is the patient's perception of their health status since discharge?  Improving   Nursing interventions  Nurse provided patient education   Is the patient/caregiver able to teach back normal signs of recovery?  Nausea and lack of appetite   Nursing interventions  Reassured on normal signs of recovery   Is the patient /caregiver able to teach back basic post-op care?  Continue use of incentive spirometry at least 1 week post discharge, Practice 'cough and deep breath', Drive as instructed by MD in discharge instructions, Lifting as instructed by MD in discharge instructions, Do not remove steri-strips, Keep incision areas clean, dry and protected   Is the  patient/caregiver able to teach back signs and symptoms of incisional infection?  Increased redness, swelling or pain at the incisonal site, Increased drainage or bleeding, Incisional warmth, Pus or odor from incision, Fever   Is the patient/caregiver able to teach back steps to recovery at home?  Set small, achievable goals for return to baseline health, Rest and rebuild strength, gradually increase activity, Eat a well-balance diet, Make a list of questions for surgeon's appointment   Is the patient /caregiver able to teach back the importance of cardiac rehab?  Yes   Nursing interventions  Provided education on importance of cardiac rehab   Is the patient/caregiver able to teach back the hierarchy of who to call/visit for symptoms/problems? PCP, Specialist, Home health nurse, Urgent Care, ED, 911  Yes   Additional teach back comments  Says his incision is still covered and has no issues.Good appetite and sleeping.    Week 2 call completed?  Yes   Wrap up additional comments  Appts scheduled and will write questions for f/u appt.          Rachana Vaca RN

## 2020-10-13 ENCOUNTER — READMISSION MANAGEMENT (OUTPATIENT)
Dept: CALL CENTER | Facility: HOSPITAL | Age: 75
End: 2020-10-13

## 2020-10-13 NOTE — OUTREACH NOTE
CT Surgery Week 3 Survey      Responses   [facility] patient discharged from?  [place] [facility]   Does the patient have one of the following disease processes/diagnoses(primary or secondary)?  Cardiothoracic surgery   Week 3 attempt successful?  Yes   Call start time  1334   Call end time  1335   Discharge diagnosis  Endovascular repair of abdominal aortic aneurysm   Meds reviewed with patient/caregiver?  Yes   Is the patient having any side effects they believe may be caused by any medication additions or changes?  No   Does the patient have all medications related to this admission filled (includes all antibiotics, pain medications, cardiac medications, etc.)  Yes   Is the patient taking all medications as directed (includes completed medication regime)?  Yes   Does the patient have a primary care provider?   Yes   Does the patient have an appointment scheduled with their C/T surgeon?  Yes   Has the patient kept scheduled appointments due by today?  Yes   Has home health visited the patient within 72 hours of discharge?  N/A   Psychosocial issues?  No   Did the patient receive a copy of their discharge instructions?  Yes   Nursing interventions  Reviewed instructions with patient   What is the patient's perception of their health status since discharge?  Improving   Nursing interventions  Nurse provided patient education   Is the patient/caregiver able to teach back normal signs of recovery?  Pain or discomfort at incisional site   Nursing interventions  Reassured on normal signs of recovery   Is the patient /caregiver able to teach back basic post-op care?  Continue use of incentive spirometry at least 1 week post discharge, Practice 'cough and deep breath', Drive as instructed by MD in discharge instructions, Lifting as instructed by MD in discharge instructions, Do not remove steri-strips, Keep incision areas clean, dry and protected   Is the patient/caregiver able to teach back signs and symptoms of incisional  infection?  Increased redness, swelling or pain at the incisonal site, Increased drainage or bleeding, Incisional warmth, Pus or odor from incision, Fever   Is the patient/caregiver able to teach back steps to recovery at home?  Set small, achievable goals for return to baseline health, Rest and rebuild strength, gradually increase activity, Eat a well-balance diet, Make a list of questions for surgeon's appointment   Is the patient/caregiver able to teach back the hierarchy of who to call/visit for symptoms/problems? PCP, Specialist, Home health nurse, Urgent Care, ED, 911  Yes   Additional teach back comments  Patient reports incision healing. No questions or concerns.    Week 3 call completed?  Yes          Freedom Vallecillo RN

## 2020-10-20 ENCOUNTER — OFFICE VISIT (OUTPATIENT)
Dept: CARDIAC SURGERY | Facility: CLINIC | Age: 75
End: 2020-10-20

## 2020-10-20 VITALS
DIASTOLIC BLOOD PRESSURE: 87 MMHG | HEIGHT: 66 IN | SYSTOLIC BLOOD PRESSURE: 160 MMHG | BODY MASS INDEX: 31.27 KG/M2 | OXYGEN SATURATION: 98 % | HEART RATE: 59 BPM | WEIGHT: 194.6 LBS | TEMPERATURE: 97.1 F

## 2020-10-20 DIAGNOSIS — I71.40 ABDOMINAL AORTIC ANEURYSM (AAA) WITHOUT RUPTURE (HCC): Primary | ICD-10-CM

## 2020-10-20 DIAGNOSIS — Z98.890 S/P AAA REPAIR: Primary | ICD-10-CM

## 2020-10-20 DIAGNOSIS — Z86.79 S/P AAA REPAIR: Primary | ICD-10-CM

## 2020-10-20 PROCEDURE — 99024 POSTOP FOLLOW-UP VISIT: CPT | Performed by: NURSE PRACTITIONER

## 2020-10-20 RX ORDER — FINASTERIDE 5 MG/1
5 TABLET, FILM COATED ORAL DAILY
COMMUNITY
Start: 2020-10-10 | End: 2022-11-21

## 2020-10-20 NOTE — PROGRESS NOTES
[facility] Cardiothoracic Surgery Office Follow Up Note     Date of Encounter: 10/20/2020     MRN Number: [id number]  Name: Cal Frausto  Phone Number: [phone]     Referred By: No ref. provider found  PCP: Harsha Smith MD    Chief Complaint:    Chief Complaint   Patient presents with   • Post-op Follow-up     Hospital follow-up s/p EVAR 9/17/20 for abdominal aortic aneurysm        History of Present Illness:    Cal Frausto is a 75 y.o. male with a history of DVT, COPD, HTN, tob use, history of renal cell cancer s/p left nephrectomy 10/2019, CAD s/p CABG/stent, uncontrolled DM2 and AAA s/p endovascular repair of AAA 9/17/20.  Pt with preop A1C of 11.20.  Was seen by dietician and DM educator prior to discharge.  Pt has not been monitoring blood sugars at home and continues to smoke.  He has baseline CP and CARMEN, unchanged since last [facility].  Follow with Dr. Aguilera with next appt on 11/9/20. Denies any back pain.        Review of Systems:  Review of Systems   Constitution: Negative for chills, decreased appetite, fever and malaise/fatigue.   Cardiovascular: Positive for chest pain and dyspnea on exertion. Negative for claudication, irregular heartbeat, leg swelling, near-syncope, orthopnea, palpitations and syncope.   Respiratory: Negative for cough, hemoptysis, shortness of breath, sputum production and wheezing.    Hematologic/Lymphatic: Negative for bleeding problem. Bruises/bleeds easily.   Skin: Negative for color change, poor wound healing and rash.   Musculoskeletal: Positive for arthritis and joint pain (knees). Negative for back pain and falls.   Gastrointestinal: Negative for abdominal pain, constipation, diarrhea, nausea and vomiting.   Neurological: Negative for focal weakness, numbness and paresthesias.   Psychiatric/Behavioral: Negative for depression. The patient does not have insomnia.        I have reviewed the review of systems as entered by my clinical support staff and have  "updated it as appropriate.       Allergies:  No Known Allergies    Medications:      Current Outpatient Medications:   •  aspirin 81 MG tablet, Take 81 mg by mouth daily., Disp: , Rfl:   •  atenolol (TENORMIN) 50 MG tablet, Take 50 mg by mouth daily., Disp: , Rfl:   •  fenofibrate 160 MG tablet, Take 160 mg by mouth daily., Disp: , Rfl:   •  finasteride (PROSCAR) 5 MG tablet, , Disp: , Rfl:   •  isosorbide mononitrate (IMDUR) 60 MG 24 hr tablet, Take 60 mg by mouth daily., Disp: , Rfl:   •  levothyroxine (SYNTHROID, LEVOTHROID) 112 MCG tablet, Take 112 mcg by mouth daily., Disp: , Rfl:   •  losartan-hydrochlorothiazide (HYZAAR) 100-25 MG per tablet, Take 0.5 tablets by mouth Daily., Disp: , Rfl:   •  nitroglycerin (NITROSTAT) 0.4 MG SL tablet, 1 under the tongue as needed for angina, may repeat q5mins for up three doses (Patient taking differently: Place 0.4 mg under the tongue Every 5 (Five) Minutes As Needed. 1 under the tongue as needed for angina, may repeat q5mins for up three doses), Disp: 25 tablet, Rfl: 3  •  O2 (OXYGEN), Inhale 2 L/min Every Night., Disp: , Rfl:   •  rosuvastatin (CRESTOR) 40 MG tablet, Take 1 tablet by mouth Every Night., Disp: 90 tablet, Rfl: 3  •  SITagliptin (Januvia) 100 MG tablet, Take 100 mg by mouth Daily., Disp: , Rfl:   •  thyroid (ARMOUR) 15 MG tablet, Take 15 mg by mouth daily., Disp: , Rfl:   •  warfarin (COUMADIN) 3 MG tablet, Take 5 mg by mouth Daily. Last dose 9-12-20 per Dr. Edwards's orders, Disp: , Rfl:   •  warfarin (COUMADIN) 4 MG tablet, Alternating dosages; last dose 9-12-20 per Dr. Edwards's orders, Disp: , Rfl:     Physical Exam:  Vital Signs:    Vitals:    10/20/20 1221   BP: 160/87   BP Location: Right arm   Patient Position: Sitting   Pulse: 59   Temp: 97.1 °F (36.2 °C)   SpO2: 98%   Weight: 88.3 kg (194 lb 9.6 oz)   Height: 167.6 cm (66\")     Body mass index is 31.41 kg/m².     Physical Exam  Vitals signs and nursing note reviewed.   Constitutional:       " Appearance: Normal appearance. He is well-developed and well-groomed.   HENT:      Head: Normocephalic and atraumatic.   Neck:      Musculoskeletal: Neck supple.   Cardiovascular:      Rate and Rhythm: Normal rate and regular rhythm.      Pulses:           Dorsalis pedis pulses are 1+ on the right side and 1+ on the left side.        Posterior tibial pulses are 1+ on the right side and 1+ on the left side.      Heart sounds: Normal heart sounds, S1 normal and S2 normal. No murmur. No friction rub.   Pulmonary:      Comments: Unlabored, Coarse to auscultation bilaterally  Abdominal:      General: Bowel sounds are normal.      Palpations: Abdomen is soft.      Tenderness: There is no abdominal tenderness.   Musculoskeletal:      Right lower leg: No edema.      Left lower leg: No edema.   Skin:     General: Skin is warm and dry.      Comments: Bilateral femoral puncture sites intact.  No surrounding erythema, edema or induration   Neurological:      Mental Status: He is alert and oriented to person, place, and time.   Psychiatric:         Attention and Perception: Attention normal.         Mood and Affect: Mood normal.         Speech: Speech normal.         Behavior: Behavior is cooperative.         Labs/Imaging:    No radiology results for the last day      Assessment / Plan:  Cal Frausto is a 75 y.o. male with a history of DVT, COPD, HTN, ongoing tob use, history of renal cell cancer s/p left nephrectomy 10/2019, CAD s/p CABG/stent, uncontrolled DM2 and AAA s/p endovascular repair of AAA 9/17/20. Pt stable from post operative standpoint.  Uncontrolled DM2 and has not been checking blood sugars.  Encouraged daily blood sugar monitoring especially in setting of his multiple co morbidities.  Declines cessation of tobacco.  Pt with baseline CP and distal LAD disease past LIMA graft.  Plans for follow up with [place]/Dr. Aguilera early Nov 2020.  Will obtain post op CT scan abd/pelvis.  If AAA/endograft stable, will see pt  back in 1 year with repeat CT scan abd/pelvis.    Beena Kurtz, [facility] Cardiothoracic Surgery    Please note that portions of this note may have been completed with a voice recognition program. Efforts were made to edit the dictations, but occasionally words are mistranscribed.

## 2020-10-22 ENCOUNTER — READMISSION MANAGEMENT (OUTPATIENT)
Dept: CALL CENTER | Facility: HOSPITAL | Age: 75
End: 2020-10-22

## 2020-10-22 NOTE — OUTREACH NOTE
CT Surgery Week 4 Survey      Responses   [facility] patient discharged from?  [place]   Does the patient have one of the following disease processes/diagnoses(primary or secondary)?  Cardiothoracic surgery   Week 4 attempt successful?  Yes   Call start time  1117   Call end time  1119   Discharge diagnosis  Endovascular repair of abdominal aortic aneurysm   Meds reviewed with patient/caregiver?  Yes   Is the patient taking all medications as directed (includes completed medication regime)?  Yes   Has the patient kept scheduled appointments due by today?  Yes   Is the patient still receiving Home Health Services?  N/A   What is the patient's perception of their health status since discharge?  Improving   Week 4 Call Completed?  Yes   Would the patient like one additional call?  No   Graduated  Yes   Did the patient feel the follow up calls were helpful during their recovery period?  Yes   Was the number of calls appropriate?  Yes   Wrap up additional comments  /75. Had surgical appt today and everything looks good.  He feels very good.  He kept call short.           Daisy Jose RN

## 2020-10-30 ENCOUNTER — HOSPITAL ENCOUNTER (OUTPATIENT)
Dept: CT IMAGING | Facility: HOSPITAL | Age: 75
Discharge: HOME | End: 2020-10-30
Admitting: PHYSICIAN ASSISTANT

## 2020-10-30 DIAGNOSIS — I71.40 ABDOMINAL AORTIC ANEURYSM (AAA) WITHOUT RUPTURE (HCC): ICD-10-CM

## 2020-10-30 LAB — CREAT BLDA-MCNC: 1.9 MG/DL (ref 0.6–1.3)

## 2020-10-30 PROCEDURE — 74178 CT ABD&PLV WO CNTR FLWD CNTR: CPT

## 2020-10-30 PROCEDURE — 82565 ASSAY OF CREATININE: CPT

## 2020-10-30 PROCEDURE — 25010000002 IOPAMIDOL 61 % SOLUTION: Performed by: PHYSICIAN ASSISTANT

## 2020-10-30 RX ADMIN — IOPAMIDOL 65 ML: 612 INJECTION, SOLUTION INTRAVENOUS at 11:20

## 2020-11-09 ENCOUNTER — OFFICE VISIT (OUTPATIENT)
Dept: CARDIOLOGY | Facility: CLINIC | Age: 75
End: 2020-11-09

## 2020-11-09 VITALS
DIASTOLIC BLOOD PRESSURE: 62 MMHG | OXYGEN SATURATION: 95 % | BODY MASS INDEX: 31.18 KG/M2 | HEART RATE: 59 BPM | HEIGHT: 66 IN | TEMPERATURE: 98 F | SYSTOLIC BLOOD PRESSURE: 138 MMHG | WEIGHT: 194 LBS

## 2020-11-09 DIAGNOSIS — I10 ESSENTIAL HYPERTENSION: ICD-10-CM

## 2020-11-09 DIAGNOSIS — I25.810 CORONARY ARTERY DISEASE INVOLVING CORONARY BYPASS GRAFT OF NATIVE HEART WITHOUT ANGINA PECTORIS: Primary | ICD-10-CM

## 2020-11-09 DIAGNOSIS — E78.5 DYSLIPIDEMIA: ICD-10-CM

## 2020-11-09 PROCEDURE — 99214 OFFICE O/P EST MOD 30 MIN: CPT | Performed by: NURSE PRACTITIONER

## 2020-11-09 NOTE — PROGRESS NOTES
Subjective:     Encounter Date:11/09/2020    Primary Care Physician: Harsha Smith MD      Patient ID: Cal Frausto is a 75 y.o. male.    Chief Complaint:Coronary Artery Disease    PROBLEM LIST:  1.  Coronary artery disease:  a. [facility] for NSTEMI, April 1989: Occluded LAD. RCA and LCx within normal limits.   b. [facility], 03/2004, for abnormal Carmultivessel disease.  c. CABG x3, 04/19/2004, Dr. Cardona: LIMA to LAD, SVG to the ramus and OM1, SVG to PDA.   d. [facility], 11/2004: Occluded SVG to the RCA. 80% LM with an occluded LAD with a patent LIMA graft to the LAD. Occluded OM1 with a patent SVG to the OM.    e. [facility], 07/30/2013: 100% RCA. Occluded SVG. Patent LIMA with distal LAD occlusion. Widely patent SVG to ramus and OM-1, filling other natives of the collaterals with normal LVEF.  f. [facility], 05/04/2018: 90% proximal LCx supplying the large left posterior lateral and collaterals to the RCA/right PDA. Successful treatment with 3.0 x 12 Xience EES. Widely patent LIMA however distal LAD occluded. Patent SVG to the ramus branch. 30% disease distal. EF 50%.  2. DVT:  a. 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 Acute LLE superficial thrombophlebitis noted in the greater saphenous (above knee) and varicosity (below knee). Chronic LLE superficial thrombophlebitis noted in the greater saphenous (below knee).  3. Hypertension.  4. Dyslipidemia.  5. AAA  a. CTA Abdomen, 10/16/2017: 5.0 cm infrarenal AAA, followed by Dr. Maame obrien 9/17/2020 endovascular repair of abdominal aortic aneurysm, Dr. Edwards  6. Type 2 diabetes.   7. Ongoing tobacco abuse.  8. Renal cancer 2019  a. Status post left nephrectomy  9. History of alcohol abuse, none since 1977.  10. COPD with nocturnal oxygen use.  11. Arthritis.  12. Hypothyroidism.  13. History of childhood seizures.  14. Left wrist cyst removal x2.  15. Left carpal tunnel surgery.  16. Bell’s palsy in 2012.        No Known  Allergies      Current Outpatient Medications:   •  aspirin 81 MG tablet, Take 81 mg by mouth daily., Disp: , Rfl:   •  atenolol (TENORMIN) 50 MG tablet, Take 50 mg by mouth daily., Disp: , Rfl:   •  fenofibrate 160 MG tablet, Take 160 mg by mouth daily., Disp: , Rfl:   •  finasteride (PROSCAR) 5 MG tablet, Take 5 mg by mouth Daily., Disp: , Rfl:   •  isosorbide mononitrate (IMDUR) 60 MG 24 hr tablet, Take 60 mg by mouth daily., Disp: , Rfl:   •  levothyroxine (SYNTHROID, LEVOTHROID) 112 MCG tablet, Take 112 mcg by mouth daily., Disp: , Rfl:   •  losartan-hydrochlorothiazide (HYZAAR) 100-25 MG per tablet, Take 0.5 tablets by mouth Daily., Disp: , Rfl:   •  nitroglycerin (NITROSTAT) 0.4 MG SL tablet, 1 under the tongue as needed for angina, may repeat q5mins for up three doses (Patient taking differently: Place 0.4 mg under the tongue Every 5 (Five) Minutes As Needed. 1 under the tongue as needed for angina, may repeat q5mins for up three doses), Disp: 25 tablet, Rfl: 3  •  O2 (OXYGEN), Inhale 2 L/min Every Night., Disp: , Rfl:   •  rosuvastatin (CRESTOR) 40 MG tablet, Take 1 tablet by mouth Every Night., Disp: 90 tablet, Rfl: 3  •  SITagliptin (Januvia) 100 MG tablet, Take 100 mg by mouth Daily., Disp: , Rfl:   •  thyroid (ARMOUR) 15 MG tablet, Take 15 mg by mouth daily., Disp: , Rfl:   •  warfarin (COUMADIN) 3 MG tablet, Take 5 mg by mouth Daily. Last dose 9-12-20 per Dr. Edwards's orders, Disp: , Rfl:   •  warfarin (COUMADIN) 4 MG tablet, Alternating dosages; last dose 9-12-20 per Dr. Edwards's orders, Disp: , Rfl:         History of Present Illness    Patient is a 75-year-old  male who we are seeing today for follow-up of coronary artery disease, hypertension and dyslipidemia.  Since last being seen he underwent endovascular aortic aneurysm repair.  Since that time he notes to be doing well.  He denies any chest pain, pressure, tightness.  Denies any increase shortness of breath.  No syncope, near-syncope,  "or edema.  He only checks his glucose once a week.  At the time of his abdominal aortic repair his hemoglobin A1c was noted to be 11.2.  This is been previously in the sevens range.  He thinks he sees his primary care in about 3 months.  Overall, feels that he is doing well from a cardiac standpoint.  He is compliant with his medications.    The following portions of the patient's history were reviewed and updated as appropriate: allergies, current medications, past family history, past medical history, past social history, past surgical history and problem list.      Social History     Tobacco Use   • Smoking status: Current Every Day Smoker     Packs/day: 1.00     Years: 54.00     Pack years: 54.00     Types: Cigarettes   • Smokeless tobacco: Never Used   • Tobacco comment: smoked about 54 years.    Substance Use Topics   • Alcohol use: No   • Drug use: No         Review of Systems   Constitution: Negative.   Cardiovascular: Negative for chest pain, dyspnea on exertion, leg swelling, palpitations and syncope.   Respiratory: Negative.  Negative for shortness of breath.    Hematologic/Lymphatic: Negative for bleeding problem. Does not bruise/bleed easily.   Skin: Negative for rash.   Musculoskeletal: Positive for arthritis. Negative for muscle weakness and myalgias.   Gastrointestinal: Negative for heartburn, nausea and vomiting.   Neurological: Negative for dizziness, light-headedness, loss of balance and numbness.          Objective:   /62 (BP Location: Left arm, Patient Position: Sitting)   Pulse 59   Temp 98 °F (36.7 °C)   Ht 167.6 cm (66\")   Wt 88 kg (194 lb)   SpO2 95%   BMI 31.31 kg/m²         Vitals signs reviewed.   Constitutional:       Appearance: Healthy appearance. Well-developed and not in distress.   Neck:      Vascular: No JVD.      Trachea: No tracheal deviation.   Pulmonary:      Effort: Pulmonary effort is normal.      Breath sounds: Normal breath sounds.   Cardiovascular:      Normal " rate. Regular rhythm.   Pulses:     Intact distal pulses.   Edema:     Peripheral edema absent.   Abdominal:      General: Bowel sounds are normal.      Palpations: Abdomen is soft.      Tenderness: There is no abdominal tenderness.   Musculoskeletal:         General: No deformity.   Skin:     General: Skin is warm and dry.   Neurological:      Mental Status: Alert and oriented to person, place, and time.         Procedures          Assessment:   Assessment/Plan      Diagnoses and all orders for this visit:    1. Coronary artery disease involving coronary bypass graft of native heart without angina pectoris (Primary).  Stable.  On low-dose aspirin.    2. Essential hypertension, controlled.  On atenolol and Hyzaar.    3. Dyslipidemia, on Crestor.      Plan:  1. Continue current cardiac medications.  2. Discussed with patient his recent elevated hemoglobin A1c.  Will forward this to his primary care physician and discussed with the patient he may need to be seen sooner than 3 months.  3. Follow-up in 1 year's time or sooner if needed.       Keira WOODS     Dictated utilizing Dragon dictation

## 2021-09-16 ENCOUNTER — TELEPHONE (OUTPATIENT)
Dept: CARDIAC SURGERY | Facility: CLINIC | Age: 76
End: 2021-09-16

## 2021-09-16 DIAGNOSIS — I71.40 ABDOMINAL AORTIC ANEURYSM (AAA) WITHOUT RUPTURE (HCC): Primary | ICD-10-CM

## 2021-09-30 ENCOUNTER — TELEPHONE (OUTPATIENT)
Dept: CARDIAC SURGERY | Facility: CLINIC | Age: 76
End: 2021-09-30

## 2021-09-30 ENCOUNTER — HOSPITAL ENCOUNTER (OUTPATIENT)
Dept: CT IMAGING | Facility: HOSPITAL | Age: 76
Discharge: HOME | End: 2021-09-30
Admitting: NURSE PRACTITIONER

## 2021-09-30 DIAGNOSIS — I71.40 ABDOMINAL AORTIC ANEURYSM (AAA) WITHOUT RUPTURE (HCC): ICD-10-CM

## 2021-09-30 DIAGNOSIS — I71.40 ABDOMINAL AORTIC ANEURYSM (AAA) WITHOUT RUPTURE (HCC): Primary | ICD-10-CM

## 2021-09-30 PROCEDURE — 74176 CT ABD & PELVIS W/O CONTRAST: CPT

## 2021-09-30 PROCEDURE — 82565 ASSAY OF CREATININE: CPT

## 2021-09-30 NOTE — TELEPHONE ENCOUNTER
CT called with pt on table, pts creatinine is 2.2, pt has 1 kidney and diabetes, CT to be done without due to these factors.    S/p sent in June 2020   --c/w home aspirin, plavix S/p stent in June 2020   --c/w home aspirin, plavix

## 2021-10-19 ENCOUNTER — OFFICE VISIT (OUTPATIENT)
Dept: CARDIAC SURGERY | Facility: CLINIC | Age: 76
End: 2021-10-19

## 2021-10-19 VITALS
WEIGHT: 187 LBS | HEIGHT: 67 IN | OXYGEN SATURATION: 98 % | HEART RATE: 76 BPM | DIASTOLIC BLOOD PRESSURE: 88 MMHG | SYSTOLIC BLOOD PRESSURE: 140 MMHG | BODY MASS INDEX: 29.35 KG/M2 | TEMPERATURE: 98.4 F

## 2021-10-19 DIAGNOSIS — Z86.79 S/P AAA REPAIR: Primary | ICD-10-CM

## 2021-10-19 DIAGNOSIS — Z98.890 S/P AAA REPAIR: Primary | ICD-10-CM

## 2021-10-19 DIAGNOSIS — I71.40 ABDOMINAL AORTIC ANEURYSM (AAA) WITHOUT RUPTURE (HCC): ICD-10-CM

## 2021-10-19 PROCEDURE — 99214 OFFICE O/P EST MOD 30 MIN: CPT | Performed by: NURSE PRACTITIONER

## 2021-10-19 RX ORDER — DAPAGLIFLOZIN 5 MG/1
5 TABLET, FILM COATED ORAL DAILY
COMMUNITY

## 2021-10-19 RX ORDER — CLOPIDOGREL BISULFATE 75 MG/1
TABLET ORAL
COMMUNITY
End: 2021-11-15

## 2021-10-19 NOTE — PROGRESS NOTES
"     [facility] Cardiothoracic Surgery Office Follow Up Note     Date of Encounter: 10/19/2021     Name: Cal Frausto  : 1945     Referred By: No ref. provider found  PCP: Harsha Smith MD    Chief Complaint:    Chief Complaint   Patient presents with   • Follow-up     1 year follow up w/ CTA abd/pel Hx of AAA. Pt states that he is feeling well with no complaints.        Subjective      History of Present Illness:    Cal Frausto is a 76 y.o. male current smoker, with a history of HTN, HLD on statin therapy, DVT, renal cell cancer, s/p nephrectomy 10/2019, CAD s/p CABG/stenting, type II DM (last A1C 11.2 2020), and AAA s/p endovascular repair 2020 with Dr. Edwards. Last seen in clinic by Beena WOODS 10/20/2020 for post-op visit, was doing well. Patient presents today for annual follow-up and to review surveillance imaging. Patient reports intermittent chest pain and CARMEN that is chronic in nature with his history CAD, has improved in frequency since visit last year. Patient follows closely with cardiology Dr. Aguilera and has visit scheduled next month. Patient denies any back pain. Continues to smoke 1 ppd. Reports his blood pressure is \"sometimes up, sometimes down\" but typically is below 140/90. Compliant with coumadin therapy managed by his pcp, reports last INR was 2.2.  Patient also follows with cardiology Dr. Aguilera and is scheduled to be seen 11/15/2021.     Review of Systems:  Review of Systems   Constitutional: Negative for chills, fever, malaise/fatigue, night sweats and weight loss.   HENT: Positive for congestion and hearing loss. Negative for nosebleeds and odynophagia.    Cardiovascular: Negative for chest pain, claudication, dyspnea on exertion, leg swelling, orthopnea, palpitations and syncope.   Respiratory: Positive for cough and sputum production. Negative for hemoptysis, shortness of breath and wheezing.    Endocrine: Negative for cold intolerance, heat intolerance, " polydipsia, polyphagia and polyuria.   Hematologic/Lymphatic: Positive for bleeding problem. Bruises/bleeds easily.   Skin: Negative for itching, poor wound healing and rash.   Musculoskeletal: Positive for muscle cramps. Negative for arthritis, back pain, joint pain, joint swelling and myalgias.   Gastrointestinal: Negative for abdominal pain, constipation, diarrhea, hematemesis, melena, nausea and vomiting.   Genitourinary: Positive for frequency. Negative for dysuria, hematuria, nocturia and urgency.   Neurological: Negative for dizziness, light-headedness, loss of balance and numbness.   Psychiatric/Behavioral: Negative for depression and suicidal ideas. The patient is not nervous/anxious.    Allergic/Immunologic: Positive for environmental allergies. Negative for HIV exposure.       I have reviewed the following portions of the patient's history: allergies, current medications, past family history, past medical history, past social history, past surgical history and ROS and confirm it's accurate.    Allergies:  No Known Allergies    Medications:      Current Outpatient Medications:   •  aspirin 81 MG tablet, Take 81 mg by mouth daily., Disp: , Rfl:   •  atenolol (TENORMIN) 50 MG tablet, Take 50 mg by mouth daily., Disp: , Rfl:   •  dapagliflozin (Farxiga) 5 MG tablet tablet, Take 5 mg by mouth Daily., Disp: , Rfl:   •  fenofibrate 160 MG tablet, Take 160 mg by mouth daily., Disp: , Rfl:   •  finasteride (PROSCAR) 5 MG tablet, Take 5 mg by mouth Daily., Disp: , Rfl:   •  isosorbide mononitrate (IMDUR) 60 MG 24 hr tablet, Take 60 mg by mouth daily., Disp: , Rfl:   •  levothyroxine (SYNTHROID, LEVOTHROID) 112 MCG tablet, Take 112 mcg by mouth daily., Disp: , Rfl:   •  losartan-hydrochlorothiazide (HYZAAR) 100-25 MG per tablet, Take 0.5 tablets by mouth Daily., Disp: , Rfl:   •  nitroglycerin (NITROSTAT) 0.4 MG SL tablet, 1 under the tongue as needed for angina, may repeat q5mins for up three doses (Patient taking  differently: Place 0.4 mg under the tongue Every 5 (Five) Minutes As Needed. 1 under the tongue as needed for angina, may repeat q5mins for up three doses), Disp: 25 tablet, Rfl: 3  •  O2 (OXYGEN), Inhale 2 L/min Every Night., Disp: , Rfl:   •  rosuvastatin (CRESTOR) 40 MG tablet, Take 1 tablet by mouth Every Night., Disp: 90 tablet, Rfl: 3  •  SITagliptin (Januvia) 100 MG tablet, Take 100 mg by mouth Daily., Disp: , Rfl:   •  thyroid (ARMOUR) 15 MG tablet, Take 15 mg by mouth daily., Disp: , Rfl:   •  warfarin (COUMADIN) 3 MG tablet, Take 5 mg by mouth Daily. Last dose 9-12-20 per Dr. Edwards's orders, Disp: , Rfl:   •  warfarin (COUMADIN) 4 MG tablet, Alternating dosages; last dose 9-12-20 per Dr. 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   • History of seizures as a child    • Hypertension    • Hypothyroidism    • Pneumonia    • Seizures (HCC)     History of childhood seizures.   • Tobacco abuse     Ongoing   • Type 2 diabetes mellitus (HCC)        Past Surgical History:   Procedure Laterality Date   • ABDOMINAL AORTIC ANEURYSM REPAIR WITH ENDOGRAFT N/A 9/17/2020    Procedure: ABDOMINAL AORTIC ANEURYSM REPAIR WITH ENDOGRAFT;  Surgeon: Flynn Edwards MD;  Location: [facility] OR ;  Service: Cardiothoracic;  Laterality: N/A;  FLUORO - 13 MINS 12 SECS  DOSE - 716mGy  CONTRAST - 70 ml isovue 300   • CARDIAC CATHETERIZATION  11/2004    revealed an occluded saphenous vein graft to the RCA. There  "was an 80% left main with an occluded LAD with a patent LIMA graft to the LAD. There was also an occluded OM1 with a patent saphenous vein graft to the OM.   • CARDIAC CATHETERIZATION  07/30/2013    with 100% RCA. Occluded SVG. With 100% LAD. Patent LINDSEY with distal LAD occlusion. Widely patent SVG to ramus and OM-1, filling other natives of the collaterals with normal LVEF.   • CARDIAC CATHETERIZATION N/A 5/4/2018    Procedure: Left Heart Cath;  Surgeon: Keegan Aguilera MD;  Location: [facility] CATH INVASIVE LOCATION;  Service: Cardiovascular   • CARPAL TUNNEL RELEASE     • COLONOSCOPY  2018   • CORONARY ANGIOPLASTY WITH STENT PLACEMENT     • CORONARY ARTERY BYPASS GRAFT  2004    x3   • CYST REMOVAL Left     wrist x2   • KIDNEY SURGERY  2018   • POLYPECTOMY         Social History     Socioeconomic History   • Marital status:    • Number of children: 3   Tobacco Use   • Smoking status: Current Every Day Smoker     Packs/day: 1.00     Years: 54.00     Pack years: 54.00     Types: Cigarettes   • Smokeless tobacco: Never Used   • Tobacco comment: smoked about 54 years.    Vaping Use   • Vaping Use: Never used   Substance and Sexual Activity   • Alcohol use: No   • Drug use: No   • Sexual activity: Defer        Family History   Problem Relation Age of Onset   • No Known Problems Sister        Objective     Physical Exam:  Vitals:    10/19/21 0858   BP: 140/88   Pulse: 76   Temp: 98.4 °F (36.9 °C)   SpO2: 98%   Weight: 84.8 kg (187 lb)   Height: 170.2 cm (67\")      Body mass index is 29.29 kg/m².    Physical Exam  Vitals and nursing note reviewed.   Constitutional:       Appearance: Normal appearance. He is well-developed.   HENT:      Head: Normocephalic and atraumatic.   Eyes:      Pupils: Pupils are equal, round, and reactive to light.   Neck:      Vascular: No carotid bruit.   Cardiovascular:      Rate and Rhythm: Normal rate and regular rhythm.      Pulses: Normal pulses.      Heart sounds: Normal heart sounds, " S1 normal and S2 normal. No murmur heard.      Pulmonary:      Effort: Pulmonary effort is normal.      Comments: Coarse to auscultation throughout all lobes, unlabored.   Abdominal:      Palpations: Abdomen is soft.   Musculoskeletal:         General: No swelling.      Cervical back: Neck supple.      Right lower leg: No edema.      Left lower leg: No edema.   Skin:     General: Skin is warm and dry.      Capillary Refill: Capillary refill takes less than 2 seconds.      Findings: No bruising.   Neurological:      General: No focal deficit present.      Mental Status: He is alert and oriented to person, place, and time. Mental status is at baseline.      GCS: GCS eye subscore is 4. GCS verbal subscore is 5. GCS motor subscore is 6.      Motor: Motor function is intact.      Coordination: Coordination is intact.      Gait: Gait is intact.   Psychiatric:         Mood and Affect: Mood normal.         Speech: Speech normal.         Behavior: Behavior normal. Behavior is cooperative.         Cognition and Memory: Cognition normal.         Imaging/Labs:    CT Abdomen Pelvis Without Contrast 10/4/2021  Personally reviewed: largest diameter measuring 6.0 x 5.1mm, stable size of native sac of AAA. No enlargment of native aneurysm sac, which indicates no evidence endoleak. This scan is without contrast, cannot speak definitively about endoleak.   Impression: Successful endovascular stent repair of the intrarenal abdominal aorta with overall decrease seen in size of the native abdominal aortic aneurysm in the interval. No CT evidence of acute intra-abdominal or pelvic abnormality. Diverticulosis with no evidence of diverticulitis.  DICTATED:   10/02/2021 EDITED/ls :   10/02/2021   This report was finalized on 10/4/2021 9:03 AM by Dr. Kerri Arzate MD.      CT abd/pelvis with and without contrast 10/20/2020  Patient status post endovascular stent repair of an infrarenal  abdominal aortic aneurysm. The stent is seen in  satisfactory position.  The native abdominal aortic aneurysm largest dimension measures 6.3 x  5.7 cm and previously measured 6.2 x 5.8 cm. IMPRESSION:  Successful endovascular stent repair of the infrarenal  abdominal aortic aneurysm with no change seen in size of the native  abdominal aorta. No extravasation of contrast to suggest evidence of a  leak. The remainder of the CT abdomen and pelvis is stable and grossly  unremarkable.    CT abd/pelvis without contrast 8/6/2020-preop scan  IMPRESSION:  1.Soft tissue body wall findings demonstrates small ventral abdominal  wall hernia with contained single bowel wall Collins-type hernia without  surrounding inflammation or focal fluid collection. No evidence for  mechanical obstructive process.  2. Enlarging aneurysmal infrarenal abdominal aorta from 2017 exam  comparison now measuring up to 6.4 cm previously 5.4 cm on that exam  with CTA and/or vascular surgery consultation recommended for further  evaluation.    Assessment / Plan      Assessment / Plan:  Diagnoses and all orders for this visit:    1. S/P AAA endograft repair 9/17/2020 (Primary)    2. Abdominal aortic aneurysm (AAA) without rupture (HCC)       1. AAA s/p endograft repair 9/17/20 with Dr. Edwards: Patient doing well overwell, denies any acute complaints. Reporting chronic intermittent chest pain which has improved in nature, following closely with Dr. Aguilera next month. Patient is aware to speak with his cardiologist is chest pain worsens in nature or becomes more frequent. CT abd/pelvis results reviewed with patient revealing stable endograft repair and size of native aneurysmal sac. Compliant with Coumadin therapy, managed by pcp Dr. Smith. Discussed smoking cessation, patient not interested at this time. Blood pressure was slightly elevated today, patient had not taken his medication at time of visit. Discussed blood pressure control with goal of less than 120/80, followed by his pcp. Will plan to  see patient back in one year with repeat CTA abdomen/pelvis (if not possible due to left nephrectomy, can do without contrast) for surveillance.      Follow Up:   Return in about 1 year (around 10/19/2022) for F/u with imaging CTA abdomen/pelvis.   Or sooner for any further concerns or worsening sign and symptoms. If unable to reach us in the office please dial 911 or go to the nearest emergency department.      Blanquita WOODS  [facility] Cardiothoracic Surgery    Time Spent: I spent 40 minutes caring for Cal on this date of service. This time includes time spent by me in the following activities: preparing for the visit, reviewing tests, obtaining and/or reviewing a separately obtained history, performing a medically appropriate examination and/or evaluation, counseling and educating the patient/family/caregiver, ordering medications, tests, or procedures, documenting information in the medical record and independently interpreting results and communicating that information with the patient/family/caregiver.

## 2021-10-22 LAB — CREAT BLDA-MCNC: 2.2 MG/DL (ref 0.6–1.3)

## 2021-11-12 NOTE — PROGRESS NOTES
[facility] Cardiology  Subjective:     Encounter Date: 11/15/2021      Patient ID: Cal Frausto is a 76 y.o. male.    Chief Complaint: Coronary Artery Disease      PROBLEM LIST:  1. Coronary artery disease  a. [facility] for NSTEMI, April 1989: Occluded LAD. RCA and LCx within normal limits.   b. [facility], 03/2004, for abnormal Carmultivessel disease.  c. CABG x3, 04/19/2004, Dr. Cardona: LIMA to LAD, SVG to the ramus and OM1, SVG to PDA.   d. [facility], 11/2004: Occluded SVG to the RCA. 80% LM with an occluded LAD with a patent LIMA graft to the LAD. Occluded OM1 with a patent SVG to the OM.    e. [facility], 07/30/2013: 100% RCA. Occluded SVG. Patent LIMA with distal LAD occlusion. Widely patent SVG to ramus and OM-1, filling other natives of the collaterals with normal LVEF.  f. [facility], 05/04/2018: 90% proximal LCx supplying the large left posterior lateral and collaterals to the RCA/right PDA. Successful treatment with 3.0 x 12 Xience EES. Widely patent LIMA however distal LAD occluded. Patent SVG to the ramus branch. 30% disease distal. EF 50%.  2. DVT:  a. 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 Acute LLE superficial thrombophlebitis noted in the greater saphenous (above knee) and varicosity (below knee). Chronic LLE superficial thrombophlebitis noted in the greater saphenous (below knee).  3. Hypertension.  4. Dyslipidemia.  5. AAA  a. CTA Abdomen, 10/16/2017: 5.0 cm infrarenal AAA, followed by Dr. Maame obrien 9/17/2020 endovascular repair of abdominal aortic aneurysm, Dr. Grace decker CT A/P 9/30/2021: AAA measures 5.5 x 4.8 cm, previously was 6.2 x 5.6 cm.   6. Type 2 diabetes.   7. Ongoing tobacco abuse.  8. Renal cancer 2019  a. Status post left nephrectomy  9. History of alcohol abuse, none since 1977.  10. COPD with nocturnal oxygen use.  11. Arthritis.  12. Hypothyroidism.  13. History of childhood seizures.  14. Left wrist cyst removal  x2.  15. Left carpal tunnel surgery.  16. Bell’s palsy in 2012.      History of Present Illness  Cal A High returns today for an annual follow up with a history of coronary artery disease and cardiac risk factors. Since last visit, the patient has been doing well overall from a cardiovascular standpoint. He may experience chest pain once every three months but since stent placement it has significantly improved. He continues to smoke tobacco. Patient denies shortness of breath, palpitations, edema, dizziness, and syncope. Patient has had no interim ER visits, hospitalizations, serious illnesses, or injuries.    No Known Allergies      Current Outpatient Medications:   •  aspirin 81 MG tablet, Take 81 mg by mouth daily., Disp: , Rfl:   •  atenolol (TENORMIN) 50 MG tablet, Take 50 mg by mouth daily., Disp: , Rfl:   •  dapagliflozin (Farxiga) 5 MG tablet tablet, Take 5 mg by mouth Daily., Disp: , Rfl:   •  fenofibrate 160 MG tablet, Take 160 mg by mouth daily., Disp: , Rfl:   •  finasteride (PROSCAR) 5 MG tablet, Take 5 mg by mouth Daily., Disp: , Rfl:   •  isosorbide mononitrate (IMDUR) 60 MG 24 hr tablet, Take 60 mg by mouth daily., Disp: , Rfl:   •  levothyroxine (SYNTHROID, LEVOTHROID) 112 MCG tablet, Take 112 mcg by mouth daily., Disp: , Rfl:   •  losartan-hydrochlorothiazide (HYZAAR) 100-25 MG per tablet, Take 0.5 tablets by mouth Daily., Disp: , Rfl:   •  nitroglycerin (NITROSTAT) 0.4 MG SL tablet, 1 under the tongue as needed for angina, may repeat q5mins for up three doses (Patient taking differently: Place 0.4 mg under the tongue Every 5 (Five) Minutes As Needed. 1 under the tongue as needed for angina, may repeat q5mins for up three doses), Disp: 25 tablet, Rfl: 3  •  O2 (OXYGEN), Inhale 2 L/min Every Night., Disp: , Rfl:   •  rosuvastatin (CRESTOR) 40 MG tablet, Take 1 tablet by mouth Every Night., Disp: 90 tablet, Rfl: 3  •  SITagliptin (Januvia) 100 MG tablet, Take 100 mg by mouth Daily., Disp: , Rfl:  "  •  thyroid (ARMOUR) 15 MG tablet, Take 15 mg by mouth daily., Disp: , Rfl:   •  warfarin (COUMADIN) 3 MG tablet, Take 5 mg by mouth Daily. Last dose 9-12-20 per Dr. Edwards's orders, Disp: , Rfl:   •  warfarin (COUMADIN) 4 MG tablet, Alternating dosages; last dose 9-12-20 per Dr. Edwards's orders, Disp: , Rfl:     The following portions of the patient's history were reviewed and updated as appropriate: allergies, current medications, past family history, past medical history, past social history, past surgical history and problem list.    Review of Systems   Constitutional: Negative.   Cardiovascular: Positive for chest pain. Negative for dyspnea on exertion, leg swelling, palpitations and syncope.   Respiratory: Negative.  Negative for shortness of breath.    Hematologic/Lymphatic: Negative for bleeding problem. Does not bruise/bleed easily.   Skin: Negative for rash.   Musculoskeletal: Negative for muscle weakness and myalgias.   Gastrointestinal: Negative for heartburn, nausea and vomiting.   Neurological: Negative for dizziness, light-headedness, loss of balance and numbness.          Objective:     Vitals:    11/15/21 1017   BP: 110/66   BP Location: Left arm   Patient Position: Sitting   Cuff Size: Adult   Pulse: 68   SpO2: 93%   Weight: 80.9 kg (178 lb 6.4 oz)   Height: 170.2 cm (67\")         Constitutional:       Appearance: Well-developed.   Neck:      Thyroid: No thyromegaly.      Vascular: No carotid bruit or JVD.   Pulmonary:      Breath sounds: Normal breath sounds.   Cardiovascular:      Regular rhythm.      No gallop. No S3 and S4 gallop.   Edema:     Peripheral edema absent.   Abdominal:      General: Bowel sounds are normal.      Palpations: Abdomen is soft. There is no abdominal mass.      Tenderness: There is no abdominal tenderness.   Skin:     General: Skin is warm and dry.      Findings: No rash.   Neurological:      Mental Status: Alert and oriented to person, place, and time.         Lab " Review:    Lab date: 7/26/2021  • FLP: , , HDL 36.9,   • CMP: Glu 193, BUN 27, Creat 1.53, eGFR 45, Na 135, K 5.2, Cl 99, CO2 28, Ca 9.3, Alk Phos 39, AST 14, ALT 11  • CBC: WBC 5.4, RBC 5.76, HGB 15.8, HCT 48.6, MCV 84, MCH 27.4,   • HbA1c: 9.3    Lab Results   Component Value Date    CREATININE 2.20 (H) 09/30/2021        Procedures        Assessment:   Diagnoses and all orders for this visit:    1. Coronary artery disease involving coronary bypass graft of native heart without angina pectoris (Primary)    2. Essential hypertension    3. Dyslipidemia        Impression:  Coronary artery disease. Occasional CP episodes but overall stable. On aspirin and Plavix for DAPT, Imdur daily, and nitroglycerin as needed for angina.   Hypertension. Well controlled. On atenolol and losartan-HCTZ.  Hyperlipidemia. Acceptable control. On fenofibrate and rosuvastatin.     Plan:  1. Stable cardiac status.  2. Continue current medications.  3. Revisit in 12 MO, or sooner as needed.    Scribed for Keegan Aguilera MD by Bernie Nieto. 11/15/2021 10:54 EST      Keegan Aguilera MD      Please note that portions of this note may have been completed with a voice recognition program. Efforts were made to edit the dictations, but occasionally words are mistranscribed.

## 2021-11-15 ENCOUNTER — OFFICE VISIT (OUTPATIENT)
Dept: CARDIOLOGY | Facility: CLINIC | Age: 76
End: 2021-11-15

## 2021-11-15 VITALS
BODY MASS INDEX: 28 KG/M2 | DIASTOLIC BLOOD PRESSURE: 66 MMHG | HEART RATE: 68 BPM | SYSTOLIC BLOOD PRESSURE: 110 MMHG | HEIGHT: 67 IN | OXYGEN SATURATION: 93 % | WEIGHT: 178.4 LBS

## 2021-11-15 DIAGNOSIS — I25.810 CORONARY ARTERY DISEASE INVOLVING CORONARY BYPASS GRAFT OF NATIVE HEART WITHOUT ANGINA PECTORIS: Primary | ICD-10-CM

## 2021-11-15 DIAGNOSIS — E78.5 DYSLIPIDEMIA: ICD-10-CM

## 2021-11-15 DIAGNOSIS — I10 ESSENTIAL HYPERTENSION: ICD-10-CM

## 2021-11-15 PROCEDURE — 99214 OFFICE O/P EST MOD 30 MIN: CPT | Performed by: INTERNAL MEDICINE

## 2021-12-04 ENCOUNTER — HOSPITAL ENCOUNTER (INPATIENT)
Facility: HOSPITAL | Age: 76
LOS: 3 days | Discharge: HOME | End: 2021-12-07
Attending: EMERGENCY MEDICINE | Admitting: INTERNAL MEDICINE

## 2021-12-04 DIAGNOSIS — R73.9 HYPERGLYCEMIA: ICD-10-CM

## 2021-12-04 DIAGNOSIS — N28.9 RENAL INSUFFICIENCY: ICD-10-CM

## 2021-12-04 DIAGNOSIS — D64.9 ANEMIA, UNSPECIFIED TYPE: Primary | ICD-10-CM

## 2021-12-04 DIAGNOSIS — R79.1 SUPRATHERAPEUTIC INR: ICD-10-CM

## 2021-12-04 DIAGNOSIS — Z79.01 ANTICOAGULATED ON COUMADIN: ICD-10-CM

## 2021-12-04 DIAGNOSIS — K92.1 GASTROINTESTINAL HEMORRHAGE WITH MELENA: ICD-10-CM

## 2021-12-04 LAB
ABO GROUP BLD: NORMAL
ALBUMIN SERPL-MCNC: 3.3 G/DL (ref 3.5–5.2)
ALBUMIN/GLOB SERPL: 1.1 G/DL
ALP SERPL-CCNC: 44 U/L (ref 39–117)
ALT SERPL W P-5'-P-CCNC: 15 U/L (ref 1–41)
ANION GAP SERPL CALCULATED.3IONS-SCNC: 12 MMOL/L (ref 5–15)
ANION GAP SERPL CALCULATED.3IONS-SCNC: 13 MMOL/L (ref 5–15)
AST SERPL-CCNC: 20 U/L (ref 1–40)
BACTERIA UR QL AUTO: ABNORMAL /HPF
BASOPHILS # BLD AUTO: 0.02 10*3/MM3 (ref 0–0.2)
BASOPHILS NFR BLD AUTO: 0.2 % (ref 0–1.5)
BILIRUB SERPL-MCNC: 0.5 MG/DL (ref 0–1.2)
BILIRUB UR QL STRIP: NEGATIVE
BLD GP AB SCN SERPL QL: NEGATIVE
BUN SERPL-MCNC: 23 MG/DL (ref 8–23)
BUN SERPL-MCNC: 23 MG/DL (ref 8–23)
BUN/CREAT SERPL: 14.6 (ref 7–25)
BUN/CREAT SERPL: 14.7 (ref 7–25)
CALCIUM SPEC-SCNC: 8.1 MG/DL (ref 8.6–10.5)
CALCIUM SPEC-SCNC: 8.4 MG/DL (ref 8.6–10.5)
CHLORIDE SERPL-SCNC: 94 MMOL/L (ref 98–107)
CHLORIDE SERPL-SCNC: 95 MMOL/L (ref 98–107)
CLARITY UR: ABNORMAL
CO2 SERPL-SCNC: 22 MMOL/L (ref 22–29)
CO2 SERPL-SCNC: 23 MMOL/L (ref 22–29)
COLOR UR: ABNORMAL
CREAT SERPL-MCNC: 1.56 MG/DL (ref 0.76–1.27)
CREAT SERPL-MCNC: 1.57 MG/DL (ref 0.76–1.27)
DEPRECATED RDW RBC AUTO: 51.8 FL (ref 37–54)
EOSINOPHIL # BLD AUTO: 0.04 10*3/MM3 (ref 0–0.4)
EOSINOPHIL NFR BLD AUTO: 0.5 % (ref 0.3–6.2)
ERYTHROCYTE [DISTWIDTH] IN BLOOD BY AUTOMATED COUNT: 17.5 % (ref 12.3–15.4)
GFR SERPL CREATININE-BSD FRML MDRD: 43 ML/MIN/1.73
GFR SERPL CREATININE-BSD FRML MDRD: 44 ML/MIN/1.73
GLOBULIN UR ELPH-MCNC: 2.9 GM/DL
GLUCOSE SERPL-MCNC: 280 MG/DL (ref 65–99)
GLUCOSE SERPL-MCNC: 288 MG/DL (ref 65–99)
GLUCOSE UR STRIP-MCNC: ABNORMAL MG/DL
HCT VFR BLD AUTO: 17.7 % (ref 37.5–51)
HCT VFR BLD AUTO: 17.8 % (ref 37.5–51)
HGB BLD-MCNC: 5.5 G/DL (ref 13–17.7)
HGB BLD-MCNC: 5.7 G/DL (ref 13–17.7)
HGB UR QL STRIP.AUTO: ABNORMAL
HYALINE CASTS UR QL AUTO: ABNORMAL /LPF
IMM GRANULOCYTES # BLD AUTO: 0.22 10*3/MM3 (ref 0–0.05)
IMM GRANULOCYTES NFR BLD AUTO: 2.7 % (ref 0–0.5)
INR PPP: 1.31 (ref 0.85–1.16)
INR PPP: >10 (ref 0.85–1.16)
INR PPP: >10 (ref 0.85–1.16)
IRON 24H UR-MRATE: 96 MCG/DL (ref 59–158)
IRON SATN MFR SERPL: 28 % (ref 20–50)
KETONES UR QL STRIP: NEGATIVE
LEUKOCYTE ESTERASE UR QL STRIP.AUTO: NEGATIVE
LYMPHOCYTES # BLD AUTO: 1.19 10*3/MM3 (ref 0.7–3.1)
LYMPHOCYTES NFR BLD AUTO: 14.6 % (ref 19.6–45.3)
MCH RBC QN AUTO: 27.5 PG (ref 26.6–33)
MCHC RBC AUTO-ENTMCNC: 32 G/DL (ref 31.5–35.7)
MCV RBC AUTO: 86 FL (ref 79–97)
MONOCYTES # BLD AUTO: 0.59 10*3/MM3 (ref 0.1–0.9)
MONOCYTES NFR BLD AUTO: 7.2 % (ref 5–12)
NEUTROPHILS NFR BLD AUTO: 6.11 10*3/MM3 (ref 1.7–7)
NEUTROPHILS NFR BLD AUTO: 74.8 % (ref 42.7–76)
NITRITE UR QL STRIP: NEGATIVE
NRBC BLD AUTO-RTO: 2.2 /100 WBC (ref 0–0.2)
PH UR STRIP.AUTO: 5.5 [PH] (ref 5–8)
PLATELET # BLD AUTO: 314 10*3/MM3 (ref 140–450)
PMV BLD AUTO: 9.1 FL (ref 6–12)
POTASSIUM SERPL-SCNC: 4.2 MMOL/L (ref 3.5–5.2)
POTASSIUM SERPL-SCNC: 4.2 MMOL/L (ref 3.5–5.2)
PROT SERPL-MCNC: 6.2 G/DL (ref 6–8.5)
PROT UR QL STRIP: ABNORMAL
PROTHROMBIN TIME: 15.9 SECONDS (ref 11.4–14.4)
PROTHROMBIN TIME: >120 SECONDS (ref 11.4–14.4)
PROTHROMBIN TIME: >120 SECONDS (ref 11.4–14.4)
RBC # BLD AUTO: 2.07 10*6/MM3 (ref 4.14–5.8)
RBC # UR STRIP: ABNORMAL /HPF
REF LAB TEST METHOD: ABNORMAL
RETICS # AUTO: 0.14 10*6/MM3 (ref 0.02–0.13)
RETICS/RBC NFR AUTO: 6.93 % (ref 0.7–1.9)
RH BLD: POSITIVE
SODIUM SERPL-SCNC: 129 MMOL/L (ref 136–145)
SODIUM SERPL-SCNC: 130 MMOL/L (ref 136–145)
SP GR UR STRIP: 1.03 (ref 1–1.03)
SQUAMOUS #/AREA URNS HPF: ABNORMAL /HPF
T&S EXPIRATION DATE: NORMAL
TIBC SERPL-MCNC: 347 MCG/DL (ref 298–536)
TRANSFERRIN SERPL-MCNC: 233 MG/DL (ref 200–360)
UROBILINOGEN UR QL STRIP: ABNORMAL
WBC # UR STRIP: ABNORMAL /HPF
WBC NRBC COR # BLD: 8.17 10*3/MM3 (ref 3.4–10.8)

## 2021-12-04 PROCEDURE — 85045 AUTOMATED RETICULOCYTE COUNT: CPT | Performed by: INTERNAL MEDICINE

## 2021-12-04 PROCEDURE — 85018 HEMOGLOBIN: CPT | Performed by: INTERNAL MEDICINE

## 2021-12-04 PROCEDURE — 85610 PROTHROMBIN TIME: CPT | Performed by: INTERNAL MEDICINE

## 2021-12-04 PROCEDURE — U0004 COV-19 TEST NON-CDC HGH THRU: HCPCS | Performed by: INTERNAL MEDICINE

## 2021-12-04 PROCEDURE — 84466 ASSAY OF TRANSFERRIN: CPT | Performed by: INTERNAL MEDICINE

## 2021-12-04 PROCEDURE — 99284 EMERGENCY DEPT VISIT MOD MDM: CPT

## 2021-12-04 PROCEDURE — 86901 BLOOD TYPING SEROLOGIC RH(D): CPT | Performed by: EMERGENCY MEDICINE

## 2021-12-04 PROCEDURE — 80053 COMPREHEN METABOLIC PANEL: CPT | Performed by: EMERGENCY MEDICINE

## 2021-12-04 PROCEDURE — 86923 COMPATIBILITY TEST ELECTRIC: CPT

## 2021-12-04 PROCEDURE — 85610 PROTHROMBIN TIME: CPT | Performed by: EMERGENCY MEDICINE

## 2021-12-04 PROCEDURE — 85014 HEMATOCRIT: CPT | Performed by: INTERNAL MEDICINE

## 2021-12-04 PROCEDURE — 83540 ASSAY OF IRON: CPT | Performed by: INTERNAL MEDICINE

## 2021-12-04 PROCEDURE — 99223 1ST HOSP IP/OBS HIGH 75: CPT | Performed by: NURSE PRACTITIONER

## 2021-12-04 PROCEDURE — 25010000002 PROTHROMBIN COMPLEX CONC HUMAN 500 UNITS KIT: Performed by: EMERGENCY MEDICINE

## 2021-12-04 PROCEDURE — 81001 URINALYSIS AUTO W/SCOPE: CPT

## 2021-12-04 PROCEDURE — P9016 RBC LEUKOCYTES REDUCED: HCPCS

## 2021-12-04 PROCEDURE — 36430 TRANSFUSION BLD/BLD COMPNT: CPT

## 2021-12-04 PROCEDURE — 99223 1ST HOSP IP/OBS HIGH 75: CPT | Performed by: INTERNAL MEDICINE

## 2021-12-04 PROCEDURE — 25010000002 HYDROMORPHONE PER 4 MG: Performed by: INTERNAL MEDICINE

## 2021-12-04 PROCEDURE — 86900 BLOOD TYPING SEROLOGIC ABO: CPT

## 2021-12-04 PROCEDURE — 25010000002 PROTHROMBIN COMPLEX CONC HUMAN 1000 UNITS KIT: Performed by: EMERGENCY MEDICINE

## 2021-12-04 PROCEDURE — 0 PHYTONADIONE 10 MG/ML SOLUTION 1 ML AMPULE: Performed by: EMERGENCY MEDICINE

## 2021-12-04 PROCEDURE — 86850 RBC ANTIBODY SCREEN: CPT | Performed by: EMERGENCY MEDICINE

## 2021-12-04 PROCEDURE — 86900 BLOOD TYPING SEROLOGIC ABO: CPT | Performed by: EMERGENCY MEDICINE

## 2021-12-04 PROCEDURE — U0005 INFEC AGEN DETEC AMPLI PROBE: HCPCS | Performed by: INTERNAL MEDICINE

## 2021-12-04 PROCEDURE — 85025 COMPLETE CBC W/AUTO DIFF WBC: CPT | Performed by: EMERGENCY MEDICINE

## 2021-12-04 RX ORDER — LEVOTHYROXINE SODIUM 112 UG/1
112 TABLET ORAL
Status: DISCONTINUED | OUTPATIENT
Start: 2021-12-05 | End: 2021-12-07 | Stop reason: HOSPADM

## 2021-12-04 RX ORDER — HYDROMORPHONE HYDROCHLORIDE 1 MG/ML
0.5 INJECTION, SOLUTION INTRAMUSCULAR; INTRAVENOUS; SUBCUTANEOUS
Status: DISCONTINUED | OUTPATIENT
Start: 2021-12-04 | End: 2021-12-07 | Stop reason: HOSPADM

## 2021-12-04 RX ORDER — LEVOTHYROXINE AND LIOTHYRONINE 19; 4.5 UG/1; UG/1
15 TABLET ORAL DAILY
Status: DISCONTINUED | OUTPATIENT
Start: 2021-12-05 | End: 2021-12-07 | Stop reason: HOSPADM

## 2021-12-04 RX ORDER — SODIUM CHLORIDE 0.9 % (FLUSH) 0.9 %
10 SYRINGE (ML) INJECTION AS NEEDED
Status: DISCONTINUED | OUTPATIENT
Start: 2021-12-04 | End: 2021-12-07 | Stop reason: HOSPADM

## 2021-12-04 RX ORDER — SODIUM CHLORIDE 0.9 % (FLUSH) 0.9 %
10 SYRINGE (ML) INJECTION EVERY 12 HOURS SCHEDULED
Status: DISCONTINUED | OUTPATIENT
Start: 2021-12-04 | End: 2021-12-07 | Stop reason: HOSPADM

## 2021-12-04 RX ORDER — FINASTERIDE 5 MG/1
5 TABLET, FILM COATED ORAL DAILY
Status: DISCONTINUED | OUTPATIENT
Start: 2021-12-04 | End: 2021-12-07 | Stop reason: HOSPADM

## 2021-12-04 RX ORDER — ATENOLOL 50 MG/1
50 TABLET ORAL DAILY
Status: DISCONTINUED | OUTPATIENT
Start: 2021-12-04 | End: 2021-12-07 | Stop reason: HOSPADM

## 2021-12-04 RX ORDER — PANTOPRAZOLE SODIUM 40 MG/10ML
40 INJECTION, POWDER, LYOPHILIZED, FOR SOLUTION INTRAVENOUS EVERY 12 HOURS SCHEDULED
Status: DISCONTINUED | OUTPATIENT
Start: 2021-12-04 | End: 2021-12-07 | Stop reason: HOSPADM

## 2021-12-04 RX ORDER — CIPROFLOXACIN 500 MG/1
500 TABLET, FILM COATED ORAL 2 TIMES DAILY
COMMUNITY
End: 2021-12-07 | Stop reason: HOSPADM

## 2021-12-04 RX ORDER — ISOSORBIDE MONONITRATE 60 MG/1
60 TABLET, EXTENDED RELEASE ORAL DAILY
Status: DISCONTINUED | OUTPATIENT
Start: 2021-12-04 | End: 2021-12-07 | Stop reason: HOSPADM

## 2021-12-04 RX ORDER — ROSUVASTATIN CALCIUM 20 MG/1
40 TABLET, COATED ORAL NIGHTLY
Status: DISCONTINUED | OUTPATIENT
Start: 2021-12-04 | End: 2021-12-07 | Stop reason: HOSPADM

## 2021-12-04 RX ADMIN — HYDROMORPHONE HYDROCHLORIDE 0.5 MG: 1 INJECTION, SOLUTION INTRAMUSCULAR; INTRAVENOUS; SUBCUTANEOUS at 21:36

## 2021-12-04 RX ADMIN — ATENOLOL 50 MG: 50 TABLET ORAL at 16:30

## 2021-12-04 RX ADMIN — PANTOPRAZOLE SODIUM 40 MG: 40 INJECTION, POWDER, FOR SOLUTION INTRAVENOUS at 16:30

## 2021-12-04 RX ADMIN — PHYTONADIONE 10 MG: 10 INJECTION, EMULSION INTRAMUSCULAR; INTRAVENOUS; SUBCUTANEOUS at 15:23

## 2021-12-04 RX ADMIN — SODIUM CHLORIDE, PRESERVATIVE FREE 10 ML: 5 INJECTION INTRAVENOUS at 16:30

## 2021-12-04 RX ADMIN — FINASTERIDE 5 MG: 5 TABLET, FILM COATED ORAL at 16:30

## 2021-12-04 RX ADMIN — ROSUVASTATIN CALCIUM 40 MG: 20 TABLET, COATED ORAL at 21:36

## 2021-12-04 RX ADMIN — ISOSORBIDE MONONITRATE 60 MG: 60 TABLET, EXTENDED RELEASE ORAL at 16:30

## 2021-12-04 RX ADMIN — HYDROMORPHONE HYDROCHLORIDE 0.5 MG: 1 INJECTION, SOLUTION INTRAMUSCULAR; INTRAVENOUS; SUBCUTANEOUS at 16:30

## 2021-12-04 RX ADMIN — SODIUM CHLORIDE, PRESERVATIVE FREE 10 ML: 5 INJECTION INTRAVENOUS at 21:36

## 2021-12-04 NOTE — ED PROVIDER NOTES
"Subjective   76-year-old male with extensive past medical history presents for evaluation of \"bruising to arms.\"  Of note, the patient has a history of prior DVT.  He is anticoagulated with warfarin.  He had a recent aortic aneurysm repair this fall.  He notes that a week ago he began experiencing atraumatic bruising to the flexor surface of both forearms after carrying some heavy bags.  The bruising has become more extensive over the past week, prompting his visit to the emergency department.  He endorses mild pain to his forearms as result of the bruising.  No fall or injury.  He states that he feels a bit more fatigued as of late and he typically does.  He denies any bloody stools.  He endorses compliance with his Coumadin and is unsure as to what his last INR was.          Review of Systems   Constitutional: Positive for fever.   Skin: Positive for color change.        Bruising to both forearms   Neurological: Positive for weakness.   All other systems reviewed and are negative.      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   • History of seizures as a child    • Hypertension    • Hypothyroidism    • Pneumonia    • Seizures (HCC)     History of childhood seizures.   • Tobacco abuse     Ongoing   • Type 2 diabetes mellitus (HCC)        No Known Allergies    Past Surgical History:   Procedure Laterality Date   • ABDOMINAL AORTIC ANEURYSM REPAIR WITH ENDOGRAFT N/A 9/17/2020    Procedure: ABDOMINAL AORTIC ANEURYSM REPAIR WITH ENDOGRAFT;  Surgeon: Flynn Edwards MD;  Location: [place] [id number];  Service: " Cardiothoracic;  Laterality: N/A;  FLUORO - 13 MINS 12 SECS  DOSE - 716mGy  CONTRAST - 70 ml isovue 300   • CARDIAC CATHETERIZATION  11/2004    revealed an occluded saphenous vein graft to the RCA. There was an 80% left main with an occluded LAD with a patent LIMA graft to the LAD. There was also an occluded OM1 with a patent saphenous vein graft to the OM.   • CARDIAC CATHETERIZATION  07/30/2013    with 100% RCA. Occluded SVG. With 100% LAD. Patent LINDSEY with distal LAD occlusion. Widely patent SVG to ramus and OM-1, filling other natives of the collaterals with normal LVEF.   • CARDIAC CATHETERIZATION N/A 5/4/2018    Procedure: Left Heart Cath;  Surgeon: Keegan Aguilera MD;  Location: [facility] INVASIVE LOCATION;  Service: Cardiovascular   • CARPAL TUNNEL RELEASE     • COLONOSCOPY  2018   • CORONARY ANGIOPLASTY WITH STENT PLACEMENT     • CORONARY ARTERY BYPASS GRAFT  2004    x3   • CYST REMOVAL Left     wrist x2   • KIDNEY SURGERY  2018   • POLYPECTOMY         Family History   Problem Relation Age of Onset   • No Known Problems Sister        Social History     Socioeconomic History   • Marital status:    • Number of children: 3   Tobacco Use   • Smoking status: Current Every Day Smoker     Packs/day: 1.00     Years: 54.00     Pack years: 54.00     Types: Cigarettes   • Smokeless tobacco: Never Used   • Tobacco comment: smoked about 54 years.    Vaping Use   • Vaping Use: Never used   Substance and Sexual Activity   • Alcohol use: Not Currently     Comment: Alcohol abuse, quit 1977   • Drug use: Never   • Sexual activity: Defer           Objective   Physical Exam  Vitals and nursing note reviewed.   Constitutional:       General: He is not in acute distress.     Appearance: He is well-developed. He is not diaphoretic.   HENT:      Head: Normocephalic and atraumatic.   Neck:      Vascular: No JVD.   Cardiovascular:      Rate and Rhythm: Normal rate and regular rhythm.      Heart sounds: Normal heart sounds.  No murmur heard.  No friction rub. No gallop.    Pulmonary:      Effort: Pulmonary effort is normal. No respiratory distress.      Breath sounds: Normal breath sounds. No wheezing or rales.   Abdominal:      General: Bowel sounds are normal. There is no distension.      Palpations: Abdomen is soft. There is no mass.      Tenderness: There is no abdominal tenderness. There is no guarding.      Comments: No focal abdominal tenderness or peritoneal signs, no pain out of proportion to exam   Genitourinary:     Comments: Fecal occult blood test is positive, no bright red blood or gross blood noted on digital rectal exam  Musculoskeletal:         General: Normal range of motion.   Skin:     Coloration: Skin is pale.      Findings: No erythema or rash.      Comments: Bruising noted to flexor surface of both forearms, no pain out of proportion to exam, no crepitus   Neurological:      General: No focal deficit present.      Mental Status: He is alert and oriented to person, place, and time.      Comments: Neurovascularly intact distally in both upper extremities with bounding distal pulses and normal sensation, normal  strength noted, normal range of motion of both elbows   Psychiatric:         Mood and Affect: Mood normal.         Thought Content: Thought content normal.         Judgment: Judgment normal.         Critical Care  Performed by: Gary Norris MD  Authorized by: Gary Norris MD     Critical care provider statement:     Critical care time (minutes):  37    Critical care was necessary to treat or prevent imminent or life-threatening deterioration of the following conditions:  Circulatory failure    Critical care was time spent personally by me on the following activities:  Development of treatment plan with patient or surrogate, evaluation of patient's response to treatment, examination of patient, obtaining history from patient or surrogate, ordering and performing treatments and  interventions, ordering and review of radiographic studies, ordering and review of laboratory studies, pulse oximetry, re-evaluation of patient's condition and review of old charts               ED Course  ED Course as of 12/04/21 1533   Sat Dec 04, 2021   1212 76-year-old male, anticoagulated on warfarin, presents for evaluation of atraumatic bruising to the flexor surface of both forearms for the past week. [DD]   1214 On arrival to the ED, the patient is nontoxic-appearing.  He denies any preceding trauma or injury and notes that the bruising started after lifting some heavy bags.  Normal and painless range of motion.  Normal strength.  Neurovascularly intact.  We will obtain labs, and we will reassess following initial interventions. [DD]   1315 Initial lab work revealed a supratherapeutic INR of greater than 10.  We will redraw to confirm. [DD]   1334 The patient is also anemic with a hemoglobin of 5.7.  Patient typed and screened.  We will transfuse 2 units of packed red blood cells.  Reversal initiated for INR greater than 10. [DD]   1341 Fecal occult blood test is positive. Awaiting callback from the hospitalist at this time. [DD]   1350 I discussed the patient's case with Dr. Prado, and the patient will be admitted under his care for further evaluation and treatment. The patient is aware/agreeable with the plan at this time. [DD]      ED Course User Index  [DD] Gary Norris MD                                         Recent Results (from the past 24 hour(s))   Protime-INR    Collection Time: 12/04/21 12:19 PM    Specimen: Blood   Result Value Ref Range    Protime >120.0 (C) 11.4 - 14.4 Seconds    INR >10.00 (C) 0.85 - 1.16   CBC Auto Differential    Collection Time: 12/04/21 12:19 PM    Specimen: Blood   Result Value Ref Range    WBC 8.17 3.40 - 10.80 10*3/mm3    RBC 2.07 (L) 4.14 - 5.80 10*6/mm3    Hemoglobin 5.7 (C) 13.0 - 17.7 g/dL    Hematocrit 17.8 (C) 37.5 - 51.0 %    MCV 86.0 79.0 - 97.0 fL     MCH 27.5 26.6 - 33.0 pg    MCHC 32.0 31.5 - 35.7 g/dL    RDW 17.5 (H) 12.3 - 15.4 %    RDW-SD 51.8 37.0 - 54.0 fl    MPV 9.1 6.0 - 12.0 fL    Platelets 314 140 - 450 10*3/mm3    Neutrophil % 74.8 42.7 - 76.0 %    Lymphocyte % 14.6 (L) 19.6 - 45.3 %    Monocyte % 7.2 5.0 - 12.0 %    Eosinophil % 0.5 0.3 - 6.2 %    Basophil % 0.2 0.0 - 1.5 %    Immature Grans % 2.7 (H) 0.0 - 0.5 %    Neutrophils, Absolute 6.11 1.70 - 7.00 10*3/mm3    Lymphocytes, Absolute 1.19 0.70 - 3.10 10*3/mm3    Monocytes, Absolute 0.59 0.10 - 0.90 10*3/mm3    Eosinophils, Absolute 0.04 0.00 - 0.40 10*3/mm3    Basophils, Absolute 0.02 0.00 - 0.20 10*3/mm3    Immature Grans, Absolute 0.22 (H) 0.00 - 0.05 10*3/mm3    nRBC 2.2 (H) 0.0 - 0.2 /100 WBC   Reticulocytes    Collection Time: 12/04/21 12:19 PM    Specimen: Blood   Result Value Ref Range    Reticulocyte % 6.93 (H) 0.70 - 1.90 %    Reticulocyte Absolute 0.1441 (H) 0.0200 - 0.1300 10*6/mm3   Protime-INR    Collection Time: 12/04/21  1:49 PM    Specimen: Blood   Result Value Ref Range    Protime >120.0 (C) 11.4 - 14.4 Seconds    INR >10.00 (C) 0.85 - 1.16   Type & Screen    Collection Time: 12/04/21  1:49 PM    Specimen: Blood   Result Value Ref Range    ABO Type B     RH type Positive     Antibody Screen Negative     T&S Expiration Date 12/7/2021 11:59:59 PM    Basic Metabolic Panel    Collection Time: 12/04/21  1:49 PM    Specimen: Blood   Result Value Ref Range    Glucose 280 (H) 65 - 99 mg/dL    BUN 23 8 - 23 mg/dL    Creatinine 1.56 (H) 0.76 - 1.27 mg/dL    Sodium 129 (L) 136 - 145 mmol/L    Potassium 4.2 3.5 - 5.2 mmol/L    Chloride 94 (L) 98 - 107 mmol/L    CO2 23.0 22.0 - 29.0 mmol/L    Calcium 8.1 (L) 8.6 - 10.5 mg/dL    eGFR Non African Amer 44 (L) >60 mL/min/1.73    BUN/Creatinine Ratio 14.7 7.0 - 25.0    Anion Gap 12.0 5.0 - 15.0 mmol/L   Iron Profile    Collection Time: 12/04/21  1:49 PM    Specimen: Blood   Result Value Ref Range    Iron 96 59 - 158 mcg/dL    Iron Saturation 28  20 - 50 %    Transferrin 233 200 - 360 mg/dL    TIBC 347 298 - 536 mcg/dL   Comprehensive Metabolic Panel    Collection Time: 12/04/21  1:49 PM    Specimen: Blood   Result Value Ref Range    Glucose 288 (H) 65 - 99 mg/dL    BUN 23 8 - 23 mg/dL    Creatinine 1.57 (H) 0.76 - 1.27 mg/dL    Sodium 130 (L) 136 - 145 mmol/L    Potassium 4.2 3.5 - 5.2 mmol/L    Chloride 95 (L) 98 - 107 mmol/L    CO2 22.0 22.0 - 29.0 mmol/L    Calcium 8.4 (L) 8.6 - 10.5 mg/dL    Total Protein 6.2 6.0 - 8.5 g/dL    Albumin 3.30 (L) 3.50 - 5.20 g/dL    ALT (SGPT) 15 1 - 41 U/L    AST (SGOT) 20 1 - 40 U/L    Alkaline Phosphatase 44 39 - 117 U/L    Total Bilirubin 0.5 0.0 - 1.2 mg/dL    eGFR Non African Amer 43 (L) >60 mL/min/1.73    Globulin 2.9 gm/dL    A/G Ratio 1.1 g/dL    BUN/Creatinine Ratio 14.6 7.0 - 25.0    Anion Gap 13.0 5.0 - 15.0 mmol/L   Prepare RBC, 2 Units    Collection Time: 12/04/21  2:48 PM   Result Value Ref Range    Product Code K3140B98     Unit Number [id number]     UNIT  ABO B     UNIT  RH POS     Crossmatch Interpretation Compatible     Dispense Status XM     Blood Expiration Date [id number]     Blood Type Barcode 7300     Product Code B6791D22     Unit Number [id number]     UNIT  ABO B     UNIT  RH POS     Crossmatch Interpretation Compatible     Dispense Status XM     Blood Expiration Date [id number]     Blood Type Barcode 7300      Note: In addition to lab results from this visit, the labs listed above may include labs taken at another facility or during a different encounter within the last 24 hours. Please correlate lab times with ED admission and discharge times for further clarification of the services performed during this visit.    No orders to display     Vitals:    12/04/21 1245 12/04/21 1300 12/04/21 1315 12/04/21 1430   BP:       BP Location:       Patient Position:       Pulse: 89 87 97 94   Resp:       Temp:       SpO2: 97% 97% 100% 100%   Weight:       Height:         Medications    prothrombin complex conc human (KCentra) IV solution 3,868 Units (3,868 Units Intravenous Given 12/4/21 1500)     And   phytonadione (AQUA-MEPHYTON, VITAMIN K) 10 mg in sodium chloride 0.9 % 50 mL IVPB (10 mg Intravenous New Bag 12/4/21 1523)   HYDROmorphone (DILAUDID) injection 0.5 mg (has no administration in time range)     ECG/EMG Results (last 24 hours)     ** No results found for the last 24 hours. **        No orders to display               MDM    Final diagnoses:   Anemia, unspecified type   Supratherapeutic INR   Gastrointestinal hemorrhage with melena   Hyperglycemia   Renal insufficiency       ED Disposition  ED Disposition     ED Disposition Condition Comment    Decision to Admit  Level of Care: Telemetry [5]   Diagnosis: Anemia [458943]   Admitting Physician: MARK FULLER [[id number]]   Attending Physician: MARK FULLER [[id number]]   Certification: I Certify That Inpatient Hospital Services Are Medically Necessary For Greater Than 2 Midnights            No follow-up provider specified.       Medication List      No changes were made to your prescriptions during this visit.          Gary Norris MD  12/04/21 9289

## 2021-12-04 NOTE — PLAN OF CARE
Goal Outcome Evaluation:  Pt. Was received from ED via stretcher around 5pm. Pt was able to stand and move to bed with standby assist. Pt. C/o left arm pain, resolved with dilaudid. Pt states he noticed some bleeding in his urine. Voided tea colored urine in his urinal. PRBC started and tolerating well. Pt aware of NPO status.

## 2021-12-04 NOTE — H&P
[facility] Medicine Services  HISTORY AND PHYSICAL    Patient Name: Cal Frausto  : 1945  MRN: [id number]  Primary Care Physician: Harsha Smith MD  Date of admission: 2021      Subjective   Subjective     Chief Complaint:  My left arm is swollen, blue and tender for 1 week    HPI:  Cal Frausto is a 76 y.o. male patient with past medical history of coronary artery disease status post CABG x3 in , history of left lower extremity DVT in  on anticoagulation with Coumadin, essential hypertension, dyslipidemia, AAA status post endovascular repair in , type 2 diabetes, ongoing tobacco use, renal cell carcinoma status post left nephrectomy , COPD on nocturnal oxygen, arthritis, hypothyroidism who presented to the hospital today brought by his daughter for concern of left arm swelling, blue discoloration and tenderness for 1 week.  His left arm was associated with other symptoms, the analysis of their HPI is as follows    · Left arm swelling, blue discoloration and tenderness for 1 week: The patient performed heavy activity a week ago by lifting 10 to 15 pounds which resulted in sudden onset of severe pain in his left arm and forearm, after which he rested his arm on the table and start noticing progressive discoloration over the course of the day that had been getting worse on the following days.  Touching his arm is very tender.  When his daughter saw that his arm is so swollen and discolored, she brought or to the ER.  Partial loss of control because of pain  · Hematuria: The patient was diagnosed with UTI by his family doctor and was started on antibiotic therapy (does not recall the name of the antibiotic and not mentioned in his medication reconciliation) twice daily for total of 2 weeks.  He started having hematuria that started this pinkish discoloration of his urine a week ago and progressed to deep red discoloration and currently resolved for 2  days now.  He thought it is related to UTI and did not seek medical advice for it.  He reported that he had cystoscopy 3 months ago with his urologist and was told that it was negative for any bladder cancer or pathology.  · Dark stool/melena: Patient reported that he has been having dark bowel movements for the 2-3 week or so that he attributed to the antibiotic therapy that he took for his pneumonia that was diagnosed mid November.  Denied any active bleeding or blood clots per rectum.  Denied any hematemesis.  Denied any abdominal pain.  No nausea or vomiting or diarrhea.  · Shortness of breath and low energy: The patient noted worsening shortness of breath particularly with activity over the last 3 to 4 days associated with generalized fatigue and low energy.  He thought it is related to UTI.    Of note, the patient reported that he has been having difficulty to get his INR therapeutic between 2-3.   almost each time he will go to his family doctor his warfarin dose will be adjusted because his INR might be subtherapeutic or supratherapeutic.    In ER: Patient was found to be severely anemic with hemoglobin of 5.7.  INR more than 10.  Patient received Kcentra and 1 dose of vitamin K and will be admitted to the hospital service for further management.    COVID Details:    Symptoms:    [x] NONE [] Fever []  Cough [] Shortness of breath [] Change in taste/smell      The patient qualifies to receive the vaccine, but they have not yet received it.      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   • History of seizures as a child    • Hypertension    • Hypothyroidism    • Pneumonia    • Seizures (HCC)     History of childhood seizures.   • Tobacco abuse     Ongoing   • Type 2 diabetes mellitus (HCC)        Past Surgical History:   Procedure Laterality Date   • ABDOMINAL AORTIC ANEURYSM REPAIR WITH ENDOGRAFT N/A 9/17/2020    Procedure: ABDOMINAL AORTIC ANEURYSM REPAIR WITH ENDOGRAFT;  Surgeon: Flynn Edwards MD;  Location: [facility] HYBRID OR 15;  Service: Cardiothoracic;  Laterality: N/A;  FLUORO - 13 MINS 12 SECS  DOSE - 716mGy  CONTRAST - 70 ml isovue 300   • CARDIAC CATHETERIZATION  11/2004    revealed an occluded saphenous vein graft to the RCA. There was an 80% left main with an occluded LAD with a patent LIMA graft to the LAD. There was also an occluded OM1 with a patent saphenous vein graft to the OM.   • CARDIAC CATHETERIZATION  07/30/2013    with 100% RCA. Occluded SVG. With 100% LAD. Patent LINDSEY with distal LAD occlusion. Widely patent SVG to ramus and OM-1, filling other natives of the collaterals with normal LVEF.   • CARDIAC CATHETERIZATION N/A 5/4/2018    Procedure: Left Heart Cath;  Surgeon: Keegan Aguilera MD;  Location: [facility] CATH INVASIVE LOCATION;  Service: Cardiovascular   • CARPAL TUNNEL RELEASE     • COLONOSCOPY  2018   • CORONARY ANGIOPLASTY WITH STENT PLACEMENT     • CORONARY ARTERY BYPASS GRAFT  2004    x3   • CYST REMOVAL Left     wrist x2   • KIDNEY SURGERY  2018   • POLYPECTOMY         Family History:  family history includes No Known Problems in his sister. Otherwise pertinent FHx was reviewed and unremarkable.     Social History:  reports that he has been smoking cigarettes. He has a 54.00 pack-year smoking history. He has never used smokeless tobacco. He reports previous alcohol use. He reports that he does not use drugs.  Social History     Social History Narrative    Lives in  [place], [place] alone     Medications:  Available home medication information reviewed.  (Not in a hospital admission)    No Known Allergies    Objective   Objective     Vital Signs:   Temp:  [98.4 °F (36.9 °C)] 98.4 °F (36.9 °C)  Heart Rate:  [] 97  Resp:  [16] 16  BP: (148)/(65) 148/65       Physical Exam   General: Comfortable, not in any acute distress, appears stated age, conversant and cooperative  Head: Atraumatic and normocephalic, without obvious abnormality  Eyes:   Conjunctivae and sclerae normal, no Icterus.  Severe pallor  Ears:  Ears appear intact with no abnormalities noted  Throat: No oral lesions, no thrush, oral mucosa moist  Neck: Supple, trachea midline, no thyromegaly  Back:   No kyphoscoliosis present. No tenderness to palpation,   no sacral edema  Lungs: Clear to auscultation bilaterally, equal air entry, no wheezing or crackles  Heart:  Normal S1 and S2, no murmur, no gallop, No JVD, no lower extremity swelling  Abdomen:  Soft, no tenderness, no organomegaly, normal bowel sounds  Normal bowel sounds, no masses, no organomegaly. Soft, nontender, nondistended, no guarding, no rebound tenderness.  Extremities: No gross abnormalities, no clubbing, pulses palpable and equal bilaterally  Skin: Severe ecchymosis along the lateral aspect of the left forearm and arm with tenderness to palpation.  Neurologic: Cranial nerves appear intact with no evidence of facial asymmetry, normal motor and sensory functions in all 4 extremities  Psych: Alert and oriented x 3, normal mood    Result Review:  I have personally reviewed the results from the time of this admission to 12/4/2021 14:06 EST and agree with these findings:  [x]  Laboratory  []  Microbiology  []  Radiology  []  EKG/Telemetry   []  Cardiology/Vascular   []  Pathology  []  Old records  []  Other:  Most notable findings include:       LAB RESULTS:      Lab 12/04/21  1219   WBC 8.17   HEMOGLOBIN 5.7*   HEMATOCRIT 17.8*   PLATELETS 314   NEUTROS  ABS 6.11   IMMATURE GRANS (ABS) 0.22*   LYMPHS ABS 1.19   MONOS ABS 0.59   EOS ABS 0.04   MCV 86.0   PROTIME >120.0*   INR >10.00*               Microbiology Results (last 10 days)     ** No results found for the last 240 hours. **          No radiology results from the last 24 hrs        Assessment/Plan   Assessment & Plan     Active Hospital Problems    Diagnosis  POA   • Anemia [D64.9]  Yes     Summary:  Cal Frausto is a 76 y.o. male patient with past medical history of coronary artery disease status post CABG x3 in 2004, history of left lower extremity DVT in 2016 on anticoagulation with Coumadin, essential hypertension, dyslipidemia, AAA status post endovascular repair in 2020, type 2 diabetes, ongoing tobacco use, renal cell carcinoma status post left nephrectomy 2019, COPD on nocturnal oxygen, arthritis, hypothyroidism who presented to the hospital today brought by his daughter for concern of left arm swelling, blue discoloration and tenderness for 1 week.  He was found to be severely anemic with hemoglobin 5.7 and with INR more than 10    Assessment and plan:  Acute blood loss anemia  Severe symptomatic anemia  Concern for GI bleed  · Hemodynamically stable.  Can be managed on telemetry floor  · Patient presented with severe symptomatic anemia  · Hemoglobin was found to be 5.7.  Baseline 14 a year ago  · Has been having melena and INR is supratherapeutic > 10  · Received Kcentra and 1 dose of IV vitamin K in the ER   · Hold Coumadin.  Consult pharmacy to dose once INR is controlled and any possible source of bleeding is ruled out  · Transfused units of blood  · Anemia work-up in the form of iron studies, vitamin B12 and reticulocyte count sent prior to blood transfusion  · GI consultation to evaluate the patient for possible GI source of blood loss  · IV Protonix twice daily   · Serial H&H  · N.p.o. in case he needs any emergency intervention    History of hematuria  History of UTI  · Patient was recently  treated for urine tract infection with antibiotic that he does not recall its name and not mentioned in his medication reconciliation  · Currently with no urinary symptoms  · Obtain urine analysis and hold off any antibiotic therapy for now  · He also reported history of hematuria that currently resolved  · The patient mentioned that he had cystoscopy done 3 months ago with his urologist after part of his routine follow-up after his nephrectomy for left renal nephrectomy cancer and according to him it was negative study.  · Monitor urine for any hematuria.  Low threshold to consult urology if you start having gross hematuria    Left arm and forearm ecchymosis and tenderness  · IV analgesia as needed  · Cool compresses    Coronary disease status post CABG  History of left lower extremity DVT in 2016  Essential hypertension   Dyslipidemia  COPD, on nocturnal oxygen  Hypothyroidism  · All those medical conditions are chronic and stable, resume home meds of applicable    DVT prophylaxis: SCD    CODE STATUS:  *Full code  There are no questions and answers to display.     Admission Status: INPATIENT status due to severe symptomatic anemia with concern for GI bleed and supratherapeutic NR. I feel patient´s risk for adverse outcomes and need for care warrant INPATIENT evaluation and I predict the patient´s care encounter   to likely last beyond 2 midnights.    Jose Worley MD  12/04/21

## 2021-12-04 NOTE — PROGRESS NOTES
"Pharmacy Consult  -  Warfarin    Cal Frausto is a  76 y.o. male   Height - 167.6 cm (66\")  Weight - 74.8 kg (165 lb)    Consulting Provider: Hospitalist  Indication: history of LLE DVT (2016)  Goal INR: 2-3  Home Regimen: has not had stable home dose; reports most recent regimen as warfarin 5mg daily    Bridge Therapy: N    Drug-Drug Interactions with current regimen:      Warfarin Dosing During Admission:    Date  12/4           INR  >10 x2           Dose  HOLD                Education Provided:    Discharge Follow up:   Following Provider -   Follow up time range or appointment: 2-3 days following discharge      Labs:    Results from last 7 days   Lab Units 12/04/21  1349 12/04/21  1219   INR  >10.00* >10.00*   HEMOGLOBIN g/dL  --  5.7*   HEMATOCRIT %  --  17.8*     Results from last 7 days   Lab Units 12/04/21  1349   SODIUM mmol/L 130*  129*   POTASSIUM mmol/L 4.2  4.2   CHLORIDE mmol/L 95*  94*   CO2 mmol/L 22.0  23.0   BUN mg/dL 23  23   CREATININE mg/dL 1.57*  1.56*   CALCIUM mg/dL 8.4*  8.1*   BILIRUBIN mg/dL 0.5   ALK PHOS U/L 44   ALT (SGPT) U/L 15   AST (SGOT) U/L 20   GLUCOSE mg/dL 288*  280*       Current dietary intake: new admission      Assessment/Plan:   Pharmacy to dose warfarin for history of LLE DVT. Goal INR 2-3.  Supratherapeutic INR of >10 on admission today (confirmed with second lab draw). Appears patient was recently treated with ciprofloxacin for UTI (major DDI, increase INR). Admitted with symptomatic anemia, concern for GIB. Now s/p Kcentra and vitamin K 10mg IV x1 on 12/4.  HOLD warfarin  Daily PT/INR ordered.  Pharmacy will continue to monitor signs/symptoms of bleeding, dietary intake, and drug-drug interactions and make dose adjustments as necessary.    Holger Hubbard, PharmD  12/4/2021  17:00 EST      "

## 2021-12-04 NOTE — CONSULTS
[facility] [place] [facility]ology Consult    Referring Provider: No ref. provider found    PCP: Harsha Smith MD    Reason for Consultation: Blood loss anemia, concern for GI bleed    Chief complaint: Worsening weakness and fatigue    History of present illness:    Cal Frausto is a 76 y.o. male who is admitted with worsening arm bruising and edema for approximately 1 week.  Upon admission, patient found to be profoundly anemic with initial hemoglobin of 5.7, down from his baseline of 14.  Patient also reports at time of admission that he has been having melanotic stools for the past couple of weeks but did not think anything of it at the time.  Patient recalls a several week long onset of progressive functional decline with worsening weakness, fatigue, exertional dyspnea, and change in functional status.  Does not endorse any nausea, vomiting, abdominal pain, chest pain, fever/chills, or sick contacts.  Patient shows examiner a large bruise on his left upper extremity that occurred after pinning his arm and a piece of metal while working at his house that is tender to touch.  Patient does not endorse any use of NSAIDs and is anticoagulated on warfarin; found to be supratherapeutic with INR greater than 10 at time of exam.  Does not endorse any prior history of gastrointestinal bleeding or ulcers.  Notes he is up-to-date on his colonoscopy.Past medical, surgical, social, and family histories are reviewed for accuracy.  No documented alleviating or exacerbating factors.  Does not endorse pain at time of exam.    Allergies:  Patient has no known allergies.    Scheduled Meds:  atenolol, 50 mg, Oral, Daily  finasteride, 5 mg, Oral, Daily  isosorbide mononitrate, 60 mg, Oral, Daily  [START ON 12/5/2021] levothyroxine, 112 mcg, Oral, Q AM  O2, 2 L/min, Inhalation, Nightly  pantoprazole, 40 mg, Intravenous, Q12H  rosuvastatin, 40 mg, Oral, Nightly  sodium chloride, 10 mL, Intravenous, Q12H  [START ON 12/5/2021] thyroid, 15 mg,  Oral, Daily  warfarin (COUMADIN) (dosing per levels), , Does not apply, Daily         Infusions:  Pharmacy to dose warfarin,         PRN Meds:  HYDROmorphone  •  Pharmacy to dose warfarin  •  sodium chloride    Home Meds:  Medications Prior to Admission   Medication Sig Dispense Refill Last Dose   • ciprofloxacin (CIPRO) 500 MG tablet Take 500 mg by mouth 2 (Two) Times a Day.      • aspirin 81 MG tablet Take 81 mg by mouth daily.      • atenolol (TENORMIN) 50 MG tablet Take 50 mg by mouth daily.      • dapagliflozin (Farxiga) 5 MG tablet tablet Take 5 mg by mouth Daily.      • fenofibrate 160 MG tablet Take 160 mg by mouth daily.      • finasteride (PROSCAR) 5 MG tablet Take 5 mg by mouth Daily.      • isosorbide mononitrate (IMDUR) 60 MG 24 hr tablet Take 60 mg by mouth daily.      • levothyroxine (SYNTHROID, LEVOTHROID) 112 MCG tablet Take 112 mcg by mouth daily.      • losartan-hydrochlorothiazide (HYZAAR) 100-25 MG per tablet Take 0.5 tablets by mouth Daily.      • nitroglycerin (NITROSTAT) 0.4 MG SL tablet 1 under the tongue as needed for angina, may repeat q5mins for up three doses (Patient taking differently: Place 0.4 mg under the tongue Every 5 (Five) Minutes As Needed. 1 under the tongue as needed for angina, may repeat q5mins for up three doses) 25 tablet 3    • O2 (OXYGEN) Inhale 2 L/min Every Night.      • rosuvastatin (CRESTOR) 40 MG tablet Take 1 tablet by mouth Every Night. 90 tablet 3    • SITagliptin (Januvia) 100 MG tablet Take 100 mg by mouth Daily.      • thyroid (ARMOUR) 15 MG tablet Take 15 mg by mouth daily.      • warfarin (COUMADIN) 3 MG tablet Take 5 mg by mouth Daily. Last dose 9-12-20 per Dr. Edwards's orders      • warfarin (COUMADIN) 4 MG tablet Alternating dosages; last dose 9-12-20 per Dr. Edwards's orders          ROS: Review of Systems   Constitutional: Positive for activity change, appetite change and fatigue. Negative for chills, diaphoresis, fever and unexpected weight change.    HENT: Negative for sore throat, trouble swallowing and voice change.    Respiratory: Positive for shortness of breath. Negative for apnea, cough, choking, chest tightness, wheezing and stridor.    Cardiovascular: Negative for chest pain, palpitations and leg swelling.   Gastrointestinal: Positive for blood in stool. Negative for abdominal distention, abdominal pain, anal bleeding, constipation, diarrhea, nausea, rectal pain and vomiting.   Endocrine: Negative.    Genitourinary: Negative.    Musculoskeletal: Negative.    Skin: Positive for pallor.        +bruising   Allergic/Immunologic: Negative.    Neurological: Negative.    Hematological: Negative for adenopathy. Bruises/bleeds easily.   All other systems reviewed and are negative.      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   • History of seizures as a child    • Hypertension    • Hypothyroidism    • Pneumonia    • Seizures (HCC)     History of childhood seizures.   • Tobacco abuse     Ongoing   • Type 2 diabetes mellitus (HCC)        PAST SURG HX:  Past Surgical History:   Procedure Laterality Date   • ABDOMINAL AORTIC ANEURYSM REPAIR WITH ENDOGRAFT N/A 9/17/2020    Procedure: ABDOMINAL AORTIC ANEURYSM REPAIR WITH ENDOGRAFT;  Surgeon: Flynn Edwards MD;  Location: [place] [address];  Service: Cardiothoracic;  Laterality: N/A;  FLUORO - 13 MINS 12 SECS  DOSE - 716mGy  CONTRAST - 70 ml isovue 300   • CARDIAC CATHETERIZATION  11/2004    revealed an occluded saphenous vein graft to the RCA. There was an 80% left main with an occluded LAD with a  "patent LIMA graft to the LAD. There was also an occluded OM1 with a patent saphenous vein graft to the OM.   • CARDIAC CATHETERIZATION  07/30/2013    with 100% RCA. Occluded SVG. With 100% LAD. Patent LINDSEY with distal LAD occlusion. Widely patent SVG to ramus and OM-1, filling other natives of the collaterals with normal LVEF.   • CARDIAC CATHETERIZATION N/A 5/4/2018    Procedure: Left Heart Cath;  Surgeon: Keegan Aguilera MD;  Location: [facility] CATH INVASIVE LOCATION;  Service: Cardiovascular   • CARPAL TUNNEL RELEASE     • COLONOSCOPY  2018   • CORONARY ANGIOPLASTY WITH STENT PLACEMENT     • CORONARY ARTERY BYPASS GRAFT  2004    x3   • CYST REMOVAL Left     wrist x2   • KIDNEY SURGERY  2018   • POLYPECTOMY         FAM HX:  Family History   Problem Relation Age of Onset   • No Known Problems Sister        SOC HX:  Social History     Socioeconomic History   • Marital status:    • Number of children: 3   Tobacco Use   • Smoking status: Current Every Day Smoker     Packs/day: 1.00     Years: 54.00     Pack years: 54.00     Types: Cigarettes   • Smokeless tobacco: Never Used   • Tobacco comment: smoked about 54 years.    Vaping Use   • Vaping Use: Never used   Substance and Sexual Activity   • Alcohol use: Not Currently     Comment: Alcohol abuse, quit 1977   • Drug use: Never   • Sexual activity: Defer       PHYSICAL EXAM  /59   Pulse 75   Temp 99.3 °F (37.4 °C) (Oral)   Resp 18   Ht 167.6 cm (66\")   Wt 74.8 kg (165 lb)   SpO2 97%   BMI 26.63 kg/m²   Wt Readings from Last 3 Encounters:   12/04/21 74.8 kg (165 lb)   11/15/21 80.9 kg (178 lb 6.4 oz)   10/19/21 84.8 kg (187 lb)   ,body mass index is 26.63 kg/m².  Physical Exam  Vitals and nursing note reviewed.   Constitutional:       General: He is not in acute distress.     Appearance: Normal appearance. He is normal weight. He is not ill-appearing or toxic-appearing.   HENT:      Head: Normocephalic and atraumatic.      Mouth/Throat:      Mouth: " Mucous membranes are moist.      Pharynx: Oropharynx is clear. No oropharyngeal exudate.   Eyes:      General: No scleral icterus.     Extraocular Movements: Extraocular movements intact.      Conjunctiva/sclera: Conjunctivae normal.      Pupils: Pupils are equal, round, and reactive to light.   Cardiovascular:      Rate and Rhythm: Normal rate and regular rhythm.      Pulses: Normal pulses.      Heart sounds: Normal heart sounds.   Pulmonary:      Effort: Pulmonary effort is normal. No respiratory distress.      Breath sounds: Normal breath sounds.   Abdominal:      General: Abdomen is flat. Bowel sounds are normal. There is no distension.      Palpations: Abdomen is soft. There is no mass.      Tenderness: There is no abdominal tenderness. There is no guarding or rebound.      Hernia: No hernia is present.   Genitourinary:     Rectum: Guaiac result positive.   Musculoskeletal:         General: Normal range of motion.      Cervical back: Normal range of motion and neck supple. No rigidity or tenderness.      Right lower leg: No edema.      Left lower leg: No edema.   Lymphadenopathy:      Cervical: No cervical adenopathy.   Skin:     General: Skin is warm and dry.      Capillary Refill: Capillary refill takes less than 2 seconds.      Coloration: Skin is pale.      Findings: Bruising present.      Comments: Large left forearm bruise   Neurological:      General: No focal deficit present.      Mental Status: He is alert and oriented to person, place, and time.   Psychiatric:         Mood and Affect: Mood normal.         Behavior: Behavior normal.         Thought Content: Thought content normal.         Judgment: Judgment normal.         Results Review:   I reviewed the patient's new clinical results.  I reviewed the patient's new imaging results and agree with the interpretation.  I personally viewed and interpreted the patient's EKG/Telemetry data    Lab Results   Component Value Date    WBC 8.17 12/04/2021    HGB  5.5 (C) 12/04/2021    HGB 5.7 (C) 12/04/2021    HGB 14.6 09/18/2020    HCT 17.7 (C) 12/04/2021    MCV 86.0 12/04/2021     12/04/2021       Lab Results   Component Value Date    INR 1.31 (H) 12/04/2021    INR >10.00 (C) 12/04/2021    INR >10.00 (C) 12/04/2021       Lab Results   Component Value Date    GLUCOSE 280 (H) 12/04/2021    GLUCOSE 288 (H) 12/04/2021    BUN 23 12/04/2021    BUN 23 12/04/2021    CREATININE 1.56 (H) 12/04/2021    CREATININE 1.57 (H) 12/04/2021    EGFRIFNONA 44 (L) 12/04/2021    EGFRIFNONA 43 (L) 12/04/2021    BCR 14.7 12/04/2021    BCR 14.6 12/04/2021     (L) 12/04/2021     (L) 12/04/2021    K 4.2 12/04/2021    K 4.2 12/04/2021    CO2 23.0 12/04/2021    CO2 22.0 12/04/2021    CALCIUM 8.1 (L) 12/04/2021    CALCIUM 8.4 (L) 12/04/2021    ALBUMIN 3.30 (L) 12/04/2021    ALKPHOS 44 12/04/2021    BILITOT 0.5 12/04/2021    ALT 15 12/04/2021    AST 20 12/04/2021     No results found.    SARS-CoV-2 TORIE   Date Value Ref Range Status   09/15/2020 Not Detected Not Detected Final     ASSESSMENTS/PLANS  1.  Acute gastrointestinal bleeding with melena  2.  Acute blood loss anemia  3.  Supratherapeutic INR  4.  Recent hematuria  5.  Left arm hematoma.  6.  Coronary artery disease, status post CABG    Cal A High is a 76 y.o. male admitted to hospital with worsening arm pain and found to have acute blood loss anemia passing melanotic stools.  Patient has baseline hemoglobin is roughly 14 but at time of admission has decreased to 5.5.  Additionally, patient's INR is supratherapeutic at 10+.  Iron stores are also appropriate indicating an acute cause of bleed.  Patient will need EGD when INR within appropriate ranges.  Until such point, conservative management with transfusions and further corrective actions for the INR.  We will make n.p.o. at midnight in event INR will allow EGD.    >>> Agree with plans to transfuse 2 units PRBCs.   -Continue to monitor H&H and transfuse for hemoglobins  less than 7 or symptomatic anemia per protocol.  >>> IV PPI twice daily  >>> N.p.o. at midnight should INR correct and allow EGD; we will tentatively place orders for EGD..  >>> Continue correction of supratherapeutic INR per primary team.    I discussed the patient's findings and my recommendations with patient, family and nursing staff    PEGGY Moe  12/04/21  18:42 EST

## 2021-12-05 ENCOUNTER — ANESTHESIA (OUTPATIENT)
Dept: GASTROENTEROLOGY | Facility: HOSPITAL | Age: 76
End: 2021-12-05

## 2021-12-05 ENCOUNTER — ANESTHESIA EVENT (OUTPATIENT)
Dept: GASTROENTEROLOGY | Facility: HOSPITAL | Age: 76
End: 2021-12-05

## 2021-12-05 LAB
ALBUMIN SERPL-MCNC: 2.7 G/DL (ref 3.5–5.2)
ALBUMIN/GLOB SERPL: 1.5 G/DL
ALP SERPL-CCNC: 36 U/L (ref 39–117)
ALT SERPL W P-5'-P-CCNC: 9 U/L (ref 1–41)
ANION GAP SERPL CALCULATED.3IONS-SCNC: 6 MMOL/L (ref 5–15)
AST SERPL-CCNC: 15 U/L (ref 1–40)
BASOPHILS # BLD AUTO: 0.03 10*3/MM3 (ref 0–0.2)
BASOPHILS NFR BLD AUTO: 0.5 % (ref 0–1.5)
BH BB BLOOD EXPIRATION DATE: NORMAL
BH BB BLOOD EXPIRATION DATE: NORMAL
BH BB BLOOD TYPE BARCODE: 7300
BH BB BLOOD TYPE BARCODE: 7300
BH BB DISPENSE STATUS: NORMAL
BH BB DISPENSE STATUS: NORMAL
BH BB PRODUCT CODE: NORMAL
BH BB PRODUCT CODE: NORMAL
BH BB UNIT NUMBER: NORMAL
BH BB UNIT NUMBER: NORMAL
BILIRUB SERPL-MCNC: 0.9 MG/DL (ref 0–1.2)
BUN SERPL-MCNC: 20 MG/DL (ref 8–23)
BUN/CREAT SERPL: 13.2 (ref 7–25)
CALCIUM SPEC-SCNC: 7.5 MG/DL (ref 8.6–10.5)
CHLORIDE SERPL-SCNC: 101 MMOL/L (ref 98–107)
CO2 SERPL-SCNC: 24 MMOL/L (ref 22–29)
CREAT SERPL-MCNC: 1.51 MG/DL (ref 0.76–1.27)
CROSSMATCH INTERPRETATION: NORMAL
CROSSMATCH INTERPRETATION: NORMAL
DEPRECATED RDW RBC AUTO: 51.7 FL (ref 37–54)
EOSINOPHIL # BLD AUTO: 0.14 10*3/MM3 (ref 0–0.4)
EOSINOPHIL NFR BLD AUTO: 2.2 % (ref 0.3–6.2)
ERYTHROCYTE [DISTWIDTH] IN BLOOD BY AUTOMATED COUNT: 17.1 % (ref 12.3–15.4)
GFR SERPL CREATININE-BSD FRML MDRD: 45 ML/MIN/1.73
GLOBULIN UR ELPH-MCNC: 1.8 GM/DL
GLUCOSE SERPL-MCNC: 134 MG/DL (ref 65–99)
HCT VFR BLD AUTO: 19.9 % (ref 37.5–51)
HCT VFR BLD AUTO: 22.9 % (ref 37.5–51)
HCT VFR BLD AUTO: 22.9 % (ref 37.5–51)
HCT VFR BLD AUTO: 26.7 % (ref 37.5–51)
HGB BLD-MCNC: 6.5 G/DL (ref 13–17.7)
HGB BLD-MCNC: 7.6 G/DL (ref 13–17.7)
HGB BLD-MCNC: 7.6 G/DL (ref 13–17.7)
HGB BLD-MCNC: 8.4 G/DL (ref 13–17.7)
IMM GRANULOCYTES # BLD AUTO: 0.17 10*3/MM3 (ref 0–0.05)
IMM GRANULOCYTES NFR BLD AUTO: 2.7 % (ref 0–0.5)
INR PPP: 1.22 (ref 0.85–1.16)
INR PPP: 1.23 (ref 0.85–1.16)
LYMPHOCYTES # BLD AUTO: 1.42 10*3/MM3 (ref 0.7–3.1)
LYMPHOCYTES NFR BLD AUTO: 22.2 % (ref 19.6–45.3)
MCH RBC QN AUTO: 29.1 PG (ref 26.6–33)
MCHC RBC AUTO-ENTMCNC: 33.2 G/DL (ref 31.5–35.7)
MCV RBC AUTO: 87.7 FL (ref 79–97)
MONOCYTES # BLD AUTO: 0.52 10*3/MM3 (ref 0.1–0.9)
MONOCYTES NFR BLD AUTO: 8.1 % (ref 5–12)
NEUTROPHILS NFR BLD AUTO: 4.12 10*3/MM3 (ref 1.7–7)
NEUTROPHILS NFR BLD AUTO: 64.3 % (ref 42.7–76)
NRBC BLD AUTO-RTO: 4.1 /100 WBC (ref 0–0.2)
PLATELET # BLD AUTO: 233 10*3/MM3 (ref 140–450)
PMV BLD AUTO: 8.9 FL (ref 6–12)
POTASSIUM SERPL-SCNC: 4.5 MMOL/L (ref 3.5–5.2)
PROT SERPL-MCNC: 4.5 G/DL (ref 6–8.5)
PROTHROMBIN TIME: 15 SECONDS (ref 11.4–14.4)
PROTHROMBIN TIME: 15.1 SECONDS (ref 11.4–14.4)
RBC # BLD AUTO: 2.61 10*6/MM3 (ref 4.14–5.8)
SARS-COV-2 RDRP RESP QL NAA+PROBE: NORMAL
SARS-COV-2 RNA NOSE QL NAA+PROBE: NOT DETECTED
SODIUM SERPL-SCNC: 131 MMOL/L (ref 136–145)
UNIT  ABO: NORMAL
UNIT  ABO: NORMAL
UNIT  RH: NORMAL
UNIT  RH: NORMAL
VIT B12 BLD-MCNC: 323 PG/ML (ref 211–946)
WBC NRBC COR # BLD: 6.4 10*3/MM3 (ref 3.4–10.8)

## 2021-12-05 PROCEDURE — 36430 TRANSFUSION BLD/BLD COMPNT: CPT

## 2021-12-05 PROCEDURE — 25010000002 PROPOFOL 10 MG/ML EMULSION: Performed by: NURSE ANESTHETIST, CERTIFIED REGISTERED

## 2021-12-05 PROCEDURE — 82607 VITAMIN B-12: CPT | Performed by: INTERNAL MEDICINE

## 2021-12-05 PROCEDURE — 86900 BLOOD TYPING SEROLOGIC ABO: CPT

## 2021-12-05 PROCEDURE — 99233 SBSQ HOSP IP/OBS HIGH 50: CPT | Performed by: INTERNAL MEDICINE

## 2021-12-05 PROCEDURE — 80053 COMPREHEN METABOLIC PANEL: CPT | Performed by: INTERNAL MEDICINE

## 2021-12-05 PROCEDURE — P9016 RBC LEUKOCYTES REDUCED: HCPCS

## 2021-12-05 PROCEDURE — 25010000002 CEFTRIAXONE PER 250 MG: Performed by: INTERNAL MEDICINE

## 2021-12-05 PROCEDURE — 43235 EGD DIAGNOSTIC BRUSH WASH: CPT | Performed by: INTERNAL MEDICINE

## 2021-12-05 PROCEDURE — 25010000002 HYDROMORPHONE PER 4 MG: Performed by: INTERNAL MEDICINE

## 2021-12-05 PROCEDURE — 63710000001 INSULIN LISPRO (HUMAN) PER 5 UNITS: Performed by: NURSE ANESTHETIST, CERTIFIED REGISTERED

## 2021-12-05 PROCEDURE — 85018 HEMOGLOBIN: CPT | Performed by: INTERNAL MEDICINE

## 2021-12-05 PROCEDURE — 85610 PROTHROMBIN TIME: CPT | Performed by: INTERNAL MEDICINE

## 2021-12-05 PROCEDURE — 0DJ08ZZ INSPECTION OF UPPER INTESTINAL TRACT, VIA NATURAL OR ARTIFICIAL OPENING ENDOSCOPIC: ICD-10-PCS | Performed by: INTERNAL MEDICINE

## 2021-12-05 PROCEDURE — 87635 SARS-COV-2 COVID-19 AMP PRB: CPT | Performed by: INTERNAL MEDICINE

## 2021-12-05 PROCEDURE — 85014 HEMATOCRIT: CPT | Performed by: INTERNAL MEDICINE

## 2021-12-05 PROCEDURE — 85025 COMPLETE CBC W/AUTO DIFF WBC: CPT | Performed by: INTERNAL MEDICINE

## 2021-12-05 RX ORDER — SODIUM CHLORIDE 9 MG/ML
INJECTION, SOLUTION INTRAVENOUS CONTINUOUS PRN
Status: DISCONTINUED | OUTPATIENT
Start: 2021-12-05 | End: 2021-12-05 | Stop reason: SURG

## 2021-12-05 RX ORDER — PEG-3350, SODIUM SULFATE, SODIUM CHLORIDE, POTASSIUM CHLORIDE, SODIUM ASCORBATE AND ASCORBIC ACID 7.5-2.691G
1000 KIT ORAL
Status: COMPLETED | OUTPATIENT
Start: 2021-12-06 | End: 2021-12-06

## 2021-12-05 RX ORDER — LIDOCAINE HYDROCHLORIDE 10 MG/ML
0.5 INJECTION, SOLUTION EPIDURAL; INFILTRATION; INTRACAUDAL; PERINEURAL ONCE AS NEEDED
Status: CANCELLED | OUTPATIENT
Start: 2021-12-05

## 2021-12-05 RX ORDER — FAMOTIDINE 20 MG/1
20 TABLET, FILM COATED ORAL ONCE
Status: CANCELLED | OUTPATIENT
Start: 2021-12-05 | End: 2021-12-05

## 2021-12-05 RX ORDER — SODIUM CHLORIDE, SODIUM LACTATE, POTASSIUM CHLORIDE, CALCIUM CHLORIDE 600; 310; 30; 20 MG/100ML; MG/100ML; MG/100ML; MG/100ML
9 INJECTION, SOLUTION INTRAVENOUS CONTINUOUS
Status: CANCELLED | OUTPATIENT
Start: 2021-12-05

## 2021-12-05 RX ORDER — LIDOCAINE HYDROCHLORIDE 10 MG/ML
INJECTION, SOLUTION EPIDURAL; INFILTRATION; INTRACAUDAL; PERINEURAL AS NEEDED
Status: DISCONTINUED | OUTPATIENT
Start: 2021-12-05 | End: 2021-12-05 | Stop reason: SURG

## 2021-12-05 RX ORDER — ONDANSETRON 2 MG/ML
4 INJECTION INTRAMUSCULAR; INTRAVENOUS ONCE AS NEEDED
Status: DISCONTINUED | OUTPATIENT
Start: 2021-12-05 | End: 2021-12-05 | Stop reason: HOSPADM

## 2021-12-05 RX ORDER — SODIUM CHLORIDE 0.9 % (FLUSH) 0.9 %
10 SYRINGE (ML) INJECTION EVERY 12 HOURS SCHEDULED
Status: CANCELLED | OUTPATIENT
Start: 2021-12-05

## 2021-12-05 RX ORDER — ACETAMINOPHEN 325 MG/1
650 TABLET ORAL EVERY 6 HOURS PRN
Status: DISCONTINUED | OUTPATIENT
Start: 2021-12-05 | End: 2021-12-07 | Stop reason: HOSPADM

## 2021-12-05 RX ORDER — SODIUM CHLORIDE 9 MG/ML
100 INJECTION, SOLUTION INTRAVENOUS CONTINUOUS
Status: ACTIVE | OUTPATIENT
Start: 2021-12-05 | End: 2021-12-06

## 2021-12-05 RX ORDER — PROPOFOL 10 MG/ML
VIAL (ML) INTRAVENOUS AS NEEDED
Status: DISCONTINUED | OUTPATIENT
Start: 2021-12-05 | End: 2021-12-05 | Stop reason: SURG

## 2021-12-05 RX ORDER — SODIUM CHLORIDE 0.9 % (FLUSH) 0.9 %
10 SYRINGE (ML) INJECTION AS NEEDED
Status: CANCELLED | OUTPATIENT
Start: 2021-12-05

## 2021-12-05 RX ORDER — PEG-3350, SODIUM SULFATE, SODIUM CHLORIDE, POTASSIUM CHLORIDE, SODIUM ASCORBATE AND ASCORBIC ACID 7.5-2.691G
1000 KIT ORAL
Status: COMPLETED | OUTPATIENT
Start: 2021-12-05 | End: 2021-12-05

## 2021-12-05 RX ORDER — MIDAZOLAM HYDROCHLORIDE 1 MG/ML
0.5 INJECTION INTRAMUSCULAR; INTRAVENOUS
Status: CANCELLED | OUTPATIENT
Start: 2021-12-05

## 2021-12-05 RX ORDER — FAMOTIDINE 10 MG/ML
20 INJECTION, SOLUTION INTRAVENOUS ONCE
Status: CANCELLED | OUTPATIENT
Start: 2021-12-05 | End: 2021-12-05

## 2021-12-05 RX ADMIN — ROSUVASTATIN CALCIUM 40 MG: 20 TABLET, COATED ORAL at 21:54

## 2021-12-05 RX ADMIN — SODIUM CHLORIDE: 9 INJECTION, SOLUTION INTRAVENOUS at 13:33

## 2021-12-05 RX ADMIN — LEVOTHYROXINE SODIUM 112 MCG: 112 TABLET ORAL at 06:12

## 2021-12-05 RX ADMIN — THYROID, PORCINE 15 MG: 30 TABLET ORAL at 08:29

## 2021-12-05 RX ADMIN — LIDOCAINE HYDROCHLORIDE 100 MG: 10 INJECTION, SOLUTION EPIDURAL; INFILTRATION; INTRACAUDAL; PERINEURAL at 13:33

## 2021-12-05 RX ADMIN — SODIUM CHLORIDE, PRESERVATIVE FREE 10 ML: 5 INJECTION INTRAVENOUS at 08:32

## 2021-12-05 RX ADMIN — ATENOLOL 50 MG: 50 TABLET ORAL at 08:31

## 2021-12-05 RX ADMIN — PANTOPRAZOLE SODIUM 40 MG: 40 INJECTION, POWDER, FOR SOLUTION INTRAVENOUS at 17:19

## 2021-12-05 RX ADMIN — SODIUM CHLORIDE, PRESERVATIVE FREE 10 ML: 5 INJECTION INTRAVENOUS at 21:52

## 2021-12-05 RX ADMIN — POLYETHYLENE GLYCOL 3350, SODIUM SULFATE, SODIUM CHLORIDE, POTASSIUM CHLORIDE, SODIUM ASCORBATE, AND ASCORBIC ACID 1000 ML: KIT at 17:32

## 2021-12-05 RX ADMIN — HYDROMORPHONE HYDROCHLORIDE 0.5 MG: 1 INJECTION, SOLUTION INTRAMUSCULAR; INTRAVENOUS; SUBCUTANEOUS at 10:19

## 2021-12-05 RX ADMIN — SODIUM CHLORIDE 1 G: 900 INJECTION INTRAVENOUS at 08:31

## 2021-12-05 RX ADMIN — PROPOFOL 100 MG: 10 INJECTION, EMULSION INTRAVENOUS at 13:33

## 2021-12-05 RX ADMIN — FINASTERIDE 5 MG: 5 TABLET, FILM COATED ORAL at 08:29

## 2021-12-05 RX ADMIN — SODIUM CHLORIDE 100 ML/HR: 9 INJECTION, SOLUTION INTRAVENOUS at 08:32

## 2021-12-05 RX ADMIN — PANTOPRAZOLE SODIUM 40 MG: 40 INJECTION, POWDER, FOR SOLUTION INTRAVENOUS at 06:11

## 2021-12-05 RX ADMIN — ISOSORBIDE MONONITRATE 60 MG: 60 TABLET, EXTENDED RELEASE ORAL at 08:30

## 2021-12-05 NOTE — ANESTHESIA POSTPROCEDURE EVALUATION
Patient: Cal Frausto    Procedure Summary     Date: 12/05/21 Room / [facility]:  [facility]    Anesthesia Start: 1326 Anesthesia Stop: 1344    Procedure: ESOPHAGOGASTRODUODENOSCOPY (N/A ) Diagnosis:     Surgeons: Shane Kelly MD Provider: Gary Martins MD    Anesthesia Type: general ASA Status: 4 - Emergent          Anesthesia Type: general    Vitals  Vitals Value Taken Time   /60 12/05/21 1331   Temp     Pulse 57 12/05/21 1331   Resp 20 12/05/21 1331   SpO2 95 % 12/05/21 1331           Post Anesthesia Care and Evaluation    Patient location during evaluation: PACU  Patient participation: complete - patient participated  Level of consciousness: sleepy but conscious  Pain score: 0  Pain management: adequate  Airway patency: patent  Anesthetic complications: No anesthetic complications  PONV Status: none  Cardiovascular status: acceptable and stable  Respiratory status: nasal cannula, unassisted and acceptable  Hydration status: acceptable

## 2021-12-05 NOTE — DISCHARGE INSTRUCTIONS
Upper Endoscopy, Adult, Care After  This sheet gives you information about how to care for yourself after your procedure. Your health care provider may also give you more specific instructions. If you have problems or questions, contact your health care provider.  What can I expect after the procedure?  After the procedure, it is common to have:  · A sore throat.  · Mild stomach pain or discomfort.  · Bloating.  · Nausea.  Follow these instructions at home:    · Follow instructions from your health care provider about what to eat or drink after your procedure.  · Return to your normal activities as told by your health care provider. Ask your health care provider what activities are safe for you.  · Take over-the-counter and prescription medicines only as told by your health care provider.  · If you were given a sedative during the procedure, it can affect you for several hours. Do not drive or operate machinery until your health care provider says that it is safe.  · Keep all follow-up visits as told by your health care provider. This is important.  Contact a health care provider if you have:  · A sore throat that lasts longer than one day.  · Trouble swallowing.  Get help right away if:  · You vomit blood or your vomit looks like coffee grounds.  · You have:  ? A fever.  ? Bloody, black, or tarry stools.  ? A severe sore throat or you cannot swallow.  ? Difficulty breathing.  ? Severe pain in your chest or abdomen.  Summary  · After the procedure, it is common to have a sore throat, mild stomach discomfort, bloating, and nausea.  · If you were given a sedative during the procedure, it can affect you for several hours. Do not drive or operate machinery until your health care provider says that it is safe.  · Follow instructions from your health care provider about what to eat or drink after your procedure.  · Return to your normal activities as told by your health care provider.  This information is not intended to  replace advice given to you by your health care provider. Make sure you discuss any questions you have with your health care provider.  Document Revised: 12/15/2020 Document Reviewed: 05/20/2019  [employer] Patient Education © 2021 [employer]  Monitored Anesthesia Care, Care After  This sheet gives you information about how to care for yourself after your procedure. Your health care provider may also give you more specific instructions. If you have problems or questions, contact your health care provider.  What can I expect after the procedure?  After the procedure, it is common to have:  · Tiredness.  · Forgetfulness about what happened after the procedure.  · Impaired judgment for important decisions.  · Nausea or vomiting.  · Some difficulty with balance.  Follow these instructions at home:  For the time period you were told by your health care provider:         · Rest as needed.  · Do not participate in activities where you could fall or become injured.  · Do not drive or use machinery.  · Do not drink alcohol.  · Do not take sleeping pills or medicines that cause drowsiness.  · Do not make important decisions or sign legal documents.  · Do not take care of children on your own.  Eating and drinking  · Follow the diet that is recommended by your health care provider.  · Drink enough fluid to keep your urine pale yellow.  · If you vomit:  ? Drink water, juice, or soup when you can drink without vomiting.  ? Make sure you have little or no nausea before eating solid foods.  General instructions  · Have a responsible adult stay with you for the time you are told. It is important to have someone help care for you until you are awake and alert.  · Take over-the-counter and prescription medicines only as told by your health care provider.  · If you have sleep apnea, surgery and certain medicines can increase your risk for breathing problems. Follow instructions from your health care provider about wearing your sleep  device:  ? Anytime you are sleeping, including during daytime naps.  ? While taking prescription pain medicines, sleeping medicines, or medicines that make you drowsy.  · Avoid smoking.  · Keep all follow-up visits as told by your health care provider. This is important.  Contact a health care provider if:  · You keep feeling nauseous or you keep vomiting.  · You feel light-headed.  · You are still sleepy or having trouble with balance after 24 hours.  · You develop a rash.  · You have a fever.  · You have redness or swelling around the IV site.  Get help right away if:  · You have trouble breathing.  · You have new-onset confusion at home.  Summary  · For several hours after your procedure, you may feel tired. You may also be forgetful and have poor judgment.  · Have a responsible adult stay with you for the time you are told. It is important to have someone help care for you until you are awake and alert.  · Rest as told. Do not drive or operate machinery. Do not drink alcohol or take sleeping pills.  · Get help right away if you have trouble breathing, or if you suddenly become confused.  This information is not intended to replace advice given to you by your health care provider. Make sure you discuss any questions you have with your health care provider.  Document Revised: 04/23/2021 Document Reviewed: 11/19/2020  [employer] Patient Education © 2021 [employer]

## 2021-12-05 NOTE — POST-PROCEDURE NOTE
EGD-  No lesions seen except for small prepyloric nodule      REC Colonoscopy in am  If neg needs Pill Cam

## 2021-12-05 NOTE — PROGRESS NOTES
[facility] Medicine Services  PROGRESS NOTE    Patient Name: Cal Frausto  : 1945  MRN: [id number]    Date of Admission: 2021  Primary Care Physician: Harsha Smith MD    Subjective   Subjective     CC:  F/u anemia, bruising, supratherapeutic INR    HPI:  Patient resting in bed, no issues overnight. Not sure if he has had black stools, arms still with significant bruising.    ROS:  Gen- No fevers, chills  CV- No chest pain, palpitations  Resp- No cough, dyspnea  GI- No N/V/D, abd pain     Objective   Objective     Vital Signs:   Temp:  [98 °F (36.7 °C)-99.3 °F (37.4 °C)] 98.6 °F (37 °C)  Heart Rate:  [] 58  Resp:  [16-18] 16  BP: (101-148)/(48-68) 110/63  Flow (L/min):  [2] 2     Physical Exam:  Constitutional: No acute distress, awake, alert  HENT: NCAT, mucous membranes moist  Respiratory: Clear to auscultation bilaterally, respiratory effort normal   Cardiovascular: RRR, no murmurs, rubs, or gallops  Gastrointestinal: Positive bowel sounds, soft, nontender, nondistended  Musculoskeletal: No bilateral ankle edema, large left arm hematoma extending from left wrist to left upper arm  Psychiatric: Appropriate affect, cooperative  Neurologic: Oriented x 3, strength symmetric in all extremities, Cranial Nerves grossly intact to confrontation, speech clear  Skin: No rashes    Results Reviewed:  LAB RESULTS:      Lab 21  0708 21  0023 21  1658 21  1349 21  1219   WBC 6.40  --   --   --  8.17   HEMOGLOBIN 7.6*  7.6* 6.5* 5.5*  --  5.7*   HEMATOCRIT 22.9*  22.9* 19.9* 17.7*  --  17.8*   PLATELETS 233  --   --   --  314   NEUTROS ABS 4.12  --   --   --  6.11   IMMATURE GRANS (ABS) 0.17*  --   --   --  0.22*   LYMPHS ABS 1.42  --   --   --  1.19   MONOS ABS 0.52  --   --   --  0.59   EOS ABS 0.14  --   --   --  0.04   MCV 87.7  --   --   --  86.0   PROTIME 15.1* 15.0* 15.9* >120.0* >120.0*         Lab 21  0708 21  1349   SODIUM  131* 130*  129*   POTASSIUM 4.5 4.2  4.2   CHLORIDE 101 95*  94*   CO2 24.0 22.0  23.0   ANION GAP 6.0 13.0  12.0   BUN 20 23  23   CREATININE 1.51* 1.57*  1.56*   GLUCOSE 134* 288*  280*   CALCIUM 7.5* 8.4*  8.1*         Lab 12/05/21  0708 12/04/21  1349   TOTAL PROTEIN 4.5* 6.2   ALBUMIN 2.70* 3.30*   GLOBULIN 1.8 2.9   ALT (SGPT) 9 15   AST (SGOT) 15 20   BILIRUBIN 0.9 0.5   ALK PHOS 36* 44         Lab 12/05/21  0708 12/05/21  0023 12/04/21  1658 12/04/21  1349 12/04/21  1219   PROTIME 15.1* 15.0* 15.9* >120.0* >120.0*   INR 1.23* 1.22* 1.31* >10.00* >10.00*             Lab 12/04/21  1349   IRON 96   IRON SATURATION 28   TIBC 347   TRANSFERRIN 233   ABO TYPING B   RH TYPING Positive   ANTIBODY SCREEN Negative         Brief Urine Lab Results  (Last result in the past 365 days)      Color   Clarity   Blood   Leuk Est   Nitrite   Protein   CREAT   Urine HCG        12/04/21 1515 Orange   Cloudy   Large (3+)   Negative   Negative   30 mg/dL (1+)                 Microbiology Results Abnormal     None          No radiology results from the last 24 hrs        I have reviewed the medications:  Scheduled Meds:atenolol, 50 mg, Oral, Daily  cefTRIAXone, 1 g, Intravenous, Q24H  finasteride, 5 mg, Oral, Daily  isosorbide mononitrate, 60 mg, Oral, Daily  levothyroxine, 112 mcg, Oral, Q AM  O2, 2 L/min, Inhalation, Nightly  pantoprazole, 40 mg, Intravenous, Q12H  rosuvastatin, 40 mg, Oral, Nightly  sodium chloride, 10 mL, Intravenous, Q12H  thyroid, 15 mg, Oral, Daily      Continuous Infusions:sodium chloride, 100 mL/hr, Last Rate: 100 mL/hr (12/05/21 0832)      PRN Meds:.HYDROmorphone  •  sodium chloride    Assessment/Plan   Assessment & Plan     Active Hospital Problems    Diagnosis  POA   • **Anemia [D64.9]  Yes   • Hx of renal cell cancer. Left nephrectomy 10/2019 [Z85.528]  Not Applicable   • Coronary artery disease involving coronary bypass graft of native heart without angina pectoris [I25.810]  Yes   • History  DVT left leg 2016 on chronic anticoagulation with Coumadin (CMS/Formerly [facility]) [I82.409]  Yes   • COPD in active smoker. On home oxygen (CMS/Formerly [facility]) [J44.9]  Yes   • Dyslipidemia [E78.5]  Yes   • Type 2 diabetes mellitus. HbA1c 11.2 (CMS/Formerly [facility]) [E11.9]  Yes   • CAD involving coronary bypass graft of native heart. CABG 2004, stent October 2019 [I25.810]  Yes   • Essential hypertension [I10]  Yes      Resolved Hospital Problems   No resolved problems to display.        Brief Hospital Course to date:  Cal Frausto is a 76 y.o. male with past medical history of coronary artery disease status post CABG x3 in 2004, history of left lower extremity DVT in 2016 on anticoagulation with Coumadin, essential hypertension, dyslipidemia, AAA status post endovascular repair in 2020, type 2 diabetes, ongoing tobacco use, renal cell carcinoma status post left nephrectomy 2019, COPD on nocturnal oxygen, arthritis, hypothyroidism who presented to the hospital today brought by his daughter for concern of left arm swelling, blue discoloration and tenderness for 1 week. He was found to be severely anemic with hemoglobin 5.7 and with INR more than 10    Acute blood loss anemia  Severe symptomatic anemia  Supratherapeutic INR  - s/p Kcentra and IV vitamin K with improvement in INR (>10 on admission)  - holding coumadin  - s/p 4 units PRBCs  - IV PPI BID  - serial H&H, transfuse Hgb <7  - GI consulted, will likely need endoscopy     Hematuria  UTI  - The patient mentioned that he had cystoscopy done 3 months ago with his urologist after part of his routine follow-up after his nephrectomy for left renal cancer and according to him it was negative study.  - UA with 2+ bacteria, continue rocephin add on urine culture  - likely secondary to supratherapeutic INR     Left arm and forearm ecchymosis and tenderness  - Cool compresses  - PRN pain control      CAD s/p CABG    History of left lower extremity DVT in 2016  - coumadin on hold for now    Essential  hypertension   Dyslipidemia  COPD, on nocturnal oxygen  Hypothyroidism  - All those medical conditions are chronic and stable, home meds resumed as appropriate    DVT prophylaxis:  Mechanical DVT prophylaxis orders are present.       AM-PAC 6 Clicks Score (PT): 15 (12/05/21 0832)    Disposition: I expect the patient to be discharged TBD    CODE STATUS:   Code Status and Medical Interventions:   Ordered at: 12/04/21 1512     Level Of Support Discussed With:    Patient     Code Status (Patient has no pulse and is not breathing):    CPR (Attempt to Resuscitate)     Medical Interventions (Patient has pulse or is breathing):    Full Support       Mary Lucero, DO  12/05/21

## 2021-12-05 NOTE — ANESTHESIA PREPROCEDURE EVALUATION
Anesthesia Evaluation                  Airway   Mallampati: I  TM distance: >3 FB  Neck ROM: full  No difficulty expected  Dental      Pulmonary    (+) pneumonia , COPD, home oxygen,   Cardiovascular     ECG reviewed    (+) hypertension, CAD, CABG, DVT,       Neuro/Psych  (+) seizures, psychiatric history Depression and Anxiety,     GI/Hepatic/Renal/Endo    (+)   diabetes mellitus,     Musculoskeletal     Abdominal    Substance History      OB/GYN          Other                        Anesthesia Plan    ASA 4 - emergent     general     intravenous induction     Anesthetic plan, all risks, benefits, and alternatives have been provided, discussed and informed consent has been obtained with: patient.    Plan discussed with CRNA.

## 2021-12-05 NOTE — PROGRESS NOTES
[facility] Medicine Services  PROGRESS NOTE    Patient Name: Cal Frausto  : 1945  MRN: [id number]    Date of Admission: 2021  Primary Care Physician: Harsha Smith MD    Subjective   Subjective     CC:  Severe anemia and supratherapeutic INR    HPI:  I have seen and evaluated the patient this morning.  Comfortable in bed.  Feeling hungry wants to eat but understand that he needs to stay n.p.o. for EGD.  Tolerated 2 units of blood yesterday well.  Receiving third unit now.  No other acute complaints.  Daughter at bedside and updated about the plan of care    ROS:  10 point review of systems is negative except for what is mentioned in HPI      Objective   Objective     Vital Signs:   Temp:  [98.1 °F (36.7 °C)-99.3 °F (37.4 °C)] 98.9 °F (37.2 °C)  Heart Rate:  [] 58  Resp:  [16-18] 16  BP: (101-148)/(52-68) 105/57  Flow (L/min):  [2] 2     Physical Exam:  General: Comfortable, not in any acute distress, appears stated age, conversant and cooperative  Head: Atraumatic and normocephalic, without obvious abnormality  Eyes:   Conjunctivae and sclerae normal, no Icterus. Pallor  Ears:  Ears appear intact with no abnormalities noted  Throat: No oral lesions, no thrush, oral mucosa moist  Neck: Supple, trachea midline, no thyromegaly  Back:   No kyphoscoliosis present. No tenderness to palpation,   no sacral edema  Lungs: Clear to auscultation bilaterally, equal air entry, no wheezing or crackles  Heart:  Normal S1 and S2, no murmur, no gallop, No JVD, no lower extremity swelling  Abdomen:  Soft, no tenderness, no organomegaly, normal bowel sounds  Normal bowel sounds, no masses, no organomegaly. Soft, nontender, nondistended, no guarding, no rebound tenderness.  Extremities: No gross abnormalities, no clubbing, pulses palpable and equal bilaterally  Skin: Severe ecchymosis along the lateral aspect of the left forearm and arm with tenderness to palpation.  Neurologic:  Cranial nerves appear intact with no evidence of facial asymmetry, normal motor and sensory functions in all 4 extremities  Psych: Alert and oriented x 3, normal mood    Results Reviewed:  LAB RESULTS:      Lab 12/05/21  0708 12/05/21  0023 12/04/21  1658 12/04/21  1349 12/04/21  1219   WBC 6.40  --   --   --  8.17   HEMOGLOBIN 7.6*  7.6* 6.5* 5.5*  --  5.7*   HEMATOCRIT 22.9*  22.9* 19.9* 17.7*  --  17.8*   PLATELETS 233  --   --   --  314   NEUTROS ABS 4.12  --   --   --  6.11   IMMATURE GRANS (ABS) 0.17*  --   --   --  0.22*   LYMPHS ABS 1.42  --   --   --  1.19   MONOS ABS 0.52  --   --   --  0.59   EOS ABS 0.14  --   --   --  0.04   MCV 87.7  --   --   --  86.0   PROTIME 15.1* 15.0* 15.9* >120.0* >120.0*         Lab 12/05/21  0708 12/04/21  1349   SODIUM 131* 130*  129*   POTASSIUM 4.5 4.2  4.2   CHLORIDE 101 95*  94*   CO2 24.0 22.0  23.0   ANION GAP 6.0 13.0  12.0   BUN 20 23  23   CREATININE 1.51* 1.57*  1.56*   GLUCOSE 134* 288*  280*   CALCIUM 7.5* 8.4*  8.1*         Lab 12/05/21  0708 12/04/21  1349   TOTAL PROTEIN 4.5* 6.2   ALBUMIN 2.70* 3.30*   GLOBULIN 1.8 2.9   ALT (SGPT) 9 15   AST (SGOT) 15 20   BILIRUBIN 0.9 0.5   ALK PHOS 36* 44         Lab 12/05/21  0708 12/05/21  0023 12/04/21  1658 12/04/21  1349 12/04/21  1219   PROTIME 15.1* 15.0* 15.9* >120.0* >120.0*   INR 1.23* 1.22* 1.31* >10.00* >10.00*             Lab 12/04/21  1349   IRON 96   IRON SATURATION 28   TIBC 347   TRANSFERRIN 233   ABO TYPING B   RH TYPING Positive   ANTIBODY SCREEN Negative         Brief Urine Lab Results  (Last result in the past 365 days)      Color   Clarity   Blood   Leuk Est   Nitrite   Protein   CREAT   Urine HCG        12/04/21 1515 Orange   Cloudy   Large (3+)   Negative   Negative   30 mg/dL (1+)                 Microbiology Results Abnormal     None          No radiology results from the last 24 hrs        I have reviewed the medications:  Scheduled Meds:atenolol, 50 mg, Oral, Daily  finasteride, 5 mg,  Oral, Daily  isosorbide mononitrate, 60 mg, Oral, Daily  levothyroxine, 112 mcg, Oral, Q AM  O2, 2 L/min, Inhalation, Nightly  pantoprazole, 40 mg, Intravenous, Q12H  rosuvastatin, 40 mg, Oral, Nightly  sodium chloride, 10 mL, Intravenous, Q12H  thyroid, 15 mg, Oral, Daily  warfarin (COUMADIN) (dosing per levels), , Does not apply, Daily      Continuous Infusions:Pharmacy to dose warfarin,       PRN Meds:.HYDROmorphone  •  Pharmacy to dose warfarin  •  sodium chloride    Assessment/Plan   Assessment & Plan     Active Hospital Problems    Diagnosis  POA   • Anemia [D64.9]  Yes      Resolved Hospital Problems   No resolved problems to display.        Brief Hospital Course to date:  Cal Frausto is a 76 y.o. male patient with past medical history of coronary artery disease status post CABG x3 in 2004, history of left lower extremity DVT in 2016 on anticoagulation with Coumadin, essential hypertension, dyslipidemia, AAA status post endovascular repair in 2020, type 2 diabetes, ongoing tobacco use, renal cell carcinoma status post left nephrectomy 2019, COPD on nocturnal oxygen, arthritis, hypothyroidism who presented to the hospital today brought by his daughter for concern of left arm swelling, blue discoloration and tenderness for 1 week.  He was found to be severely anemic with hemoglobin 5.7 and with INR more than 10     Assessment and plan:  Acute blood loss anemia  Severe symptomatic anemia  Concern for GI bleed  · Patient remained hemodynamically stable. Hemoglobin improved to 7.6 after 2 units of blood transfusion yesterday. We will transfuse another unit today, target hemoglobin more than 8 given his cardiac history  · Continue IV Protonix twice daily  · Patient received Kcentra and vitamin K yesterday in the ER, INR corrected to 1.2  · EGD today is negative for any peptic ulcer disease.  Plan for colonoscopy tomorrow.  Per GI, if colonoscopy is negative, the patient will need capsule endoscopy.  · Appreciate  help and recommendation from GI team.    · Continue to hold Coumadin.  Consult pharmacy to dose once INR is controlled and any possible source of bleeding is ruled out  · Anemia work-up: Normal iron studies are markedly elevated reticulocyte count at 7% consistent with acute blood loss anemia resident chronic anemia     History of hematuria  History of UTI  · Patient was recently treated for urine tract infection with antibiotic that he does not recall its name and not mentioned in his medication reconciliation  · Urine analysis is positive for bacteriuria and UTI.  · Start Rocephin and follow urine culture  · He also reported history of hematuria that currently resolved  · The patient mentioned that he had cystoscopy done 3 months ago with his urologist after part of his routine follow-up after his nephrectomy for left renal nephrectomy cancer and according to him it was negative study.  · Monitor urine for any hematuria.  Low threshold to consult urology if you start having gross hematuria     Left arm and forearm ecchymosis and tenderness  · IV analgesia as needed  · Cool compresses     Mild hypoosmolar hyponatremia  · Asymptomatic   · Continue IV fluids    Coronary disease status post CABG  History of left lower extremity DVT in 2016  Essential hypertension   Dyslipidemia  COPD, on nocturnal oxygen  Hypothyroidism  · All those medical conditions are chronic and stable, resume home meds of applicable     DVT prophylaxis:  Medical and mechanical DVT prophylaxis orders are present.       AM-PAC 6 Clicks Score (PT): 15 (12/04/21 2000)    Disposition: I expect the patient to be discharged tbd.    CODE STATUS:   Code Status and Medical Interventions:   Ordered at: 12/04/21 1512     Level Of Support Discussed With:    Patient     Code Status (Patient has no pulse and is not breathing):    CPR (Attempt to Resuscitate)     Medical Interventions (Patient has pulse or is breathing):    Full Support       Jose Adams  MD Meir  12/05/21

## 2021-12-05 NOTE — PLAN OF CARE
Goal Outcome Evaluation:  Plan of Care Reviewed With: patient        Progress: no change  Outcome Summary: SR to SB on monitor; received 2 units of PRBS; H/H rechecked\; NP notified another unit ordered; will continue to monitor

## 2021-12-06 ENCOUNTER — ANESTHESIA EVENT (OUTPATIENT)
Dept: GASTROENTEROLOGY | Facility: HOSPITAL | Age: 76
End: 2021-12-06

## 2021-12-06 ENCOUNTER — ANESTHESIA (OUTPATIENT)
Dept: GASTROENTEROLOGY | Facility: HOSPITAL | Age: 76
End: 2021-12-06

## 2021-12-06 ENCOUNTER — APPOINTMENT (OUTPATIENT)
Dept: GASTROENTEROLOGY | Facility: HOSPITAL | Age: 76
End: 2021-12-06

## 2021-12-06 LAB
ANION GAP SERPL CALCULATED.3IONS-SCNC: 9 MMOL/L (ref 5–15)
BASOPHILS # BLD AUTO: 0.03 10*3/MM3 (ref 0–0.2)
BASOPHILS NFR BLD AUTO: 0.6 % (ref 0–1.5)
BH BB BLOOD EXPIRATION DATE: NORMAL
BH BB BLOOD EXPIRATION DATE: NORMAL
BH BB BLOOD TYPE BARCODE: 1700
BH BB BLOOD TYPE BARCODE: 7300
BH BB DISPENSE STATUS: NORMAL
BH BB DISPENSE STATUS: NORMAL
BH BB PRODUCT CODE: NORMAL
BH BB PRODUCT CODE: NORMAL
BH BB UNIT NUMBER: NORMAL
BH BB UNIT NUMBER: NORMAL
BUN SERPL-MCNC: 15 MG/DL (ref 8–23)
BUN/CREAT SERPL: 11.9 (ref 7–25)
CALCIUM SPEC-SCNC: 7.8 MG/DL (ref 8.6–10.5)
CHLORIDE SERPL-SCNC: 106 MMOL/L (ref 98–107)
CO2 SERPL-SCNC: 22 MMOL/L (ref 22–29)
CREAT SERPL-MCNC: 1.26 MG/DL (ref 0.76–1.27)
CROSSMATCH INTERPRETATION: NORMAL
CROSSMATCH INTERPRETATION: NORMAL
DEPRECATED RDW RBC AUTO: 49.6 FL (ref 37–54)
EOSINOPHIL # BLD AUTO: 0.15 10*3/MM3 (ref 0–0.4)
EOSINOPHIL NFR BLD AUTO: 2.9 % (ref 0.3–6.2)
ERYTHROCYTE [DISTWIDTH] IN BLOOD BY AUTOMATED COUNT: 17.4 % (ref 12.3–15.4)
GFR SERPL CREATININE-BSD FRML MDRD: 56 ML/MIN/1.73
GLUCOSE BLDC GLUCOMTR-MCNC: 133 MG/DL (ref 70–130)
GLUCOSE SERPL-MCNC: 126 MG/DL (ref 65–99)
HCT VFR BLD AUTO: 25.4 % (ref 37.5–51)
HCT VFR BLD AUTO: 26.6 % (ref 37.5–51)
HCT VFR BLD AUTO: 27.7 % (ref 37.5–51)
HGB BLD-MCNC: 8.4 G/DL (ref 13–17.7)
HGB BLD-MCNC: 8.6 G/DL (ref 13–17.7)
HGB BLD-MCNC: 9 G/DL (ref 13–17.7)
IMM GRANULOCYTES # BLD AUTO: 0.11 10*3/MM3 (ref 0–0.05)
IMM GRANULOCYTES NFR BLD AUTO: 2.1 % (ref 0–0.5)
INR PPP: 1.16 (ref 0.85–1.16)
LYMPHOCYTES # BLD AUTO: 1.24 10*3/MM3 (ref 0.7–3.1)
LYMPHOCYTES NFR BLD AUTO: 23.7 % (ref 19.6–45.3)
MCH RBC QN AUTO: 28.8 PG (ref 26.6–33)
MCHC RBC AUTO-ENTMCNC: 32.5 G/DL (ref 31.5–35.7)
MCV RBC AUTO: 88.5 FL (ref 79–97)
MONOCYTES # BLD AUTO: 0.49 10*3/MM3 (ref 0.1–0.9)
MONOCYTES NFR BLD AUTO: 9.4 % (ref 5–12)
NEUTROPHILS NFR BLD AUTO: 3.21 10*3/MM3 (ref 1.7–7)
NEUTROPHILS NFR BLD AUTO: 61.3 % (ref 42.7–76)
NRBC BLD AUTO-RTO: 2.9 /100 WBC (ref 0–0.2)
PLATELET # BLD AUTO: 220 10*3/MM3 (ref 140–450)
PMV BLD AUTO: 9 FL (ref 6–12)
POTASSIUM SERPL-SCNC: 4.1 MMOL/L (ref 3.5–5.2)
PROTHROMBIN TIME: 14.5 SECONDS (ref 11.4–14.4)
QT INTERVAL: 458 MS
QTC INTERVAL: 458 MS
RBC # BLD AUTO: 3.13 10*6/MM3 (ref 4.14–5.8)
SODIUM SERPL-SCNC: 137 MMOL/L (ref 136–145)
UNIT  ABO: NORMAL
UNIT  ABO: NORMAL
UNIT  RH: NORMAL
UNIT  RH: NORMAL
WBC NRBC COR # BLD: 5.23 10*3/MM3 (ref 3.4–10.8)

## 2021-12-06 PROCEDURE — C1889 IMPLANT/INSERT DEVICE, NOC: HCPCS | Performed by: INTERNAL MEDICINE

## 2021-12-06 PROCEDURE — 0DBK8ZZ EXCISION OF ASCENDING COLON, VIA NATURAL OR ARTIFICIAL OPENING ENDOSCOPIC: ICD-10-PCS | Performed by: INTERNAL MEDICINE

## 2021-12-06 PROCEDURE — 25010000002 HYDROMORPHONE PER 4 MG: Performed by: INTERNAL MEDICINE

## 2021-12-06 PROCEDURE — 25010000002 CEFTRIAXONE PER 250 MG: Performed by: INTERNAL MEDICINE

## 2021-12-06 PROCEDURE — 82962 GLUCOSE BLOOD TEST: CPT

## 2021-12-06 PROCEDURE — 93005 ELECTROCARDIOGRAM TRACING: CPT | Performed by: ANESTHESIOLOGY

## 2021-12-06 PROCEDURE — 88305 TISSUE EXAM BY PATHOLOGIST: CPT | Performed by: INTERNAL MEDICINE

## 2021-12-06 PROCEDURE — 99232 SBSQ HOSP IP/OBS MODERATE 35: CPT | Performed by: INTERNAL MEDICINE

## 2021-12-06 PROCEDURE — 85025 COMPLETE CBC W/AUTO DIFF WBC: CPT | Performed by: INTERNAL MEDICINE

## 2021-12-06 PROCEDURE — 97165 OT EVAL LOW COMPLEX 30 MIN: CPT

## 2021-12-06 PROCEDURE — 0DJ07ZZ INSPECTION OF UPPER INTESTINAL TRACT, VIA NATURAL OR ARTIFICIAL OPENING: ICD-10-PCS | Performed by: INTERNAL MEDICINE

## 2021-12-06 PROCEDURE — 91110 GI TRC IMG INTRAL ESOPH-ILE: CPT | Performed by: INTERNAL MEDICINE

## 2021-12-06 PROCEDURE — 93010 ELECTROCARDIOGRAM REPORT: CPT | Performed by: INTERNAL MEDICINE

## 2021-12-06 PROCEDURE — 85610 PROTHROMBIN TIME: CPT | Performed by: INTERNAL MEDICINE

## 2021-12-06 PROCEDURE — C1889 IMPLANT/INSERT DEVICE, NOC: HCPCS

## 2021-12-06 PROCEDURE — 91110 GI TRC IMG INTRAL ESOPH-ILE: CPT

## 2021-12-06 PROCEDURE — 45385 COLONOSCOPY W/LESION REMOVAL: CPT | Performed by: INTERNAL MEDICINE

## 2021-12-06 PROCEDURE — 85014 HEMATOCRIT: CPT | Performed by: INTERNAL MEDICINE

## 2021-12-06 PROCEDURE — 25010000002 PROPOFOL 10 MG/ML EMULSION: Performed by: NURSE ANESTHETIST, CERTIFIED REGISTERED

## 2021-12-06 PROCEDURE — 80048 BASIC METABOLIC PNL TOTAL CA: CPT | Performed by: INTERNAL MEDICINE

## 2021-12-06 PROCEDURE — 85018 HEMOGLOBIN: CPT | Performed by: INTERNAL MEDICINE

## 2021-12-06 PROCEDURE — 0DBM8ZZ EXCISION OF DESCENDING COLON, VIA NATURAL OR ARTIFICIAL OPENING ENDOSCOPIC: ICD-10-PCS | Performed by: INTERNAL MEDICINE

## 2021-12-06 DEVICE — DEV CLIP ENDO RESOLUTION360 CONTRL ROT 235CM: Type: IMPLANTABLE DEVICE | Site: DESCENDING COLON | Status: FUNCTIONAL

## 2021-12-06 RX ORDER — HYDROMORPHONE HYDROCHLORIDE 1 MG/ML
0.5 INJECTION, SOLUTION INTRAMUSCULAR; INTRAVENOUS; SUBCUTANEOUS
Status: DISCONTINUED | OUTPATIENT
Start: 2021-12-06 | End: 2021-12-06

## 2021-12-06 RX ORDER — LIDOCAINE HYDROCHLORIDE 10 MG/ML
0.5 INJECTION, SOLUTION EPIDURAL; INFILTRATION; INTRACAUDAL; PERINEURAL ONCE AS NEEDED
Status: DISCONTINUED | OUTPATIENT
Start: 2021-12-06 | End: 2021-12-06

## 2021-12-06 RX ORDER — ONDANSETRON 2 MG/ML
4 INJECTION INTRAMUSCULAR; INTRAVENOUS ONCE AS NEEDED
Status: DISCONTINUED | OUTPATIENT
Start: 2021-12-06 | End: 2021-12-06

## 2021-12-06 RX ORDER — FENTANYL CITRATE 50 UG/ML
50 INJECTION, SOLUTION INTRAMUSCULAR; INTRAVENOUS
Status: DISCONTINUED | OUTPATIENT
Start: 2021-12-06 | End: 2021-12-06

## 2021-12-06 RX ORDER — MAGNESIUM CARB/ALUMINUM HYDROX 105-160MG
296 TABLET,CHEWABLE ORAL ONCE
Status: COMPLETED | OUTPATIENT
Start: 2021-12-06 | End: 2021-12-06

## 2021-12-06 RX ORDER — SODIUM CHLORIDE, SODIUM LACTATE, POTASSIUM CHLORIDE, CALCIUM CHLORIDE 600; 310; 30; 20 MG/100ML; MG/100ML; MG/100ML; MG/100ML
30 INJECTION, SOLUTION INTRAVENOUS CONTINUOUS PRN
Status: DISCONTINUED | OUTPATIENT
Start: 2021-12-06 | End: 2021-12-07

## 2021-12-06 RX ORDER — MIDAZOLAM HYDROCHLORIDE 1 MG/ML
0.5 INJECTION INTRAMUSCULAR; INTRAVENOUS
Status: DISCONTINUED | OUTPATIENT
Start: 2021-12-06 | End: 2021-12-06

## 2021-12-06 RX ORDER — SODIUM CHLORIDE 0.9 % (FLUSH) 0.9 %
10 SYRINGE (ML) INJECTION EVERY 12 HOURS SCHEDULED
Status: DISCONTINUED | OUTPATIENT
Start: 2021-12-06 | End: 2021-12-06

## 2021-12-06 RX ORDER — PROPOFOL 10 MG/ML
VIAL (ML) INTRAVENOUS AS NEEDED
Status: DISCONTINUED | OUTPATIENT
Start: 2021-12-06 | End: 2021-12-06 | Stop reason: SURG

## 2021-12-06 RX ORDER — FAMOTIDINE 10 MG/ML
20 INJECTION, SOLUTION INTRAVENOUS ONCE
Status: DISCONTINUED | OUTPATIENT
Start: 2021-12-06 | End: 2021-12-06

## 2021-12-06 RX ORDER — FAMOTIDINE 20 MG/1
20 TABLET, FILM COATED ORAL ONCE
Status: DISCONTINUED | OUTPATIENT
Start: 2021-12-06 | End: 2021-12-06

## 2021-12-06 RX ORDER — LIDOCAINE HYDROCHLORIDE 10 MG/ML
INJECTION, SOLUTION EPIDURAL; INFILTRATION; INTRACAUDAL; PERINEURAL AS NEEDED
Status: DISCONTINUED | OUTPATIENT
Start: 2021-12-06 | End: 2021-12-06 | Stop reason: SURG

## 2021-12-06 RX ORDER — SODIUM CHLORIDE, SODIUM LACTATE, POTASSIUM CHLORIDE, CALCIUM CHLORIDE 600; 310; 30; 20 MG/100ML; MG/100ML; MG/100ML; MG/100ML
9 INJECTION, SOLUTION INTRAVENOUS CONTINUOUS
Status: DISCONTINUED | OUTPATIENT
Start: 2021-12-06 | End: 2021-12-07

## 2021-12-06 RX ORDER — SODIUM CHLORIDE 0.9 % (FLUSH) 0.9 %
10 SYRINGE (ML) INJECTION AS NEEDED
Status: DISCONTINUED | OUTPATIENT
Start: 2021-12-06 | End: 2021-12-06

## 2021-12-06 RX ADMIN — SODIUM CHLORIDE, POTASSIUM CHLORIDE, SODIUM LACTATE AND CALCIUM CHLORIDE: 600; 310; 30; 20 INJECTION, SOLUTION INTRAVENOUS at 13:07

## 2021-12-06 RX ADMIN — POLYETHYLENE GLYCOL 3350, SODIUM SULFATE, SODIUM CHLORIDE, POTASSIUM CHLORIDE, SODIUM ASCORBATE, AND ASCORBIC ACID 1000 ML: KIT at 03:59

## 2021-12-06 RX ADMIN — PANTOPRAZOLE SODIUM 40 MG: 40 INJECTION, POWDER, FOR SOLUTION INTRAVENOUS at 05:07

## 2021-12-06 RX ADMIN — PROPOFOL 20 MG: 10 INJECTION, EMULSION INTRAVENOUS at 13:07

## 2021-12-06 RX ADMIN — LIDOCAINE HYDROCHLORIDE 50 MG: 10 INJECTION, SOLUTION EPIDURAL; INFILTRATION; INTRACAUDAL; PERINEURAL at 13:07

## 2021-12-06 RX ADMIN — HYDROMORPHONE HYDROCHLORIDE 0.5 MG: 1 INJECTION, SOLUTION INTRAMUSCULAR; INTRAVENOUS; SUBCUTANEOUS at 10:53

## 2021-12-06 RX ADMIN — ROSUVASTATIN CALCIUM 40 MG: 20 TABLET, COATED ORAL at 20:15

## 2021-12-06 RX ADMIN — PROPOFOL 100 MCG/KG/MIN: 10 INJECTION, EMULSION INTRAVENOUS at 13:07

## 2021-12-06 RX ADMIN — FINASTERIDE 5 MG: 5 TABLET, FILM COATED ORAL at 14:58

## 2021-12-06 RX ADMIN — Medication 296 ML: at 08:48

## 2021-12-06 RX ADMIN — THYROID, PORCINE 15 MG: 30 TABLET ORAL at 14:57

## 2021-12-06 RX ADMIN — SODIUM CHLORIDE 1 G: 900 INJECTION INTRAVENOUS at 08:41

## 2021-12-06 RX ADMIN — HYDROMORPHONE HYDROCHLORIDE 0.5 MG: 1 INJECTION, SOLUTION INTRAMUSCULAR; INTRAVENOUS; SUBCUTANEOUS at 20:15

## 2021-12-06 RX ADMIN — PANTOPRAZOLE SODIUM 40 MG: 40 INJECTION, POWDER, FOR SOLUTION INTRAVENOUS at 17:00

## 2021-12-06 NOTE — PLAN OF CARE
Goal Outcome Evaluation:  Plan of Care Reviewed With: patient           Outcome Summary: OT eval complete.  Pt presents with L arm pain, weakness and decreased activity tolerance limiting independence with self care. Pt mod ind supine to sit, min A to don socks, supervision STS,  CGA to ambulate in room without A.  OT will follow to advance pt toward increased independence with self care.  Recommend home upon d/c.

## 2021-12-06 NOTE — ANESTHESIA PREPROCEDURE EVALUATION
Anesthesia Evaluation     Patient summary reviewed and Nursing notes reviewed   NPO Solid Status: > 8 hours  NPO Liquid Status: > 8 hours           Airway   Mallampati: I  TM distance: >3 FB  No difficulty expected  Dental      Pulmonary    (+) pneumonia resolved , COPD (no MDI ) mild, home oxygen,   (-) lung cancer  Cardiovascular     ECG reviewed    (+) hypertension, CAD, CABG, cardiac stents angina (occasional not now ), PVD (AAA endo vasc repair), DVT (remote ) resolved,   (-) dysrhythmias    ROS comment: ECG today SR SA ow normal     CATH 2018   1)  90% proximal left circumflex supplying the large left posterior lateral and collaterals to the right coronary artery/right PDA.  Successful treatment with 3.0 x 12 Xience EES  2.  Widely patent LIMA however distal LAD occluded.  3.  Patent saphenous vein graft to the ramus branch.  30% disease distal.    4.  LVEF 50%.      Neuro/Psych  (+) psychiatric history (EtOh abuse),     (-) seizures (denies ), CVA  GI/Hepatic/Renal/Endo    (+)   renal disease (creat 1.5 s/p nephrectomy ) CRI, diabetes mellitus, thyroid problem hypothyroidism    Musculoskeletal     Abdominal    Substance History   (+) alcohol use,      OB/GYN          Other   arthritis,    history of cancer (kidney ca sp nephrectomy creat 1.5)    ROS/Med Hx Other: Hct 27 despite 4 units prbc                 Anesthesia Plan    ASA 4     general and MAC   (PFl Aim to keep MAP >65 (CAD))  intravenous induction     Anesthetic plan, all risks, benefits, and alternatives have been provided, discussed and informed consent has been obtained with: patient.    Plan discussed with CRNA.

## 2021-12-06 NOTE — THERAPY EVALUATION
Patient Name: Cal Farusto  : 1945    MRN: [id number]                              Today's Date: 2021       Admit Date: 2021    Visit Dx:     ICD-10-CM ICD-9-CM   1. Anemia, unspecified type  D64.9 285.9   2. Supratherapeutic INR  R79.1 790.92   3. Gastrointestinal hemorrhage with melena  K92.1 578.1   4. Hyperglycemia  R73.9 790.29   5. Renal insufficiency  N28.9 593.9   6. Anticoagulated on Coumadin. Held 2 days prior to surgery  Z79.01 V58.61     Patient Active Problem List   Diagnosis   • AAA (abdominal aortic aneurysm) (Formerly [facility])   • CAD involving coronary bypass graft of native heart. CABG , stent 2019   • Essential hypertension   • Tobacco abuse   • Dyslipidemia   • Type 2 diabetes mellitus. HbA1c 11.2 (CMS/Formerly [facility])   • COPD in active smoker. On home oxygen (CMS/Formerly [facility])   • Arthritis   • Hypothyroidism   • History DVT left leg  on chronic anticoagulation with Coumadin (CMS/Formerly [facility])   • Coronary artery disease involving coronary bypass graft of native heart without angina pectoris   • Abdominal aortic aneurysm without rupture (6.4 cm) (CMS/Formerly [facility])   • S/P AAA endograft repair 2020   • Anticoagulated on Coumadin. Held 2 days prior to surgery   • Hx of renal cell cancer. 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   • History of seizures as a child    • Hypertension    • Hypothyroidism    • Pneumonia    • Seizures (HCC)     History of childhood seizures.   • Tobacco abuse     Ongoing   • Type 2 diabetes mellitus (HCC)      Past Surgical History:    Procedure Laterality Date   • ABDOMINAL AORTIC ANEURYSM REPAIR WITH ENDOGRAFT N/A 9/17/2020    Procedure: ABDOMINAL AORTIC ANEURYSM REPAIR WITH ENDOGRAFT;  Surgeon: Flynn Edwards MD;  Location: [facility] HYBRID OR 15;  Service: Cardiothoracic;  Laterality: N/A;  FLUORO - 13 MINS 12 SECS  DOSE - 716mGy  CONTRAST - 70 ml isovue 300   • CARDIAC CATHETERIZATION  11/2004    revealed an occluded saphenous vein graft to the RCA. There was an 80% left main with an occluded LAD with a patent LIMA graft to the LAD. There was also an occluded OM1 with a patent saphenous vein graft to the OM.   • CARDIAC CATHETERIZATION  07/30/2013    with 100% RCA. Occluded SVG. With 100% LAD. Patent LINDSEY with distal LAD occlusion. Widely patent SVG to ramus and OM-1, filling other natives of the collaterals with normal LVEF.   • CARDIAC CATHETERIZATION N/A 5/4/2018    Procedure: Left Heart Cath;  Surgeon: Keegan Aguilera MD;  Location: [facility] CATH INVASIVE LOCATION;  Service: Cardiovascular   • CARPAL TUNNEL RELEASE     • COLONOSCOPY  2018   • CORONARY ANGIOPLASTY WITH STENT PLACEMENT     • CORONARY ARTERY BYPASS GRAFT  2004    x3   • CYST REMOVAL Left     wrist x2   • KIDNEY SURGERY  2018   • POLYPECTOMY        General Information     Row Name 12/06/21 0808          OT Time and Intention    Document Type evaluation  -AC     Mode of Treatment occupational therapy  -     Row Name 12/06/21 0808          General Information    Patient Profile Reviewed yes  -AC     Prior Level of Function independent:; all household mobility; community mobility; ADL's  -AC     Existing Precautions/Restrictions fall  L arm pain  -     Barriers to Rehab none identified  -     Row Name 12/06/21 0808          Occupational Profile    Environmental Supports and Barriers (Occupational Profile) split level, tub shower, no AD, 2 daughters live close by  -     Row Name 12/06/21 0808          Living Environment    Lives With alone  -     Row Name 12/06/21 0808           Home Main Entrance    Number of Stairs, Main Entrance six  -AC     Stair Railings, Main Entrance railing on right side (ascending)  -     Row Name 12/06/21 0808          Stairs Within Home, Primary    Stairs, Within Home, Primary split level, 7 steps up, 7 steps down  -     Number of Stairs, Within Home, Primary other (see comments)  14  -AC     Stair Railings, Within Home, Primary railing on left side (ascending)  -     Row Name 12/06/21 0808          Cognition    Orientation Status (Cognition) oriented x 3  -AC     Row Name 12/06/21 0808          Safety Issues, Functional Mobility    Impairments Affecting Function (Mobility) balance; endurance/activity tolerance; strength; pain  -           User Key  (r) = Recorded By, (t) = Taken By, (c) = Cosigned By    Initials Name Provider Type    AC Yolanda Staley OT Occupational Therapist                 Mobility/ADL's     Row Name 12/06/21 0811          Bed Mobility    Bed Mobility supine-sit  -     Supine-Sit [place] (Bed Mobility) modified independence  -     Assistive Device (Bed Mobility) bed rails  -     Row Name 12/06/21 0811          Transfers    Transfers sit-stand transfer  -     Sit-Stand [place] (Transfers) supervision  -     Row Name 12/06/21 0811          Sit-Stand Transfer    Assistive Device (Sit-Stand Transfers) other (see comments)  No AD  -AC     Row Name 12/06/21 0811          Functional Mobility    Functional Mobility- Ind. Level contact guard assist  -     Functional Mobility- Device other (see comments)  No AD  -AC     Functional Mobility-Distance (Feet) 30  -     Row Name 12/06/21 0811          Activities of Daily Living    BADL Assessment/Intervention lower body dressing  -     Row Name 12/06/21 0811          Lower Body Dressing Assessment/Training    [place] Level (Lower Body Dressing) don; socks; minimum assist (75% patient effort)  -     Position (Lower Body Dressing) edge of bed sitting  -AC      Comment (Lower Body Dressing) increased time and effort d/t L arm pain  -AC           User Key  (r) = Recorded By, (t) = Taken By, (c) = Cosigned By    Initials Name Provider Type    Yolanda Concepcion, ALAINA Occupational Therapist               Obj/Interventions     Row Name 12/06/21 0813          Sensory Assessment (Somatosensory)    Sensory Assessment (Somatosensory) UE sensation intact; bilateral UE  -     Row Name 12/06/21 0813          Vision Assessment/Intervention    Visual Impairment/Limitations WNL  -     Row Name 12/06/21 0813          Range of Motion Comprehensive    General Range of Motion no range of motion deficits identified  -     Row Name 12/06/21 0813          Strength Comprehensive (MMT)    Comment, General Manual Muscle Testing (MMT) Assessment BUE grossly 4/5  -           User Key  (r) = Recorded By, (t) = Taken By, (c) = Cosigned By    Initials Name Provider Type    Yolanda Concepcion, OT Occupational Therapist               Goals/Plan     Row Name 12/06/21 0821          Transfer Goal 1 (OT)    Activity/Assistive Device (Transfer Goal 1, OT) bed-to-chair/chair-to-bed; toilet  -AC     [place] Level/Cues Needed (Transfer Goal 1, OT) standby assist  -AC     Time Frame (Transfer Goal 1, OT) by discharge  -AC     Progress/Outcome (Transfer Goal 1, OT) goal ongoing  -     Row Name 12/06/21 0821          Dressing Goal 1 (OT)    Activity/Device (Dressing Goal 1, OT) upper body dressing; lower body dressing  -AC     [place]/Cues Needed (Dressing Goal 1, OT) set-up required; supervision required  -AC     Time Frame (Dressing Goal 1, OT) by discharge  -AC     Progress/Outcome (Dressing Goal 1, OT) goal ongoing  -     Row Name 12/06/21 0821          Toileting Goal 1 (OT)    Activity/Device (Toileting Goal 1, OT) adjust/manage clothing; perform perineal hygiene  -AC     [place] Level/Cues Needed (Toileting Goal 1, OT) supervision required  -AC     Time Frame (Toileting Goal 1, OT) by  discharge  -     Progress/Outcome (Toileting Goal 1, OT) goal ongoing  -     Row Name 12/06/21 0821          Therapy Assessment/Plan (OT)    Planned Therapy Interventions (OT) activity tolerance training; BADL retraining; functional balance retraining; occupation/activity based interventions; patient/caregiver education/training; strengthening exercise; transfer/mobility retraining  -           User Key  (r) = Recorded By, (t) = Taken By, (c) = Cosigned By    Initials Name Provider Type     Yolanda Staley, OT Occupational Therapist               Clinical Impression     Row Name 12/06/21 0814          Pain Assessment    Additional Documentation Pain Scale: Numbers Pre/Post-Treatment (Group)  -     Row Name 12/06/21 0814          Pain Scale: Numbers Pre/Post-Treatment    Pretreatment Pain Rating 8/10  -     Posttreatment Pain Rating 8/10  -     Pain Location - Side Left  -AC     Pain Location - Orientation upper  -     Pain Location extremity  -     Pain Intervention(s) Repositioned  pt declined to elevate arm on pillow  -     Row Name 12/06/21 0814          Plan of Care Review    Plan of Care Reviewed With patient  -     Outcome Summary OT eval complete.  Pt presents with L arm pain, weakness and decresed activity toelrrance limiting independence with self care. Pt mod ind supine to sit, min A to don socks, supervision STS,  CGA to ambulate in room without A.  OT will follow to advance pt toward increased independence with self care.  Recommend home upon d/c.  -     Row Name 12/06/21 0814          Therapy Assessment/Plan (OT)    Rehab Potential (OT) good, to achieve stated therapy goals  -     Criteria for Skilled Therapeutic Interventions Met (OT) yes; skilled treatment is necessary  -     Therapy Frequency (OT) daily  -     Row Name 12/06/21 0814          Therapy Plan Review/Discharge Plan (OT)    Anticipated Discharge Disposition (OT) home  -     Row Name 12/06/21 0814           Vital Signs    Post Systolic BP Rehab 108  -AC     Post Treatment Diastolic BP 65  -AC     Posttreatment Heart Rate (beats/min) 62  -AC     Post SpO2 (%) 92  -AC     O2 Delivery Post Treatment room air  -AC     Pre Patient Position Supine  -AC     Intra Patient Position Standing  -AC     Post Patient Position Sitting  -AC     Row Name 12/06/21 0814          Positioning and Restraints    Pre-Treatment Position in bed  -AC     Post Treatment Position chair  -AC     In Chair reclined; call light within reach; encouraged to call for assist; exit alarm on; waffle cushion  -           User Key  (r) = Recorded By, (t) = Taken By, (c) = Cosigned By    Initials Name Provider Type    Yolanda Concepcion, OT Occupational Therapist               Outcome Measures     Row Name 12/06/21 0826          How much help from another is currently needed...    Putting on and taking off regular lower body clothing? 3  -AC     Bathing (including washing, rinsing, and drying) 3  -AC     Toileting (which includes using toilet bed pan or urinal) 3  -AC     Putting on and taking off regular upper body clothing 3  -AC     Taking care of personal grooming (such as brushing teeth) 4  -AC     Eating meals 4  -AC     AM-PAC 6 Clicks Score (OT) 20  -AC     Row Name 12/06/21 0826          Functional Assessment    Outcome Measure Options AM-PAC 6 Clicks Daily Activity (OT)  -AC           User Key  (r) = Recorded By, (t) = Taken By, (c) = Cosigned By    Initials Name Provider Type    Yolanda Concepcion, ALAINA Occupational Therapist                Occupational Therapy Education                 Title: PT OT SLP Therapies (In Progress)     Topic: Occupational Therapy (In Progress)     Point: ADL training (Done)     Description:   Instruct learner(s) on proper safety adaptation and remediation techniques during self care or transfers.   Instruct in proper use of assistive devices.              Learning Progress Summary           Patient Acceptance, E, VU by LUZ  at 12/6/2021 0828    Comment: Role of OT, benefits of activity                   Point: Home exercise program (Not Started)     Description:   Instruct learner(s) on appropriate technique for monitoring, assisting and/or progressing therapeutic exercises/activities.              Learner Progress:  Not documented in this visit.          Point: Precautions (Not Started)     Description:   Instruct learner(s) on prescribed precautions during self-care and functional transfers.              Learner Progress:  Not documented in this visit.          Point: Body mechanics (Not Started)     Description:   Instruct learner(s) on proper positioning and spine alignment during self-care, functional mobility activities and/or exercises.              Learner Progress:  Not documented in this visit.                      User Key     Initials Effective Dates Name Provider Type Discipline     06/16/21 -  Yolanda Staley, OT Occupational Therapist OT              OT Recommendation and Plan  Planned Therapy Interventions (OT): activity tolerance training, BADL retraining, functional balance retraining, occupation/activity based interventions, patient/caregiver education/training, strengthening exercise, transfer/mobility retraining  Therapy Frequency (OT): daily  Plan of Care Review  Plan of Care Reviewed With: patient  Outcome Summary: OT eval complete.  Pt presents with L arm pain, weakness and decresed activity toelrrance limiting independence with self care. Pt mod ind supine to sit, min A to don socks, supervision STS,  CGA to ambulate in room without A.  OT will follow to advance pt toward increased independence with self care.  Recommend home upon d/c.     Time Calculation:    Time Calculation- OT     Row Name 12/06/21 0740             Time Calculation- OT    OT Start Time 0740  -      OT Received On 12/06/21  -      OT Goal Re-Cert Due Date 12/16/21  -              Untimed Charges    OT Eval/Re-eval Minutes 55  -AC               Total Minutes    Untimed Charges Total Minutes 55  -AC       Total Minutes 55  -AC            User Key  (r) = Recorded By, (t) = Taken By, (c) = Cosigned By    Initials Name Provider Type    AC Yolanda Staley OT Occupational Therapist              Therapy Charges for Today     Code Description Service Date Service Provider Modifiers Qty    [id number]  OT EVAL LOW COMPLEXITY 4 12/6/2021 Yolanda Staley OT GO 1               Yolanda Staley OT  12/6/2021

## 2021-12-06 NOTE — PLAN OF CARE
Goal Outcome Evaluation:         Rested well overnight. Good UOP. Several Bms after first round of prep. RN repeatedly encouraging pt to drink 2nd round of prep but pt is refusing currently. Will keep trying to encourage pt to drink it. VSS. 2L NC overnight.

## 2021-12-06 NOTE — BRIEF OP NOTE
COLONOSCOPY  Progress Note    Cal A High  12/6/2021    Colonoscopy reveals left-sided diverticulosis.  2 polyps removed from the ascending colon with cold snare, and 3 endoclips applied to ensure hemostasis.  1 polyp removed from descending colon with cold snare, and 2 endoclips applied to ensure hemostasis.  Grade 1 internal hemorrhoids are noted.  No blood or source of blood loss seen.    Suspect some of patient's blood loss is due to an apparent left thigh hematoma.    >> We will proceed with small bowel video capsule endoscopy.    Mark I. Brunner, MD     Date: 12/6/2021  Time: 13:46 EST

## 2021-12-06 NOTE — ANESTHESIA POSTPROCEDURE EVALUATION
Patient: Cal Frausto    Procedure Summary     Date: 12/06/21 Room / Location:  [facility]    Anesthesia Start: 1302 Anesthesia Stop: 1346    Procedure: COLONOSCOPY (N/A ) Diagnosis:       Anemia, unspecified type      (Anemia, unspecified type [D64.9])    Surgeons: Brunner, Mark I, MD Provider: Juan Durand MD    Anesthesia Type: general, MAC ASA Status: 4          Anesthesia Type: general, MAC    Vitals  Vitals Value Taken Time   BP     Temp     Pulse     Resp     SpO2 99 % 12/06/21 1346           Post Anesthesia Care and Evaluation    Patient location during evaluation: PACU  Patient participation: waiting for patient participation  Level of consciousness: lethargic and responsive to physical stimuli  Pain management: adequate  Airway patency: patent  Anesthetic complications: No anesthetic complications  PONV Status: none  Cardiovascular status: hemodynamically stable and acceptable  Respiratory status: nonlabored ventilation, acceptable and nasal cannula  Hydration status: acceptable

## 2021-12-06 NOTE — CASE MANAGEMENT/SOCIAL WORK
Discharge Planning Assessment  [facility]     Patient Name: Cal Frausto  MRN: [id number]  Today's Date: 12/6/2021    Admit Date: 12/4/2021     Discharge Needs Assessment     Row Name 12/06/21 0855       Living Environment    Lives With alone    Current Living Arrangements home/apartment/condo    Primary Care Provided by self    Provides Primary Care For no one    Family Caregiver if Needed child(reed), adult    Family Caregiver Names Elizabeth Faulkner (daughter) [phone]    Able to Return to Prior Arrangements yes       Resource/Environmental Concerns    Resource/Environmental Concerns none    Transportation Concerns car, none       Transition Planning    Patient/Family Anticipates Transition to home    Patient/Family Anticipated Services at Transition none    Transportation Anticipated family or friend will provide       Discharge Needs Assessment    Readmission Within the Last 30 Days no previous admission in last 30 days    Equipment Currently Used at Home oxygen    Concerns to be Addressed denies needs/concerns at this time    Anticipated Changes Related to Illness none    Equipment Needed After Discharge none               Discharge Plan     Row Name 12/06/21 0856       Plan    Plan Home    Patient/Family in Agreement with Plan yes    Plan Comments Spoke with patient at bedside. Lives with  in [place]. Contact is Elizabeth Faulkner (daughter) [phone]. Is independent with ADL's. No problems with Medicare/AARP or medications. Uses oxygen HS from [employer] at home. Has a POA. Plan is home. CM will continue to follow.    Final Discharge Disposition Code 01 - home or self-care              Continued Care and Services - Admitted Since 12/4/2021    Coordination has not been started for this encounter.          Demographic Summary     Row Name 12/06/21 0855       General Information    Admission Type inpatient    Arrived From emergency department    Referral Source admission list    Reason for Consult  discharge planning    Preferred Language English     Used During This Interaction no       Contact Information    Permission Granted to Share Info With     Contact Information Obtained for     Contact Information Comments PCP is Harsha Smith MD               Functional Status     Row Name 12/06/21 0855       Functional Status    Usual Activity Tolerance good    Current Activity Tolerance good       Functional Status, IADL    Medications independent    Meal Preparation independent    Housekeeping independent    Laundry independent    Shopping independent       Mental Status    General Appearance WDL WDL       Mental Status Summary    Recent Changes in Mental Status/Cognitive Functioning no changes       Employment/    Employment Status retired               Psychosocial    No documentation.                Abuse/Neglect    No documentation.                Legal    No documentation.                Substance Abuse    No documentation.                Patient Forms    No documentation.                   Steffen Holliday RN

## 2021-12-06 NOTE — NURSING NOTE
Pt ingested pill, tolerated well. Instructions covered with pt and nurse. Pill cam finished at 8pm

## 2021-12-07 ENCOUNTER — READMISSION MANAGEMENT (OUTPATIENT)
Dept: CALL CENTER | Facility: HOSPITAL | Age: 76
End: 2021-12-07

## 2021-12-07 VITALS
HEIGHT: 66 IN | DIASTOLIC BLOOD PRESSURE: 73 MMHG | TEMPERATURE: 98.8 F | RESPIRATION RATE: 20 BRPM | WEIGHT: 165 LBS | HEART RATE: 59 BPM | BODY MASS INDEX: 26.52 KG/M2 | SYSTOLIC BLOOD PRESSURE: 118 MMHG | OXYGEN SATURATION: 90 %

## 2021-12-07 LAB
BASOPHILS # BLD AUTO: 0.03 10*3/MM3 (ref 0–0.2)
BASOPHILS NFR BLD AUTO: 0.6 % (ref 0–1.5)
CYTO UR: NORMAL
DEPRECATED RDW RBC AUTO: 53.6 FL (ref 37–54)
EOSINOPHIL # BLD AUTO: 0.13 10*3/MM3 (ref 0–0.4)
EOSINOPHIL NFR BLD AUTO: 2.8 % (ref 0.3–6.2)
ERYTHROCYTE [DISTWIDTH] IN BLOOD BY AUTOMATED COUNT: 18.6 % (ref 12.3–15.4)
HCT VFR BLD AUTO: 30.4 % (ref 37.5–51)
HGB BLD-MCNC: 9.5 G/DL (ref 13–17.7)
IMM GRANULOCYTES # BLD AUTO: 0.11 10*3/MM3 (ref 0–0.05)
IMM GRANULOCYTES NFR BLD AUTO: 2.4 % (ref 0–0.5)
INR PPP: 1.41 (ref 0.85–1.16)
LAB AP CASE REPORT: NORMAL
LAB AP CLINICAL INFORMATION: NORMAL
LYMPHOCYTES # BLD AUTO: 1.35 10*3/MM3 (ref 0.7–3.1)
LYMPHOCYTES NFR BLD AUTO: 29 % (ref 19.6–45.3)
MCH RBC QN AUTO: 28.7 PG (ref 26.6–33)
MCHC RBC AUTO-ENTMCNC: 31.3 G/DL (ref 31.5–35.7)
MCV RBC AUTO: 91.8 FL (ref 79–97)
MONOCYTES # BLD AUTO: 0.33 10*3/MM3 (ref 0.1–0.9)
MONOCYTES NFR BLD AUTO: 7.1 % (ref 5–12)
NEUTROPHILS NFR BLD AUTO: 2.71 10*3/MM3 (ref 1.7–7)
NEUTROPHILS NFR BLD AUTO: 58.1 % (ref 42.7–76)
NRBC BLD AUTO-RTO: 1.7 /100 WBC (ref 0–0.2)
PATH REPORT.FINAL DX SPEC: NORMAL
PATH REPORT.GROSS SPEC: NORMAL
PLATELET # BLD AUTO: 226 10*3/MM3 (ref 140–450)
PMV BLD AUTO: 9.1 FL (ref 6–12)
PROTHROMBIN TIME: 16.7 SECONDS (ref 11.4–14.4)
RBC # BLD AUTO: 3.31 10*6/MM3 (ref 4.14–5.8)
WBC NRBC COR # BLD: 4.66 10*3/MM3 (ref 3.4–10.8)

## 2021-12-07 PROCEDURE — 99239 HOSP IP/OBS DSCHRG MGMT >30: CPT | Performed by: INTERNAL MEDICINE

## 2021-12-07 PROCEDURE — 85025 COMPLETE CBC W/AUTO DIFF WBC: CPT | Performed by: INTERNAL MEDICINE

## 2021-12-07 PROCEDURE — 25010000002 CEFTRIAXONE PER 250 MG: Performed by: INTERNAL MEDICINE

## 2021-12-07 PROCEDURE — 25010000002 HYDROMORPHONE PER 4 MG: Performed by: INTERNAL MEDICINE

## 2021-12-07 PROCEDURE — 85610 PROTHROMBIN TIME: CPT | Performed by: INTERNAL MEDICINE

## 2021-12-07 PROCEDURE — 97161 PT EVAL LOW COMPLEX 20 MIN: CPT | Performed by: PHYSICAL THERAPIST

## 2021-12-07 RX ORDER — CEFUROXIME AXETIL 500 MG/1
250 TABLET ORAL 2 TIMES DAILY
Qty: 3 TABLET | Refills: 0 | Status: SHIPPED | OUTPATIENT
Start: 2021-12-07 | End: 2021-12-10

## 2021-12-07 RX ADMIN — FINASTERIDE 5 MG: 5 TABLET, FILM COATED ORAL at 10:40

## 2021-12-07 RX ADMIN — HYDROMORPHONE HYDROCHLORIDE 0.5 MG: 1 INJECTION, SOLUTION INTRAMUSCULAR; INTRAVENOUS; SUBCUTANEOUS at 10:45

## 2021-12-07 RX ADMIN — PANTOPRAZOLE SODIUM 40 MG: 40 INJECTION, POWDER, FOR SOLUTION INTRAVENOUS at 06:13

## 2021-12-07 RX ADMIN — LEVOTHYROXINE SODIUM 112 MCG: 112 TABLET ORAL at 06:14

## 2021-12-07 RX ADMIN — ATENOLOL 50 MG: 50 TABLET ORAL at 10:40

## 2021-12-07 RX ADMIN — THYROID, PORCINE 15 MG: 30 TABLET ORAL at 10:45

## 2021-12-07 RX ADMIN — ISOSORBIDE MONONITRATE 60 MG: 60 TABLET, EXTENDED RELEASE ORAL at 10:40

## 2021-12-07 RX ADMIN — SODIUM CHLORIDE 1 G: 900 INJECTION INTRAVENOUS at 10:40

## 2021-12-07 NOTE — PROGRESS NOTES
[facility] Medicine Services  PROGRESS NOTE    Patient Name: Cal Frausto  : 1945  MRN: [id number]    Date of Admission: 2021  Primary Care Physician: Harsha Smith MD    Subjective   Subjective     CC:  F/u anemia, bruising, supratherapeutic INR    HPI:  Patient sitting in bedside chair. No complaints, feels well. Hematoma in arm and leg are slightly uncomfortable.    ROS:  Gen- No fevers, chills  CV- No chest pain, palpitations  Resp- No cough, dyspnea  GI- No N/V/D, abd pain     Objective   Objective     Vital Signs:   Temp:  [97.3 °F (36.3 °C)-98.8 °F (37.1 °C)] 98.8 °F (37.1 °C)  Heart Rate:  [59-97] 59  Resp:  [18-20] 20  BP: (118-156)/(58-90) 118/73  Flow (L/min):  [2] 2     Physical Exam:  Constitutional: No acute distress, awake, alert  HENT: NCAT, mucous membranes moist  Respiratory: Clear to auscultation bilaterally, respiratory effort normal   Cardiovascular: RRR, no murmurs, rubs, or gallops  Gastrointestinal: Positive bowel sounds, soft, nontender, nondistended  Musculoskeletal: No bilateral ankle edema, large left arm hematoma extending from left wrist to left upper arm  Psychiatric: Appropriate affect, cooperative  Neurologic: Oriented x 3, strength symmetric in all extremities, Cranial Nerves grossly intact to confrontation, speech clear  Skin: No rashes    Exam unchanged from     Results Reviewed:  LAB RESULTS:      Lab 21  0625 21  1547 21  0859 21  2337 21  1642 21  0708 21  0708 21  0023 21  0023 21  1658 21  1658 21  1349 21  1219   WBC 4.66  --  5.23  --   --   --  6.40  --   --   --   --   --  8.17   HEMOGLOBIN 9.5* 8.6* 9.0* 8.4* 8.4*   < > 7.6*  7.6*   < > 6.5*   < > 5.5*   < > 5.7*   HEMATOCRIT 30.4* 26.6* 27.7* 25.4* 26.7*   < > 22.9*  22.9*   < > 19.9*   < > 17.7*   < > 17.8*   PLATELETS 226  --  220  --   --   --  233  --   --   --   --   --  314   NEUTROS  ABS 2.71  --  3.21  --   --   --  4.12  --   --   --   --   --  6.11   IMMATURE GRANS (ABS) 0.11*  --  0.11*  --   --   --  0.17*  --   --   --   --   --  0.22*   LYMPHS ABS 1.35  --  1.24  --   --   --  1.42  --   --   --   --   --  1.19   MONOS ABS 0.33  --  0.49  --   --   --  0.52  --   --   --   --   --  0.59   EOS ABS 0.13  --  0.15  --   --   --  0.14  --   --   --   --   --  0.04   MCV 91.8  --  88.5  --   --   --  87.7  --   --   --   --   --  86.0   PROTIME 16.7*  --  14.5*  --   --   --  15.1*  --  15.0*  --  15.9*   < > >120.0*    < > = values in this interval not displayed.         Lab 12/06/21  0859 12/05/21  0708 12/04/21  1349   SODIUM 137 131* 130*  129*   POTASSIUM 4.1 4.5 4.2  4.2   CHLORIDE 106 101 95*  94*   CO2 22.0 24.0 22.0  23.0   ANION GAP 9.0 6.0 13.0  12.0   BUN 15 20 23  23   CREATININE 1.26 1.51* 1.57*  1.56*   GLUCOSE 126* 134* 288*  280*   CALCIUM 7.8* 7.5* 8.4*  8.1*         Lab 12/05/21  0708 12/04/21  1349   TOTAL PROTEIN 4.5* 6.2   ALBUMIN 2.70* 3.30*   GLOBULIN 1.8 2.9   ALT (SGPT) 9 15   AST (SGOT) 15 20   BILIRUBIN 0.9 0.5   ALK PHOS 36* 44         Lab 12/07/21  0625 12/06/21  0859 12/05/21  0708 12/05/21  0023 12/04/21  1658   PROTIME 16.7* 14.5* 15.1* 15.0* 15.9*   INR 1.41* 1.16 1.23* 1.22* 1.31*             Lab 12/05/21  0023 12/04/21  1349   IRON  --  96   IRON SATURATION  --  28   TIBC  --  347   TRANSFERRIN  --  233   VITAMIN B 12 323  --    ABO TYPING  --  B   RH TYPING  --  Positive   ANTIBODY SCREEN  --  Negative         Brief Urine Lab Results  (Last result in the past 365 days)      Color   Clarity   Blood   Leuk Est   Nitrite   Protein   CREAT   Urine HCG        12/04/21 1515 Orange   Cloudy   Large (3+)   Negative   Negative   30 mg/dL (1+)                 Microbiology Results Abnormal     Procedure Component Value - Date/Time    COVID PRE-OP / PRE-PROCEDURE SCREENING ORDER (NO ISOLATION) - Swab, Nasopharynx [[id number]] Collected: 12/04/21 1645    Lab  Status: Final result Specimen: Swab from Nasopharynx Updated: 12/05/21 1806    Narrative:      The following orders were created for panel order COVID PRE-OP / PRE-PROCEDURE SCREENING ORDER (NO ISOLATION) - Swab, Nasopharynx.  Procedure                               Abnormality         Status                     ---------                               -----------         ------                     COVID-19 PCR, [facility]...[[id number]]                      Final result                 Please view results for these tests on the individual orders.    COVID-19 PCR, [facility], NP SWAB IN LEXAR VIRAL TRANSPORT MEDIA/ORAL SWISH 24-30 HR TAT - Swab, Nasopharynx [[id number]] Collected: 12/04/21 1645    Lab Status: Final result Specimen: Swab from Nasopharynx Updated: 12/05/21 1806     SARS-CoV-2 TORIE Not Detected    COVID PRE-OP / PRE-PROCEDURE SCREENING ORDER (NO ISOLATION) - Swab, Nasopharynx [[id number]]  (Normal) Collected: 12/05/21 1112    Lab Status: Final result Specimen: Swab from Nasopharynx Updated: 12/05/21 1305    Narrative:      The following orders were created for panel order COVID PRE-OP / PRE-PROCEDURE SCREENING ORDER (NO ISOLATION) - Swab, Nasopharynx.  Procedure                               Abnormality         Status                     ---------                               -----------         ------                     COVID-19, ABBOTT IN-HOUS...[[id number]]  Normal              Final result                 Please view results for these tests on the individual orders.    COVID-19, ABBOTT IN-HOUSE,NASAL Swab (NO TRANSPORT MEDIA) 2 HR TAT - Swab, Nasopharynx [[id number]]  (Normal) Collected: 12/05/21 1112    Lab Status: Final result Specimen: Swab from Nasopharynx Updated: 12/05/21 1305     COVID19 Presumptive Negative    Narrative:      Fact sheet for providers: https://www.fda.gov/media/863541/download     Fact sheet for patients: https://www.fda.gov/media/543722/download    Test performed by PCR.  If  inconsistent with clinical signs and symptoms patient should be tested with different authorized molecular test.          No radiology results from the last 24 hrs        I have reviewed the medications:  Scheduled Meds:atenolol, 50 mg, Oral, Daily  cefTRIAXone, 1 g, Intravenous, Q24H  finasteride, 5 mg, Oral, Daily  isosorbide mononitrate, 60 mg, Oral, Daily  levothyroxine, 112 mcg, Oral, Q AM  O2, 2 L/min, Inhalation, Nightly  pantoprazole, 40 mg, Intravenous, Q12H  rosuvastatin, 40 mg, Oral, Nightly  sodium chloride, 10 mL, Intravenous, Q12H  thyroid, 15 mg, Oral, Daily      Continuous Infusions:   PRN Meds:.•  acetaminophen  •  HYDROmorphone  •  sodium chloride    Assessment/Plan   Assessment & Plan     Active Hospital Problems    Diagnosis  POA   • **Anemia [D64.9]  Yes   • Hx of renal cell cancer. Left nephrectomy 10/2019 [Z85.528]  Not Applicable   • Coronary artery disease involving coronary bypass graft of native heart without angina pectoris [I25.810]  Yes   • History DVT left leg 2016 on chronic anticoagulation with Coumadin (CMS/[facility]) [I82.409]  Yes   • COPD in active smoker. On home oxygen (CMS/[facility]) [J44.9]  Yes   • Dyslipidemia [E78.5]  Yes   • Type 2 diabetes mellitus. HbA1c 11.2 (CMS/[facility]) [E11.9]  Yes   • CAD involving coronary bypass graft of native heart. CABG 2004, stent October 2019 [I25.810]  Yes   • Essential hypertension [I10]  Yes      Resolved Hospital Problems   No resolved problems to display.        Brief Hospital Course to date:  Cal Frausto is a 76 y.o. male with past medical history of coronary artery disease status post CABG x3 in 2004, history of left lower extremity DVT in 2016 on anticoagulation with Coumadin, essential hypertension, dyslipidemia, AAA status post endovascular repair in 2020, type 2 diabetes, ongoing tobacco use, renal cell carcinoma status post left nephrectomy 2019, COPD on nocturnal oxygen, arthritis, hypothyroidism who presented to the hospital today brought  by his daughter for concern of left arm swelling, blue discoloration and tenderness for 1 week. He was found to be severely anemic with hemoglobin 5.7 and with INR more than 10    Acute blood loss anemia  Severe symptomatic anemia  Supratherapeutic INR  - s/p Kcentra and IV vitamin K with improvement in INR (>10 on admission)  - holding coumadin for now, resume when okay with GI  - s/p 4 units PRBCs  - IV PPI BID  - serial H&H, transfuse Hgb <7  - s/p EGD on 12/5 without clear source of bleeding, C-scope showed diverticulosis, grade I internal hemorrhoids, no obvious source of blood loss seen, pill cam placed and results are pending.     Hematuria - resolved  UTI  - The patient mentioned that he had cystoscopy done 3 months ago with his urologist after part of his routine follow-up after his nephrectomy for left renal cancer and according to him it was negative study.  - UA with 2+ bacteria, continue rocephin, no urine culture sent  - likely secondary to supratherapeutic INR and has now resolved     Left arm and left thigh ecchymosis and tenderness  - Cool compresses  - PRN pain control      CAD s/p CABG    History of left lower extremity DVT in 2016  - coumadin on hold for now    Essential hypertension   Dyslipidemia  COPD, on nocturnal oxygen  Hypothyroidism  - All those medical conditions are chronic and stable, home meds resumed as appropriate    DVT prophylaxis:  Mechanical DVT prophylaxis orders are present.       AM-PAC 6 Clicks Score (PT): 24 (12/07/21 0930)    Disposition: I expect the patient to be discharged pending results of GI work-up and resumption of coumadin.    CODE STATUS:   Code Status and Medical Interventions:   Ordered at: 12/04/21 1512     Level Of Support Discussed With:    Patient     Code Status (Patient has no pulse and is not breathing):    CPR (Attempt to Resuscitate)     Medical Interventions (Patient has pulse or is breathing):    Full Support       Mary Lucero,  DO  12/07/21

## 2021-12-07 NOTE — DISCHARGE SUMMARY
[facility] Medicine Services  DISCHARGE SUMMARY    Patient Name: Cal Frausto  : 1945  MRN: [id number]    Date of Admission: 2021 12:05 PM  Date of Discharge:  2021  Primary Care Physician: Harsha Smith MD    Consults     Date and Time Order Name Status Description    2021  3:26 PM Inpatient Gastroenterology Consult Completed           Hospital Course     Presenting Problem:   Anemia [D64.9]    Active Hospital Problems    Diagnosis  POA   • **Anemia [D64.9]  Yes   • Hx of renal cell cancer. Left nephrectomy 10/2019 [Z85.528]  Not Applicable   • Coronary artery disease involving coronary bypass graft of native heart without angina pectoris [I25.810]  Yes   • History DVT left leg  on chronic anticoagulation with Coumadin (CMS/[facility]) [I82.409]  Yes   • COPD in active smoker. On home oxygen (CMS/[facility]) [J44.9]  Yes   • Dyslipidemia [E78.5]  Yes   • Type 2 diabetes mellitus. HbA1c 11.2 (CMS/[facility]) [E11.9]  Yes   • CAD involving coronary bypass graft of native heart. CABG , stent 2019 [I25.810]  Yes   • Essential hypertension [I10]  Yes      Resolved Hospital Problems   No resolved problems to display.          Hospital Course:  Cal Frausto is a 76 y.o. male with past medical history of coronary artery disease status post CABG x3 in , history of left lower extremity DVT in  on anticoagulation with Coumadin, essential hypertension, dyslipidemia, AAA status post endovascular repair in , type 2 diabetes, ongoing tobacco use, renal cell carcinoma status post left nephrectomy , COPD on nocturnal oxygen, arthritis, hypothyroidism who presented to the hospital today brought by his daughter for concern of left arm swelling, blue discoloration and tenderness for 1 week. He was found to be severely anemic with hemoglobin 5.7 and with INR more than 10    Acute blood loss anemia  Severe symptomatic anemia  Supratherapeutic INR  - s/p Kcentra and  IV vitamin K with improvement in INR (>10 on admission)  - holding coumadin for now, resumed per below  - s/p 4 units PRBCs, hemoglobin remains stable at discharge  - s/p EGD on 12/5 without clear source of bleeding/otherwise normal, C-scope showed diverticulosis, grade I internal hemorrhoids, no obvious source of blood loss seen, pill cam was also normal. Polyps removed during c-scope are tubular adenomas, per Dr. Brunner will need surveillance C-scope in 3 years.     Hematuria - resolved  UTI  - reportedly had cystoscopy done 3 months ago as part of routine follow-up after his nephrectomy for left renal cancer and according to patient it was a negative study. Hematuria was likely secondary to supratherapeutic INR in setting of infection  - UA with 2+ bacteria, treated with CTX while inpatient, switch to ceftin to complete course PO.     Left arm and left thigh Hematoma  - Cool compresses  - PRN pain control   - hold AC until PCP follow up     History of left lower extremity DVT in 2016  - this was an unprovoked DVT in setting of chronic smoking( 2016) which is why he has been left on Coumadin. No cardiac reason for anticoagulation that I could find in chart review. I called and discussed this with his primary care physician Dr. Smith. I discussed leaving patient off coumadin at discharge given his large hematomas (on left arm/left thigh), unclear the risk vs. Benefits of leaving him on coumadin with fall risk. Could consider switching to DOAC if anticoagulation felt necessary and it is affordable to him. Consider hematology input regarding risk and need for continued use. Dr. Smith plans to see him in f/u on Thursday.     Essential hypertension   Dyslipidemia  COPD, on nocturnal oxygen  Hypothyroidism  CAD s/p CABG       Discharge Follow Up Recommendations for outpatient labs/diagnostics:   - f/u with Dr. Smith on 12/9 to discuss resumption of anticoagulation. I have called and discussed this with him ahead  of time. Also called and discussed this with his daughter.   - need surveillance colonoscopy in 3 years    Day of Discharge     HPI:   Patient sitting up in bedside chair, really wanting to go home. Feels fine. No issues overnight. No bleeding. Completed pill cam.    Review of Systems  Gen- No fevers, chills  CV- No chest pain, palpitations  Resp- No cough, dyspnea  GI- No N/V/D, abd pain    Vital Signs:   Temp:  [98.8 °F (37.1 °C)] 98.8 °F (37.1 °C)  Heart Rate:  [59-97] 59  Resp:  [18-20] 20  BP: (118-138)/(66-75) 118/73     Physical Exam:  Constitutional: No acute distress, awake, alert  HENT: NCAT, mucous membranes moist  Respiratory: Clear to auscultation bilaterally, respiratory effort normal   Cardiovascular: RRR, no murmurs, rubs, or gallops  Gastrointestinal: Positive bowel sounds, soft, nontender, nondistended  Musculoskeletal: No bilateral ankle edema, large hematoma over left thigh and left arm  Psychiatric: Appropriate affect, cooperative  Neurologic: Oriented x 3, strength symmetric in all extremities, Cranial Nerves grossly intact to confrontation, speech clear  Skin: No rashes      Pertinent  and/or Most Recent Results     LAB RESULTS:      Lab 12/07/21  0625 12/06/21  1547 12/06/21  0859 12/05/21  2337 12/05/21  1642 12/05/21  0708 12/05/21  0708 12/05/21  0023 12/05/21  0023 12/04/21  1658 12/04/21  1658 12/04/21  1349 12/04/21  1219   WBC 4.66  --  5.23  --   --   --  6.40  --   --   --   --   --  8.17   HEMOGLOBIN 9.5* 8.6* 9.0* 8.4* 8.4*   < > 7.6*  7.6*   < > 6.5*   < > 5.5*   < > 5.7*   HEMATOCRIT 30.4* 26.6* 27.7* 25.4* 26.7*   < > 22.9*  22.9*   < > 19.9*   < > 17.7*   < > 17.8*   PLATELETS 226  --  220  --   --   --  233  --   --   --   --   --  314   NEUTROS ABS 2.71  --  3.21  --   --   --  4.12  --   --   --   --   --  6.11   IMMATURE GRANS (ABS) 0.11*  --  0.11*  --   --   --  0.17*  --   --   --   --   --  0.22*   LYMPHS ABS 1.35  --  1.24  --   --   --  1.42  --   --   --   --   --   1.19   MONOS ABS 0.33  --  0.49  --   --   --  0.52  --   --   --   --   --  0.59   EOS ABS 0.13  --  0.15  --   --   --  0.14  --   --   --   --   --  0.04   MCV 91.8  --  88.5  --   --   --  87.7  --   --   --   --   --  86.0   PROTIME 16.7*  --  14.5*  --   --   --  15.1*  --  15.0*  --  15.9*   < > >120.0*    < > = values in this interval not displayed.         Lab 12/06/21  0859 12/05/21  0708 12/04/21  1349   SODIUM 137 131* 130*  129*   POTASSIUM 4.1 4.5 4.2  4.2   CHLORIDE 106 101 95*  94*   CO2 22.0 24.0 22.0  23.0   ANION GAP 9.0 6.0 13.0  12.0   BUN 15 20 23  23   CREATININE 1.26 1.51* 1.57*  1.56*   GLUCOSE 126* 134* 288*  280*   CALCIUM 7.8* 7.5* 8.4*  8.1*         Lab 12/05/21  0708 12/04/21  1349   TOTAL PROTEIN 4.5* 6.2   ALBUMIN 2.70* 3.30*   GLOBULIN 1.8 2.9   ALT (SGPT) 9 15   AST (SGOT) 15 20   BILIRUBIN 0.9 0.5   ALK PHOS 36* 44         Lab 12/07/21  0625 12/06/21  0859 12/05/21  0708 12/05/21  0023 12/04/21  1658   PROTIME 16.7* 14.5* 15.1* 15.0* 15.9*   INR 1.41* 1.16 1.23* 1.22* 1.31*             Lab 12/05/21  0023 12/04/21  1349   IRON  --  96   IRON SATURATION  --  28   TIBC  --  347   TRANSFERRIN  --  233   VITAMIN B 12 323  --    ABO TYPING  --  B   RH TYPING  --  Positive   ANTIBODY SCREEN  --  Negative         Brief Urine Lab Results  (Last result in the past 365 days)      Color   Clarity   Blood   Leuk Est   Nitrite   Protein   CREAT   Urine HCG        12/04/21 1515 Orange   Cloudy   Large (3+)   Negative   Negative   30 mg/dL (1+)               Microbiology Results (last 10 days)     Procedure Component Value - Date/Time    COVID PRE-OP / PRE-PROCEDURE SCREENING ORDER (NO ISOLATION) - Swab, Nasopharynx [[id number]]  (Normal) Collected: 12/05/21 1112    Lab Status: Final result Specimen: Swab from Nasopharynx Updated: 12/05/21 1305    Narrative:      The following orders were created for panel order COVID PRE-OP / PRE-PROCEDURE SCREENING ORDER (NO ISOLATION) - Swab,  Nasopharynx.  Procedure                               Abnormality         Status                     ---------                               -----------         ------                     COVID-19, ABBOTT IN-HOUS...[[id number]]  Normal              Final result                 Please view results for these tests on the individual orders.    COVID-19, ABBOTT IN-HOUSE,NASAL Swab (NO TRANSPORT MEDIA) 2 HR TAT - Swab, Nasopharynx [[id number]]  (Normal) Collected: 12/05/21 1112    Lab Status: Final result Specimen: Swab from Nasopharynx Updated: 12/05/21 1305     COVID19 Presumptive Negative    Narrative:      Fact sheet for providers: https://www.fda.gov/media/750451/download     Fact sheet for patients: https://www.fda.gov/media/478401/download    Test performed by PCR.  If inconsistent with clinical signs and symptoms patient should be tested with different authorized molecular test.    COVID PRE-OP / PRE-PROCEDURE SCREENING ORDER (NO ISOLATION) - Swab, Nasopharynx [[id number]] Collected: 12/04/21 1645    Lab Status: Final result Specimen: Swab from Nasopharynx Updated: 12/05/21 1806    Narrative:      The following orders were created for panel order COVID PRE-OP / PRE-PROCEDURE SCREENING ORDER (NO ISOLATION) - Swab, Nasopharynx.  Procedure                               Abnormality         Status                     ---------                               -----------         ------                     COVID-19 PCR, [facility]...[[id number]]                      Final result                 Please view results for these tests on the individual orders.    COVID-19 PCR, [facility], NP SWAB IN LEXAR VIRAL TRANSPORT MEDIA/ORAL SWISH 24-30 HR TAT - Swab, Nasopharynx [[id number]] Collected: 12/04/21 1645    Lab Status: Final result Specimen: Swab from Nasopharynx Updated: 12/05/21 1806     SARS-CoV-2 TORIE Not Detected          No radiology results for the last 10 days    Results for orders placed during the hospital encounter of  09/19/16    Duplex Venous Lower Extremity - Left CAR    Interpretation Summary  · Acute left lower extremity deep vein thrombosis noted in the posterial tibial.  · Acute left lower extremity superficial thrombophlebitis noted in the greater saphenous (above knee) and varicosity (below knee).  · All other left sided veins appeared normal.  · Chronic left lower extremity superficial thrombophlebitis noted in the greater saphenous (below knee).      Results for orders placed during the hospital encounter of 09/19/16    Duplex Venous Lower Extremity - Left CAR    Interpretation Summary  · Acute left lower extremity deep vein thrombosis noted in the posterial tibial.  · Acute left lower extremity superficial thrombophlebitis noted in the greater saphenous (above knee) and varicosity (below knee).  · All other left sided veins appeared normal.  · Chronic left lower extremity superficial thrombophlebitis noted in the greater saphenous (below knee).          Plan for Follow-up of Pending Labs/Results:     Discharge Details        Discharge Medications      New Medications      Instructions Start Date   cefuroxime 500 MG tablet  Commonly known as: CEFTIN   250 mg, Oral, 2 Times Daily         Changes to Medications      Instructions Start Date   nitroglycerin 0.4 MG SL tablet  Commonly known as: NITROSTAT  What changed:   · how much to take  · how to take this  · when to take this  · reasons to take this   1 under the tongue as needed for angina, may repeat q5mins for up three doses         Continue These Medications      Instructions Start Date   aspirin 81 MG tablet   81 mg, Oral, Daily      atenolol 50 MG tablet  Commonly known as: TENORMIN   50 mg, Oral, Daily      Farxiga 5 MG tablet tablet  Generic drug: dapagliflozin   5 mg, Oral, Daily      fenofibrate 160 MG tablet   160 mg, Oral, Daily      finasteride 5 MG tablet  Commonly known as: PROSCAR   5 mg, Oral, Daily      isosorbide mononitrate 60 MG 24 hr  tablet  Commonly known as: IMDUR   60 mg, Oral, Daily      Januvia 100 MG tablet  Generic drug: SITagliptin   100 mg, Oral, Daily      levothyroxine 112 MCG tablet  Commonly known as: SYNTHROID, LEVOTHROID   112 mcg, Oral, Daily      losartan-hydrochlorothiazide 100-25 MG per tablet  Commonly known as: HYZAAR   0.5 tablets, Oral, Daily      O2  Commonly known as: OXYGEN   2 L/min, Inhalation, Nightly      rosuvastatin 40 MG tablet  Commonly known as: CRESTOR   40 mg, Oral, Nightly      thyroid 15 MG tablet  Commonly known as: ARMOUR   15 mg, Oral, Daily         Stop These Medications    ciprofloxacin 500 MG tablet  Commonly known as: CIPRO     warfarin 5 MG tablet  Commonly known as: COUMADIN            No Known Allergies      Discharge Disposition:  Home or Self Care    Diet:  Hospital:  Diet Order   Procedures   • Diet Regular; Cardiac, Consistent Carbohydrate       Activity:  - as tolerated with fall precautions    Restrictions or Other Recommendations:  - none       CODE STATUS:    Code Status and Medical Interventions:   Ordered at: 12/04/21 1512     Level Of Support Discussed With:    Patient     Code Status (Patient has no pulse and is not breathing):    CPR (Attempt to Resuscitate)     Medical Interventions (Patient has pulse or is breathing):    Full Support       Future Appointments   Date Time Provider Department Center   10/18/2022  9:15 AM Blanquita Ortega APRN [facility]   11/21/2022 10:15 AM Keegan Aguilera MD [facility]                 Mary Lucero DO  12/07/21      Time Spent on Discharge:  I spent  50  minutes on this discharge activity which included: face-to-face encounter with the patient, reviewing the data in the system, coordination of the care with the nursing staff as well as consultants, documentation, and entering orders.

## 2021-12-07 NOTE — PLAN OF CARE
Goal Outcome Evaluation:  Plan of Care Reviewed With: patient           Outcome Summary: PT evaluation completed.  Pt demonstrated independence with bed mobility, transfers, 14' + 400' feet of gait without AD, and ambulating up/down 12 stairs using R HR - no unsteadiness or gait abnormalities noted.  Pt reports he feels at baseline re: mobility and has no concerns at d/c.  Recommend home at d/c.  PT will sign off.

## 2021-12-07 NOTE — CASE MANAGEMENT/SOCIAL WORK
Continued Stay Note  [facility]     Patient Name: Cal Frausto  MRN: [id number]  Today's Date: 12/7/2021    Admit Date: 12/4/2021     Discharge Plan     Row Name 12/07/21 1152       Plan    Plan Home    Patient/Family in Agreement with Plan yes    Plan Comments I have met with Mr. Frausto at his bedside today to discuss the discharge plan.  He states that he prefers to return home.  I have offered to submit a referral for home health services.  He refuses these services and states that his daughter will provide his transportation home.  He denies having any discharge needs at this time.    Final Discharge Disposition Code 01 - home or self-care               Discharge Codes    No documentation.                     Estelita Myers RN

## 2021-12-07 NOTE — PROGRESS NOTES
Discussed pathology and PillCam results with patient.    PillCam is normal.    Colon polyps were tubular adenomas.    >>  Recommend surveillance colonoscopy in 3 years.    Will sign off.  Please call with questions or concerns.

## 2021-12-07 NOTE — THERAPY DISCHARGE NOTE
Patient Name: Cal Frausto  : 1945    MRN: [id number]                              Today's Date: 2021       Admit Date: 2021    Visit Dx:     ICD-10-CM ICD-9-CM   1. Anemia, unspecified type  D64.9 285.9   2. Supratherapeutic INR  R79.1 790.92   3. Gastrointestinal hemorrhage with melena  K92.1 578.1   4. Hyperglycemia  R73.9 790.29   5. Renal insufficiency  N28.9 593.9   6. Anticoagulated on Coumadin. Held 2 days prior to surgery  Z79.01 V58.61     Patient Active Problem List   Diagnosis   • AAA (abdominal aortic aneurysm) ([facility])   • CAD involving coronary bypass graft of native heart. CABG , stent 2019   • Essential hypertension   • Tobacco abuse   • Dyslipidemia   • Type 2 diabetes mellitus. HbA1c 11.2 (CMS/[facility])   • COPD in active smoker. On home oxygen (CMS/[facility])   • Arthritis   • Hypothyroidism   • History DVT left leg  on chronic anticoagulation with Coumadin (CMS/[facility])   • Coronary artery disease involving coronary bypass graft of native heart without angina pectoris   • Abdominal aortic aneurysm without rupture (6.4 cm) (CMS/[facility])   • S/P AAA endograft repair 2020   • Anticoagulated on Coumadin. Held 2 days prior to surgery   • Hx of renal cell cancer. 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   • History of seizures as a child    • Hypertension    • Hypothyroidism    • Pneumonia    • Seizures (HCC)     History of childhood seizures.   • Tobacco abuse     Ongoing   • Type 2 diabetes mellitus (HCC)      Past Surgical History:    Procedure Laterality Date   • ABDOMINAL AORTIC ANEURYSM REPAIR WITH ENDOGRAFT N/A 9/17/2020    Procedure: ABDOMINAL AORTIC ANEURYSM REPAIR WITH ENDOGRAFT;  Surgeon: Flynn Edwards MD;  Location: [facility] HYBRID OR 15;  Service: Cardiothoracic;  Laterality: N/A;  FLUORO - 13 MINS 12 SECS  DOSE - 716mGy  CONTRAST - 70 ml isovue 300   • CARDIAC CATHETERIZATION  11/2004    revealed an occluded saphenous vein graft to the RCA. There was an 80% left main with an occluded LAD with a patent LIMA graft to the LAD. There was also an occluded OM1 with a patent saphenous vein graft to the OM.   • CARDIAC CATHETERIZATION  07/30/2013    with 100% RCA. Occluded SVG. With 100% LAD. Patent LINDSEY with distal LAD occlusion. Widely patent SVG to ramus and OM-1, filling other natives of the collaterals with normal LVEF.   • CARDIAC CATHETERIZATION N/A 5/4/2018    Procedure: Left Heart Cath;  Surgeon: Keegan Aguilera MD;  Location: [facility] CATH INVASIVE LOCATION;  Service: Cardiovascular   • CARPAL TUNNEL RELEASE     • COLONOSCOPY  2018   • CORONARY ANGIOPLASTY WITH STENT PLACEMENT     • CORONARY ARTERY BYPASS GRAFT  2004    x3   • CYST REMOVAL Left     wrist x2   • ENDOSCOPY N/A 12/5/2021    Procedure: ESOPHAGOGASTRODUODENOSCOPY;  Surgeon: Shane Kelly MD;  Location: [facility] ENDOSCOPY;  Service: Gastroenterology;  Laterality: N/A;   • KIDNEY SURGERY  2018   • POLYPECTOMY        General Information     Row Name 12/07/21 0930          Physical Therapy Time and Intention    Document Type discharge evaluation/summary  -LM     Mode of Treatment individual therapy; physical therapy  -LM     Row Name 12/07/21 0930          General Information    Patient Profile Reviewed yes  -LM     Prior Level of Function independent:; all household mobility; gait; ADL's  -LM     Existing Precautions/Restrictions no known precautions/restrictions  -LM     Barriers to Rehab none identified  -LM     Row Name 12/07/21 0930          Living Environment    Lives  With alone  -LM     Row Name 12/07/21 0930          Home Main Entrance    Number of Stairs, Main Entrance six  -LM     Stair Railings, Main Entrance railing on right side (ascending)  -LM     Row Name 12/07/21 0930          Stairs Within Home, Primary    Stairs, Within Home, Primary split level - 7 steps up, 7 steps down  -LM     Number of Stairs, Within Home, Primary other (see comments)  14  -LM     Stair Railings, Within Home, Primary railing on left side (ascending)  -LM     Row Name 12/07/21 0930          Cognition    Orientation Status (Cognition) oriented x 4  -LM     Row Name 12/07/21 0930          Safety Issues, Functional Mobility    Impairments Affecting Function (Mobility) pain  -LM           User Key  (r) = Recorded By, (t) = Taken By, (c) = Cosigned By    Initials Name Provider Type    LM Emilee Jefferson, PT Physical Therapist               Mobility     Row Name 12/07/21 0930          Bed Mobility    Bed Mobility supine-sit  -LM     Supine-Sit [place] (Bed Mobility) modified independence  -LM     Assistive Device (Bed Mobility) bed rails; head of bed elevated  -LM     Comment (Bed Mobility) No issues noted with bed mobility.  -LM     Row Name 12/07/21 0930          Sit-Stand Transfer    Sit-Stand [place] (Transfers) independent  -LM     Assistive Device (Sit-Stand Transfers) --  No AD  -LM     Row Name 12/07/21 0930          Gait/Stairs (Locomotion)    [place] Level (Gait) independent  -LM     Assistive Device (Gait) --  No AD  -LM     Distance in Feet (Gait) 14 + 400  -LM     [place] Level (Stairs) modified independence  -LM     Handrail Location (Stairs) right side (ascending)  -LM     Number of Steps (Stairs) 12  -LM     Ascending Technique (Stairs) step-over-step  -LM     Descending Technique (Stairs) step-over-step  -LM     Comment (Gait/Stairs) Pt first ambulated to restroom and completed toileting independently.  Pt then ambulated in hallway, and up/down 12 stairs without issue  - no unsteadiness or gait abnormalities noted.  Pt reports he feels he is at baseline re: strength/mobility and has no concerns at d/c.  -LM           User Key  (r) = Recorded By, (t) = Taken By, (c) = Cosigned By    Initials Name Provider Type    Emilee Pantoja PT Physical Therapist               Obj/Interventions     Row Name 12/07/21 0930          Range of Motion Comprehensive    General Range of Motion bilateral lower extremity ROM WFL  -LM     Row Name 12/07/21 0930          Strength Comprehensive (MMT)    Comment, General Manual Muscle Testing (MMT) Assessment BLEs grossly 4+/5 throughout  -LM     Row Name 12/07/21 0930          Balance    Balance Assessment sitting static balance; standing static balance; standing dynamic balance  -LM     Static Sitting Balance WFL; unsupported  -LM     Static Standing Balance WFL; unsupported  -LM     Dynamic Standing Balance WFL; unsupported  -LM     Row Name 12/07/21 0930          Sensory Assessment (Somatosensory)    Sensory Assessment (Somatosensory) LE sensation intact  -LM           User Key  (r) = Recorded By, (t) = Taken By, (c) = Cosigned By    Initials Name Provider Type    Emilee Pantoja PT Physical Therapist               Goals/Plan    No documentation.                Clinical Impression     Row Name 12/07/21 0930          Pain    Additional Documentation Pain Scale: Numbers Pre/Post-Treatment (Group)  -LM     [facility] Name 12/07/21 0930          Pain Scale: Numbers Pre/Post-Treatment    Pretreatment Pain Rating 7/10  -LM     Posttreatment Pain Rating 7/10  -LM     Pain Location - Side Left  -LM     Pain Location - Orientation upper  -LM     Pain Location extremity  -LM     Pain Intervention(s) Repositioned; Elevated  -LM     Row Name 12/07/21 0930          Plan of Care Review    Plan of Care Reviewed With patient  -LM     Outcome Summary PT evaluation completed.  Pt demonstrated independence with bed mobility, transfers, 14' + 400' feet of gait without AD, and  ambulating up/down 12 stairs using R HR - no unsteadiness or gait abnormalities noted.  Pt reports he feels at baseline re: mobility and has no concerns at d/c.  Recommend home at d/c.  PT will sign off.  -LM     Row Name 12/07/21 0930          Therapy Assessment/Plan (PT)    Criteria for Skilled Interventions Met (PT) no; no problems identified which require skilled intervention  -LM     Row Name 12/07/21 0930          Vital Signs    Pretreatment Heart Rate (beats/min) 68  -LM     Posttreatment Heart Rate (beats/min) 66  -LM     Intra SpO2 (%) 91  -LM     O2 Delivery Intra Treatment room air  -LM     Post SpO2 (%) 96  -LM     O2 Delivery Post Treatment room air  -LM     Pre Patient Position Supine  -LM     Intra Patient Position Sitting  -LM     Post Patient Position Sitting  -LM     Row Name 12/07/21 0930          Positioning and Restraints    Pre-Treatment Position in bed  -LM     Post Treatment Position chair  -LM     In Chair reclined; call light within reach; encouraged to call for assist; waffle cushion; notified nsg  -LM           User Key  (r) = Recorded By, (t) = Taken By, (c) = Cosigned By    Initials Name Provider Type    LM Emilee Jefferson, PT Physical Therapist               Outcome Measures     Row Name 12/07/21 0930          How much help from another person do you currently need...    Turning from your back to your side while in flat bed without using bedrails? 4  -LM     Moving from lying on back to sitting on the side of a flat bed without bedrails? 4  -LM     Moving to and from a bed to a chair (including a wheelchair)? 4  -LM     Standing up from a chair using your arms (e.g., wheelchair, bedside chair)? 4  -LM     Climbing 3-5 steps with a railing? 4  -LM     To walk in hospital room? 4  -LM     AM-PAC 6 Clicks Score (PT) 24  -LM     Row Name 12/07/21 0930          Functional Assessment    Outcome Measure Options AM-PAC 6 Clicks Basic Mobility (PT)  -LM           User Key  (r) = Recorded By, (t)  = Taken By, (c) = Cosigned By    Initials Name Provider Type    LM Emilee Jefferson PT Physical Therapist              Physical Therapy Education                 Title: PT OT SLP Therapies (In Progress)     Topic: Physical Therapy (Done)     Point: Mobility training (Done)     Learning Progress Summary           Patient Acceptance, E, VU,DU by  at 12/7/2021 1006                   Point: Precautions (Done)     Learning Progress Summary           Patient Acceptance, E, VU,DU by  at 12/7/2021 1006                               User Key     Initials Effective Dates Name Provider Type Discipline     06/16/21 -  Emilee Jefferson, PT Physical Therapist PT              PT Recommendation and Plan     Plan of Care Reviewed With: patient  Outcome Summary: PT evaluation completed.  Pt demonstrated independence with bed mobility, transfers, 14' + 400' feet of gait without AD, and ambulating up/down 12 stairs using R HR - no unsteadiness or gait abnormalities noted.  Pt reports he feels at baseline re: mobility and has no concerns at d/c.  Recommend home at d/c.  PT will sign off.     Time Calculation:    PT Charges     Row Name 12/07/21 0930             Time Calculation    Start Time 0930  -LM      PT Received On 12/07/21  -LM              Untimed Charges    PT Eval/Re-eval Minutes 46  -LM              Total Minutes    Untimed Charges Total Minutes 46  -LM       Total Minutes 46  -LM            User Key  (r) = Recorded By, (t) = Taken By, (c) = Cosigned By    Initials Name Provider Type     Emilee Jefferson, MIRANDA Physical Therapist              Therapy Charges for Today     Code Description Service Date Service Provider Modifiers Qty    11597553982 HC PT EVAL LOW COMPLEXITY 4 12/7/2021 Emilee Jefferson, PT GP 1          PT G-Codes  Outcome Measure Options: AM-PAC 6 Clicks Basic Mobility (PT)  AM-PAC 6 Clicks Score (PT): 24  AM-PAC 6 Clicks Score (OT): 20    PT Discharge Summary  Anticipated Discharge Disposition (PT): home    Emilee  Luis, PT  12/7/2021

## 2021-12-08 NOTE — OUTREACH NOTE
Prep Survey      Responses   [facility] facility patient discharged from? [place]   Is LACE score < 7 ? No   Emergency Room discharge w/ pulse ox? No   Eligibility Readm Mgmt   Discharge diagnosis Anemia, Supratherapeutic INR   Does the patient have one of the following disease processes/diagnoses(primary or secondary)? Other   Does the patient have Home health ordered? No   Is there a DME ordered? No   Prep survey completed? Yes          Crista Christina RN

## 2021-12-10 ENCOUNTER — READMISSION MANAGEMENT (OUTPATIENT)
Dept: CALL CENTER | Facility: HOSPITAL | Age: 76
End: 2021-12-10

## 2021-12-10 NOTE — OUTREACH NOTE
Medical Week 1 Survey      Responses   [facility] patient discharged from? [place]   Does the patient have one of the following disease processes/diagnoses(primary or secondary)? Other   Week 1 attempt successful? Yes   Call start time 1111   Call end time 1113   Is patient permission given to speak with other caregiver? No   List who call center can speak with He says no   Meds reviewed with patient/caregiver? Yes   Is the patient having any side effects they believe may be caused by any medication additions or changes? No   Does the patient have all medications ordered at discharge? Yes   Is the patient taking all medications as directed (includes completed medication regime)? Yes   Does the patient have a primary care provider?  Yes   Does the patient have an appointment with their PCP within 7 days of discharge? Yes   Comments regarding PCP PCP has been seen 12/09   Has the patient kept scheduled appointments due by today? Yes   Psychosocial issues? No   Comments Oxygen at night only   Did the patient receive a copy of their discharge instructions? Yes   Nursing interventions Reviewed instructions with patient,  Educated on MyChart   What is the patient's perception of their health status since discharge? Improving   Is the patient/caregiver able to teach back signs and symptoms related to disease process for when to call PCP? Yes   Is the patient/caregiver able to teach back signs and symptoms related to disease process for when to call 911? Yes   Is the patient/caregiver able to teach back the hierarchy of who to call/visit for symptoms/problems? PCP, Specialist, Home health nurse, Urgent Care, ED, 911 Yes   Week 1 call completed? Yes   Wrap up additional comments Pt. states he really appreciates the great care he got and was treated with such respect and dignity          Tish Diaz RN

## 2022-10-12 DIAGNOSIS — I71.40 ABDOMINAL AORTIC ANEURYSM (AAA) WITHOUT RUPTURE, UNSPECIFIED PART: Primary | ICD-10-CM

## 2022-11-03 ENCOUNTER — HOSPITAL ENCOUNTER (OUTPATIENT)
Dept: CT IMAGING | Facility: HOSPITAL | Age: 77
Discharge: HOME | End: 2022-11-03

## 2022-11-03 DIAGNOSIS — I71.40 ABDOMINAL AORTIC ANEURYSM (AAA) WITHOUT RUPTURE, UNSPECIFIED PART: ICD-10-CM

## 2022-11-03 LAB — CREAT BLDA-MCNC: 2.2 MG/DL (ref 0.6–1.3)

## 2022-11-03 PROCEDURE — 82565 ASSAY OF CREATININE: CPT

## 2022-11-03 PROCEDURE — 74176 CT ABD & PELVIS W/O CONTRAST: CPT

## 2022-11-15 NOTE — PROGRESS NOTES
[facility]  PROGRESS NOTE    Patient Name: Cal Frausto  : 1945  MRN: [id number]    Date of Admission: 2021  Primary Care Physician: Harsha Smith MD    Subjective   Subjective     CC:  F/u anemia, bruising, supratherapeutic INR    HPI:  Patient resting in bed. Did not complete the entire colonoscopy prep last night. Wants to go home.    ROS:  Gen- No fevers, chills  CV- No chest pain, palpitations  Resp- No cough, dyspnea  GI- No N/V/D, abd pain     Objective   Objective     Vital Signs:   Temp:  [98.3 °F (36.8 °C)-98.7 °F (37.1 °C)] 98.7 °F (37.1 °C)  Heart Rate:  [48-80] 61  Resp:  [16-20] 18  BP: ()/(50-67) 108/65  Flow (L/min):  [2-6] 2  FiO2 (%):  [31 %-86 %] 86 %     Physical Exam:  Constitutional: No acute distress, awake, alert  HENT: NCAT, mucous membranes moist  Respiratory: Clear to auscultation bilaterally, respiratory effort normal   Cardiovascular: RRR, no murmurs, rubs, or gallops  Gastrointestinal: Positive bowel sounds, soft, nontender, nondistended  Musculoskeletal: No bilateral ankle edema, large left arm hematoma extending from left wrist to left upper arm  Psychiatric: Appropriate affect, cooperative  Neurologic: Oriented x 3, strength symmetric in all extremities, Cranial Nerves grossly intact to confrontation, speech clear  Skin: No rashes    Exam unchanged from     Results Reviewed:  LAB RESULTS:      Lab 21  0859 21  2337 21  1642 21  0708 21  0023 21  1658 21  1658 21  1349 21  1219 21  1219   WBC 5.23  --   --  6.40  --   --   --   --   --  8.17   HEMOGLOBIN 9.0* 8.4* 8.4* 7.6*  7.6* 6.5*   < > 5.5*  --    < > 5.7*   HEMATOCRIT 27.7* 25.4* 26.7* 22.9*  22.9* 19.9*   < > 17.7*  --    < > 17.8*   PLATELETS 220  --   --  233  --   --   --   --   --  314   NEUTROS ABS 3.21  --   --  4.12  --   --   --   --   --  6.11   IMMATURE GRANS (ABS) 0.11*  --   --   0.17*  --   --   --   --   --  0.22*   LYMPHS ABS 1.24  --   --  1.42  --   --   --   --   --  1.19   MONOS ABS 0.49  --   --  0.52  --   --   --   --   --  0.59   EOS ABS 0.15  --   --  0.14  --   --   --   --   --  0.04   MCV 88.5  --   --  87.7  --   --   --   --   --  86.0   PROTIME 14.5*  --   --  15.1* 15.0*  --  15.9* >120.0*   < > >120.0*    < > = values in this interval not displayed.         Lab 12/06/21  0859 12/05/21  0708 12/04/21  1349   SODIUM 137 131* 130*  129*   POTASSIUM 4.1 4.5 4.2  4.2   CHLORIDE 106 101 95*  94*   CO2 22.0 24.0 22.0  23.0   ANION GAP 9.0 6.0 13.0  12.0   BUN 15 20 23  23   CREATININE 1.26 1.51* 1.57*  1.56*   GLUCOSE 126* 134* 288*  280*   CALCIUM 7.8* 7.5* 8.4*  8.1*         Lab 12/05/21  0708 12/04/21  1349   TOTAL PROTEIN 4.5* 6.2   ALBUMIN 2.70* 3.30*   GLOBULIN 1.8 2.9   ALT (SGPT) 9 15   AST (SGOT) 15 20   BILIRUBIN 0.9 0.5   ALK PHOS 36* 44         Lab 12/06/21  0859 12/05/21  0708 12/05/21  0023 12/04/21  1658 12/04/21  1349   PROTIME 14.5* 15.1* 15.0* 15.9* >120.0*   INR 1.16 1.23* 1.22* 1.31* >10.00*             Lab 12/05/21  0023 12/04/21  1349   IRON  --  96   IRON SATURATION  --  28   TIBC  --  347   TRANSFERRIN  --  233   VITAMIN B 12 323  --    ABO TYPING  --  B   RH TYPING  --  Positive   ANTIBODY SCREEN  --  Negative         Brief Urine Lab Results  (Last result in the past 365 days)      Color   Clarity   Blood   Leuk Est   Nitrite   Protein   CREAT   Urine HCG        12/04/21 1515 Orange   Cloudy   Large (3+)   Negative   Negative   30 mg/dL (1+)                 Microbiology Results Abnormal     Procedure Component Value - Date/Time    COVID PRE-OP / PRE-PROCEDURE SCREENING ORDER (NO ISOLATION) - Swab, Nasopharynx [[id number]] Collected: 12/04/21 1645    Lab Status: Final result Specimen: Swab from Nasopharynx Updated: 12/05/21 1803    Narrative:      The following orders were created for panel order COVID PRE-OP / PRE-PROCEDURE SCREENING ORDER (NO  ISOLATION) - Swab, Nasopharynx.  Procedure                               Abnormality         Status                     ---------                               -----------         ------                     COVID-19 PCR, [facility]...[[id number]]                      Final result                 Please view results for these tests on the individual orders.    COVID-19 PCR, [facility], NP SWAB IN LEXAR VIRAL TRANSPORT MEDIA/ORAL SWISH 24-30 HR TAT - Swab, Nasopharynx [[id number]] Collected: 12/04/21 1645    Lab Status: Final result Specimen: Swab from Nasopharynx Updated: 12/05/21 1806     SARS-CoV-2 TORIE Not Detected    COVID PRE-OP / PRE-PROCEDURE SCREENING ORDER (NO ISOLATION) - Swab, Nasopharynx [[id number]]  (Normal) Collected: 12/05/21 1112    Lab Status: Final result Specimen: Swab from Nasopharynx Updated: 12/05/21 1305    Narrative:      The following orders were created for panel order COVID PRE-OP / PRE-PROCEDURE SCREENING ORDER (NO ISOLATION) - Swab, Nasopharynx.  Procedure                               Abnormality         Status                     ---------                               -----------         ------                     COVID-19, [facility]BBOTT IN-HOUS...[[id number]]  Normal              Final result                 Please view results for these tests on the individual orders.    COVID-19, ABBOTT IN-HOUSE,NASAL Swab (NO TRANSPORT MEDIA) 2 HR TAT - Swab, Nasopharynx [[id number]]  (Normal) Collected: 12/05/21 1112    Lab Status: Final result Specimen: Swab from Nasopharynx Updated: 12/05/21 1305     COVID19 Presumptive Negative    Narrative:      Fact sheet for providers: https://www.fda.gov/media/977196/download     Fact sheet for patients: https://www.fda.gov/media/530868/download    Test performed by PCR.  If inconsistent with clinical signs and symptoms patient should be tested with different authorized molecular test.          No radiology results from the last 24 hrs        I have reviewed the  medications:  Scheduled Meds:atenolol, 50 mg, Oral, Daily  cefTRIAXone, 1 g, Intravenous, Q24H  finasteride, 5 mg, Oral, Daily  isosorbide mononitrate, 60 mg, Oral, Daily  levothyroxine, 112 mcg, Oral, Q AM  O2, 2 L/min, Inhalation, Nightly  pantoprazole, 40 mg, Intravenous, Q12H  rosuvastatin, 40 mg, Oral, Nightly  sodium chloride, 10 mL, Intravenous, Q12H  thyroid, 15 mg, Oral, Daily      Continuous Infusions:lactated ringers, 30 mL/hr      PRN Meds:.•  acetaminophen  •  HYDROmorphone  •  lactated ringers  •  sodium chloride    Assessment/Plan   Assessment & Plan     Active Hospital Problems    Diagnosis  POA   • **Anemia [D64.9]  Yes   • Hx of renal cell cancer. Left nephrectomy 10/2019 [Z85.528]  Not Applicable   • Coronary artery disease involving coronary bypass graft of native heart without angina pectoris [I25.810]  Yes   • History DVT left leg 2016 on chronic anticoagulation with Coumadin (CMS/[facility]) [I82.409]  Yes   • COPD in active smoker. On home oxygen (CMS/[facility]) [J44.9]  Yes   • Dyslipidemia [E78.5]  Yes   • Type 2 diabetes mellitus. HbA1c 11.2 (CMS/[facility]) [E11.9]  Yes   • CAD involving coronary bypass graft of native heart. CABG 2004, stent October 2019 [I25.810]  Yes   • Essential hypertension [I10]  Yes      Resolved Hospital Problems   No resolved problems to display.        Brief Hospital Course to date:  Cal Frausto is a 76 y.o. male with past medical history of coronary artery disease status post CABG x3 in 2004, history of left lower extremity DVT in 2016 on anticoagulation with Coumadin, essential hypertension, dyslipidemia, AAA status post endovascular repair in 2020, type 2 diabetes, ongoing tobacco use, renal cell carcinoma status post left nephrectomy 2019, COPD on nocturnal oxygen, arthritis, hypothyroidism who presented to the hospital today brought by his daughter for concern of left arm swelling, blue discoloration and tenderness for 1 week. He was found to be severely anemic with  hemoglobin 5.7 and with INR more than 10    Acute blood loss anemia  Severe symptomatic anemia  Supratherapeutic INR  - s/p Kcentra and IV vitamin K with improvement in INR (>10 on admission)  - holding coumadin  - s/p 4 units PRBCs  - IV PPI BID  - serial H&H, transfuse Hgb <7  - s/p EGD on 12/5 without clear source of bleeding, C-scope planned today, if negative will need pill cam     Hematuria - resolved  UTI  - The patient mentioned that he had cystoscopy done 3 months ago with his urologist after part of his routine follow-up after his nephrectomy for left renal cancer and according to him it was negative study.  - UA with 2+ bacteria, continue rocephin add on urine culture   - likely secondary to supratherapeutic INR     Left arm and forearm ecchymosis and tenderness  - Cool compresses  - PRN pain control      CAD s/p CABG    History of left lower extremity DVT in 2016  - coumadin on hold for now    Essential hypertension   Dyslipidemia  COPD, on nocturnal oxygen  Hypothyroidism  - All those medical conditions are chronic and stable, home meds resumed as appropriate    DVT prophylaxis:  Mechanical DVT prophylaxis orders are present.       AM-PAC 6 Clicks Score (PT): 15 (12/05/21 2000)    Disposition: I expect the patient to be discharged TBD    CODE STATUS:   Code Status and Medical Interventions:   Ordered at: 12/04/21 1512     Level Of Support Discussed With:    Patient     Code Status (Patient has no pulse and is not breathing):    CPR (Attempt to Resuscitate)     Medical Interventions (Patient has pulse or is breathing):    Full Support       Mary Lucero, DO  12/06/21               Male

## 2022-11-21 ENCOUNTER — OFFICE VISIT (OUTPATIENT)
Dept: CARDIOLOGY | Facility: CLINIC | Age: 77
End: 2022-11-21

## 2022-11-21 VITALS
HEART RATE: 75 BPM | BODY MASS INDEX: 29.44 KG/M2 | HEIGHT: 66 IN | DIASTOLIC BLOOD PRESSURE: 90 MMHG | SYSTOLIC BLOOD PRESSURE: 154 MMHG | WEIGHT: 183.2 LBS | OXYGEN SATURATION: 95 %

## 2022-11-21 DIAGNOSIS — E78.5 DYSLIPIDEMIA: ICD-10-CM

## 2022-11-21 DIAGNOSIS — I10 ESSENTIAL HYPERTENSION: ICD-10-CM

## 2022-11-21 DIAGNOSIS — I25.810 CORONARY ARTERY DISEASE INVOLVING CORONARY BYPASS GRAFT OF NATIVE HEART WITHOUT ANGINA PECTORIS: Primary | ICD-10-CM

## 2022-11-21 PROCEDURE — 99213 OFFICE O/P EST LOW 20 MIN: CPT | Performed by: INTERNAL MEDICINE

## 2022-11-21 RX ORDER — WARFARIN SODIUM 5 MG/1
1 TABLET ORAL DAILY
COMMUNITY
Start: 2022-11-14

## 2022-11-21 RX ORDER — PIOGLITAZONEHYDROCHLORIDE 15 MG/1
1 TABLET ORAL DAILY
COMMUNITY
Start: 2022-09-14

## 2022-11-21 NOTE — PROGRESS NOTES
[facility]  Subjective:     Encounter Date: 11/21/2022      Patient ID: Cal Frausto is a 77 y.o. male.    Chief Complaint: Coronary Artery Disease and Hypertension      PROBLEM LIST:  1. Coronary artery disease  a. [facility] for NSTEMI, April 1989: Occluded LAD. RCA and LCx within normal limits.   b. [facility], 03/2004, for abnormal Carmultivessel disease.  c. CABG x3, 04/19/2004, Dr. Cardona: LIMA to LAD, SVG to the ramus and OM1, SVG to PDA.   d. [facility], 11/2004: Occluded SVG to the RCA. 80% LM with an occluded LAD with a patent LIMA graft to the LAD. Occluded OM1 with a patent SVG to the OM.    e. [facility], 07/30/2013: 100% RCA. Occluded SVG. Patent LIMA with distal LAD occlusion. Widely patent SVG to ramus and OM-1, filling other natives of the collaterals with normal LVEF.  f. [facility], 05/04/2018: 90% proximal LCx supplying the large left posterior lateral and collaterals to the RCA/right PDA. Successful treatment with 3.0 x 12 Xience EES. Widely patent LIMA however distal LAD occluded. Patent SVG to the ramus branch. 30% disease distal. EF 50%.  2. DVT:  a. Idiopathic Duplex venous lower extremity, 09/19/2016: Acute LLE DVT noted in the posterial tibial. Acute LLE superficial thrombophlebitis noted in the greater saphenous (above knee) and varicosity (below knee). Chronic LLE superficial thrombophlebitis noted in the greater saphenous (below knee).  3. Hypertension.  4. Dyslipidemia.  5. AAA  a. CTA Abdomen, 10/16/2017: 5.0 cm infrarenal AAA, followed by Dr. Maame obrien 09/17/2020 endovascular repair of abdominal aortic aneurysm, Dr. Grace decker CT A/P 09/30/2021: AAA measures 5.5 x 4.8 cm, previously was 6.2 x 5.6 cm.   6. Type 2 diabetes.   7. Ongoing tobacco abuse.  8. Renal cancer 2019  a. Status post left nephrectomy  9. History of alcohol abuse, none since 1977.  10. COPD with nocturnal oxygen use.  11. Arthritis.  12. Hypothyroidism.  13. History of childhood seizures.  14. Left wrist  cyst removal x2.  15. Left carpal tunnel surgery.  16. Bell’s palsy in 2012.    History of Present Illness  Cal A High returns today for a 1 year follow up with a history of CAD and cardiac risk factors. Since last visit, patient has been doing well overall from a cardiovascular standpoint. He remains busy and active by doing yard work. He recently initiated Farxiga per his PCP to manage his glucose levels. He will be following up with Dr. Edwards this month for his AAA S/P repair. His blood pressure is high in visit because he states that he has not taken his blood pressure medication yet today. Patient denies chest pain, shortness of breath, orthopnea, palpitations, edema, dizziness, and syncope.     No Known Allergies      Current Outpatient Medications:   •  aspirin 81 MG tablet, Take 81 mg by mouth daily., Disp: , Rfl:   •  atenolol (TENORMIN) 50 MG tablet, Take 50 mg by mouth daily., Disp: , Rfl:   •  dapagliflozin (Farxiga) 5 MG tablet tablet, Take 5 mg by mouth Daily., Disp: , Rfl:   •  fenofibrate 160 MG tablet, Take 160 mg by mouth daily., Disp: , Rfl:   •  isosorbide mononitrate (IMDUR) 60 MG 24 hr tablet, Take 60 mg by mouth daily., Disp: , Rfl:   •  levothyroxine (SYNTHROID, LEVOTHROID) 112 MCG tablet, Take 112 mcg by mouth daily., Disp: , Rfl:   •  losartan-hydrochlorothiazide (HYZAAR) 100-25 MG per tablet, Take 0.5 tablets by mouth Daily., Disp: , Rfl:   •  nitroglycerin (NITROSTAT) 0.4 MG SL tablet, 1 under the tongue as needed for angina, may repeat q5mins for up three doses (Patient taking differently: Place 0.4 mg under the tongue Every 5 (Five) Minutes As Needed. 1 under the tongue as needed for angina, may repeat q5mins for up three doses), Disp: 25 tablet, Rfl: 3  •  pioglitazone (ACTOS) 15 MG tablet, Take 1 tablet by mouth Daily., Disp: , Rfl:   •  rosuvastatin (CRESTOR) 40 MG tablet, Take 1 tablet by mouth Every Night., Disp: 90 tablet, Rfl: 3  •  SITagliptin (JANUVIA) 100 MG tablet, Take  "100 mg by mouth Daily., Disp: , Rfl:   •  thyroid (ARMOUR) 15 MG tablet, Take 15 mg by mouth daily., Disp: , Rfl:   •  warfarin (COUMADIN) 5 MG tablet, Take 1 tablet by mouth Daily., Disp: , Rfl:     The following portions of the patient's history were reviewed and updated as appropriate: allergies, current medications, past family history, past medical history, past social history, past surgical history and problem list.    Review of Systems   Constitutional: Negative.   Cardiovascular: Negative for chest pain, dyspnea on exertion, leg swelling, palpitations and syncope.   Respiratory: Negative.  Negative for shortness of breath.    Hematologic/Lymphatic: Negative for bleeding problem. Does not bruise/bleed easily.   Skin: Negative for rash.   Musculoskeletal: Negative for muscle weakness and myalgias.   Gastrointestinal: Negative for heartburn, nausea and vomiting.   Neurological: Negative for dizziness, light-headedness, loss of balance and numbness.          Objective:     Vitals:    11/21/22 1013   BP: 154/90   BP Location: Left arm   Patient Position: Sitting   Pulse: 75   SpO2: 95%   Weight: 83.1 kg (183 lb 3.2 oz)   Height: 167.6 cm (66\")         Constitutional:       Appearance: Well-developed.   Neck:      Thyroid: No thyromegaly.      Vascular: No carotid bruit or JVD.   Pulmonary:      Breath sounds: Normal breath sounds.   Cardiovascular:      Regular rhythm.      No gallop. No S3 and S4 gallop.   Edema:     Peripheral edema absent.   Abdominal:      General: Bowel sounds are normal.      Palpations: Abdomen is soft. There is no abdominal mass.      Tenderness: There is no abdominal tenderness.   Skin:     General: Skin is warm and dry.      Findings: No rash.   Neurological:      Mental Status: Alert and oriented to person, place, and time.         Lab Review:    Lab date: 08/24/2022  • FLP: , , HDL 32, LDL 85  • CMP: Glu 193, BUN 51, Creat 2.09, eGFR 31, Na 140, K 5.1, Cl 103, CO2 23, Ca " 9.7, Alk Phos 47, AST 14, ALT 12  • CBC: WBC 7.1, RBC 6.11, HGB 16.8, HCT 50.6, MCV 83, MCH 27.5,       Procedures        Assessment:   Diagnoses and all orders for this visit:    1. Coronary artery disease involving coronary bypass graft of native heart without angina pectoris (Primary)    2. Essential hypertension    3. Dyslipidemia        Impression:  1. Coronary artery disease. Stable without angina on current activity. Continue on aspirin 81 mg daily and warfarin as directed for antiplatelet therapy. Continue on nitroglycerin 0.4 mg as needed and Imdur 60 mg daily for angina.  2. Essential hypertension. Well controlled. Continue on losartan-HCTZ 0.5 tablet daily for hypertension.   3. Dyslipidemia. Well controlled. LDL 88 on 07/29/2022. Continue on rosuvastatin 40 mg daily for hyperlipidemia.    Plan:  1. Stable cardiac status.   2. Continue current medications.  3. Revisit in 12 MO, or sooner as needed.    Scribed for Keegan Aguilera MD by Yola Tucker. 11/21/2022 11:03 EST    Keegan Aguilera MD      Please note that portions of this note may have been completed with a voice recognition program. Efforts were made to edit the dictations, but occasionally words are mistranscribed.

## 2022-11-29 ENCOUNTER — OFFICE VISIT (OUTPATIENT)
Dept: CARDIAC SURGERY | Facility: CLINIC | Age: 77
End: 2022-11-29

## 2022-11-29 VITALS
HEART RATE: 76 BPM | HEIGHT: 66 IN | OXYGEN SATURATION: 95 % | BODY MASS INDEX: 29.25 KG/M2 | WEIGHT: 182 LBS | DIASTOLIC BLOOD PRESSURE: 80 MMHG | SYSTOLIC BLOOD PRESSURE: 170 MMHG | TEMPERATURE: 97.5 F

## 2022-11-29 DIAGNOSIS — Z98.890 S/P AAA REPAIR: Primary | ICD-10-CM

## 2022-11-29 DIAGNOSIS — I71.40 ABDOMINAL AORTIC ANEURYSM (AAA) WITHOUT RUPTURE, UNSPECIFIED PART: ICD-10-CM

## 2022-11-29 DIAGNOSIS — Z86.79 S/P AAA REPAIR: Primary | ICD-10-CM

## 2022-11-29 PROCEDURE — 99214 OFFICE O/P EST MOD 30 MIN: CPT | Performed by: REGISTERED NURSE

## 2022-11-29 RX ORDER — GLIPIZIDE 10 MG/1
10 TABLET, FILM COATED, EXTENDED RELEASE ORAL DAILY
COMMUNITY

## 2022-11-29 NOTE — PROGRESS NOTES
[facility] Cardiothoracic Surgery Office Follow Up Note    Date of Encounter: 2022     Name: Cal Frausto  : 1945     Referred By: No ref. provider found  PCP: Harsha Smith MD    Chief Complaint:    Chief Complaint   Patient presents with   • Follow-up     1 YR FU with CTA Abd /Pelvis for AAA       Subjective      History of Present Illness:    It was nice to see Cal Frausto in follow up.  He is a pleasant 77 y.o. male with PMH significant for current smoker,HTN, HLD on statin therapy, DVT, renal cell cancer, s/p nephrectomy 10/2019, CAD s/p CABG/stenting, type II DM (last A1C 11.2), and AAA s/p endovascular repair 2020 with Dr. Edwards.     Mr. Frausto was last seen in office on 2021.  Patient was overall doing well at the time.  He did report intermittent chest pain and CARMEN that was chronic in nature with his history of CAD.  Patient presents to office today for annual follow-up and surveillance imaging.  He denies any new onset of abdominal or back pain.  Patient continues to experience intermittent chest pain with CARMEN but denies worsening.  Continues to smoke 0.5 to 1 ppd.  Patient follows closely with Dr. Aguilera.  Reports he has not taken his blood pressure medications this morning but he usually runs around 140 or less.      Review of Systems:  Review of Systems   Constitutional: Negative for chills, decreased appetite, diaphoresis, fever, malaise/fatigue, night sweats, weight gain and weight loss.   HENT: Negative for hoarse voice.    Eyes: Negative for blurred vision, double vision and visual disturbance.   Cardiovascular: Positive for chest pain (says occasionally, not as much as before he had his coronary stent 5 yrs ago) and dyspnea on exertion. Negative for claudication, irregular heartbeat, leg swelling, near-syncope, orthopnea, palpitations, paroxysmal nocturnal dyspnea and syncope.   Respiratory: Positive for cough, shortness of breath and wheezing.  Negative for hemoptysis and sputum production.    Hematologic/Lymphatic: Negative for adenopathy and bleeding problem. Bruises/bleeds easily.   Skin: Negative for color change, nail changes, poor wound healing and rash.   Musculoskeletal: Positive for joint pain. Negative for back pain, falls and muscle cramps.   Gastrointestinal: Negative for abdominal pain, dysphagia and heartburn.   Genitourinary: Negative for flank pain.   Neurological: Negative for brief paralysis, disturbances in coordination, dizziness, focal weakness, headaches, light-headedness, loss of balance, numbness, paresthesias, sensory change, vertigo and weakness.   Psychiatric/Behavioral: Negative for depression and suicidal ideas.   Allergic/Immunologic: Negative for persistent infections.       I have reviewed the following portions of the patient's history: allergies, current medications, past family history, past medical history, past social history, past surgical history and problem list and confirm it's accurate.    Allergies:  No Known Allergies    Medications:      Current Outpatient Medications:   •  aspirin 81 MG tablet, Take 81 mg by mouth daily., Disp: , Rfl:   •  atenolol (TENORMIN) 50 MG tablet, Take 50 mg by mouth daily., Disp: , Rfl:   •  dapagliflozin (Farxiga) 5 MG tablet tablet, Take 5 mg by mouth Daily., Disp: , Rfl:   •  fenofibrate 160 MG tablet, Take 160 mg by mouth daily., Disp: , Rfl:   •  glipizide (GLUCOTROL XL) 10 MG 24 hr tablet, Take 10 mg by mouth Daily., Disp: , Rfl:   •  isosorbide mononitrate (IMDUR) 60 MG 24 hr tablet, Take 60 mg by mouth daily., Disp: , Rfl:   •  levothyroxine (SYNTHROID, LEVOTHROID) 112 MCG tablet, Take 112 mcg by mouth daily., Disp: , Rfl:   •  losartan-hydrochlorothiazide (HYZAAR) 100-25 MG per tablet, Take 0.5 tablets by mouth Daily., Disp: , Rfl:   •  nitroglycerin (NITROSTAT) 0.4 MG SL tablet, 1 under the tongue as needed for angina, may repeat q5mins for up three doses (Patient taking  differently: Place 0.4 mg under the tongue Every 5 (Five) Minutes As Needed. 1 under the tongue as needed for angina, may repeat q5mins for up three doses), Disp: 25 tablet, Rfl: 3  •  pioglitazone (ACTOS) 15 MG tablet, Take 1 tablet by mouth Daily., Disp: , Rfl:   •  rosuvastatin (CRESTOR) 40 MG tablet, Take 1 tablet by mouth Every Night., Disp: 90 tablet, Rfl: 3  •  SITagliptin (JANUVIA) 100 MG tablet, Take 100 mg by mouth Daily., Disp: , Rfl:   •  thyroid (ARMOUR) 15 MG tablet, Take 15 mg by mouth daily., Disp: , Rfl:   •  warfarin (COUMADIN) 5 MG tablet, Take 1 tablet by mouth Daily. 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   • History of seizures as a child    • Hypertension    • Hypothyroidism    • Pneumonia    • Seizures (HCC)     History of childhood seizures.   • Tobacco abuse     Ongoing   • Type 2 diabetes mellitus (HCC)        Past Surgical History:   Procedure Laterality Date   • ABDOMINAL AORTIC ANEURYSM REPAIR WITH ENDOGRAFT N/A 9/17/2020    Procedure: ABDOMINAL AORTIC ANEURYSM REPAIR WITH ENDOGRAFT;  Surgeon: Flynn Edwards MD;  Location: [place] [address];  Service: Cardiothoracic;  Laterality: N/A;  FLUORO - 13 MINS 12 SECS  DOSE - 716mGy  CONTRAST - 70 ml isovue 300   • CARDIAC CATHETERIZATION  11/2004    revealed an occluded saphenous vein graft to the RCA. There was an 80% left main with an occluded LAD with a patent LIMA graft to the LAD. There was also an occluded OM1 with a patent saphenous vein graft to the OM.   • CARDIAC CATHETERIZATION   "07/30/2013    with 100% RCA. Occluded SVG. With 100% LAD. Patent LINDSEY with distal LAD occlusion. Widely patent SVG to ramus and OM-1, filling other natives of the collaterals with normal LVEF.   • CARDIAC CATHETERIZATION N/A 5/4/2018    Procedure: Left Heart Cath;  Surgeon: Keegan Aguilera MD;  Location:  [facility];  Service: Cardiovascular   • CARPAL TUNNEL RELEASE     • COLONOSCOPY  2018   • COLONOSCOPY N/A 12/6/2021    Procedure: COLONOSCOPY;  Surgeon: Brunner, Mark I, MD;  Location:  [facility];  Service: Gastroenterology;  Laterality: N/A;   • CORONARY ANGIOPLASTY WITH STENT PLACEMENT     • CORONARY ARTERY BYPASS GRAFT  2004    x3   • CYST REMOVAL Left     wrist x2   • ENDOSCOPY N/A 12/5/2021    Procedure: ESOPHAGOGASTRODUODENOSCOPY;  Surgeon: Shane Kelly MD;  Location:  [facility];  Service: Gastroenterology;  Laterality: N/A;   • KIDNEY SURGERY  2018   • POLYPECTOMY         Social History     Socioeconomic History   • Marital status:    • Number of children: 3   Tobacco Use   • Smoking status: Every Day     Packs/day: 0.50     Years: 54.00     Pack years: 27.00     Types: Cigarettes   • Smokeless tobacco: Never   • Tobacco comments:     smoked about 54 years.    Vaping Use   • Vaping Use: Never used   Substance and Sexual Activity   • Alcohol use: Not Currently     Comment: Alcohol abuse, quit 1977   • Drug use: Never   • Sexual activity: Defer        Family History   Problem Relation Age of Onset   • No Known Problems Sister        Objective     Physical Exam:  Vitals:    11/29/22 0903 11/29/22 0904   BP: 178/80 170/80   BP Location: Left arm Right arm   Patient Position: Sitting Sitting   Pulse: 76    Temp: 97.5 °F (36.4 °C)    SpO2: 95%    Weight: 82.6 kg (182 lb)    Height: 167.6 cm (66\")       Body mass index is 29.38 kg/m².    Physical Exam  Vitals reviewed.   Constitutional:       General: He is not in acute distress.     Appearance: He is well-groomed. He is not " ill-appearing.   Eyes:      Pupils: Pupils are equal, round, and reactive to light.   Cardiovascular:      Rate and Rhythm: Normal rate and regular rhythm.      Heart sounds: Normal heart sounds.   Pulmonary:      Effort: Pulmonary effort is normal.      Breath sounds: Wheezing present.   Abdominal:      Palpations: Abdomen is soft.   Musculoskeletal:      Right lower leg: No edema.      Left lower leg: No edema.   Skin:     General: Skin is warm and dry.      Capillary Refill: Capillary refill takes less than 2 seconds.   Neurological:      Mental Status: He is alert and oriented to person, place, and time.   Psychiatric:         Attention and Perception: Attention normal.         Mood and Affect: Mood normal.         Speech: Speech normal.         Behavior: Behavior normal. Behavior is cooperative.         Thought Content: Thought content normal.         Judgment: Judgment normal.         Imaging/Labs:  CT Abdomen Pelvis Without Contrast    Result Date: 11/3/2022  Stable abdominal aortic aneurysm size measuring up to 6 cm. Aortobiiliac stent graft is present. Fat-containing anterior abdominal wall hernias unchanged.    This report was finalized on 11/3/2022 3:57 PM by Freya Reyna MD.      I personally reviewed these films and report.       Assessment / Plan      Assessment / Plan:  Diagnoses and all orders for this visit:    1. S/P AAA endograft repair 9/17/2020 (Primary)    2. Abdominal aortic aneurysm (AAA) without rupture, unspecified part    · PMH significant for current smoker,HTN, HLD on statin therapy, DVT, renal cell cancer, s/p nephrectomy 10/2019, CAD s/p CABG/stenting, type II DM (last A1c 11.2), and AAA s/p endovascular repair 09/17/2020 with Dr. Edwards.   · Mr. Frausto was last seen in office on October 19, 2021.  Patient was overall doing well at the time.  He did report intermittent chest pain and CARMEN that was chronic in nature with his history of CAD.    · Patient presents to office today for annual  follow-up and surveillance imaging.  He denies any new onset of abdominal or back pain.  Patient continues to experience intermittent chest pain with CARMEN but denies worsening.  Continues to smoke 0.5 to 1 ppd.  Patient follows closely with Dr. Aguilera.    · Physical exam overall benign.  Patient is noted to be hypertensive this morning in the 170s. Reports he has not taken his blood pressure medications this morning but he usually runs around 140 or less.   · CT abdomen/pelvis with stable AAA.  Native sac measuring ~5.8 to 6 cm compared to a measurement of 6 cm last year.  Without endoleak or dissection.  · Continue aspirin, statin, warfarin, BB, and ARB regimen.   · We will plan to follow up in 1 year with CT abdomen/pelvis without contrast as patient has one kidney and creatinine is running >2.0.    Follow Up:   Return in about 1 year (around 11/29/2023) for CT Abdomen/Pelvis withouth contrast -- Follow up AAA.   Or sooner for any further concerns or worsening sign and symptoms.  Patient encouraged to call the office with any questions or concerns.     Thank you for allowing me to participate in your care.  Best Regards,    PEGGY Oliveira  [facility] Cardiothoracic Surgery  11/29/22  10:12 EST

## 2023-09-22 DIAGNOSIS — I71.40 ABDOMINAL AORTIC ANEURYSM (AAA) WITHOUT RUPTURE, UNSPECIFIED PART: Primary | ICD-10-CM

## 2023-11-27 ENCOUNTER — OFFICE VISIT (OUTPATIENT)
Dept: CARDIOLOGY | Facility: CLINIC | Age: 78
End: 2023-11-27
Payer: MEDICARE

## 2023-11-27 VITALS
HEIGHT: 63 IN | HEART RATE: 71 BPM | OXYGEN SATURATION: 95 % | DIASTOLIC BLOOD PRESSURE: 80 MMHG | WEIGHT: 183 LBS | SYSTOLIC BLOOD PRESSURE: 148 MMHG | BODY MASS INDEX: 32.43 KG/M2

## 2023-11-27 DIAGNOSIS — E78.5 DYSLIPIDEMIA: ICD-10-CM

## 2023-11-27 DIAGNOSIS — I10 ESSENTIAL HYPERTENSION: ICD-10-CM

## 2023-11-27 DIAGNOSIS — I25.810 CORONARY ARTERY DISEASE INVOLVING CORONARY BYPASS GRAFT OF NATIVE HEART WITHOUT ANGINA PECTORIS: Primary | ICD-10-CM

## 2023-11-27 PROCEDURE — 3079F DIAST BP 80-89 MM HG: CPT | Performed by: NURSE PRACTITIONER

## 2023-11-27 PROCEDURE — 93000 ELECTROCARDIOGRAM COMPLETE: CPT | Performed by: NURSE PRACTITIONER

## 2023-11-27 PROCEDURE — 1160F RVW MEDS BY RX/DR IN RCRD: CPT | Performed by: NURSE PRACTITIONER

## 2023-11-27 PROCEDURE — 1159F MED LIST DOCD IN RCRD: CPT | Performed by: NURSE PRACTITIONER

## 2023-11-27 PROCEDURE — 99214 OFFICE O/P EST MOD 30 MIN: CPT | Performed by: NURSE PRACTITIONER

## 2023-11-27 PROCEDURE — 3077F SYST BP >= 140 MM HG: CPT | Performed by: NURSE PRACTITIONER

## 2023-11-27 RX ORDER — METHOCARBAMOL 500 MG/1
1 TABLET, FILM COATED ORAL 3 TIMES DAILY
COMMUNITY
Start: 2023-10-03

## 2023-11-27 RX ORDER — FINASTERIDE 5 MG/1
1 TABLET, FILM COATED ORAL DAILY
COMMUNITY
End: 2023-11-27

## 2023-11-27 NOTE — PROGRESS NOTES
Subjective:     Encounter Date:11/27/2023    Primary Care Physician: Ayesha Pierce MD      Patient ID: Cal Frausto is a 78 y.o. male.    Chief Complaint:Hypertension and Coronary Artery Disease      PROBLEM LIST:  Coronary artery disease  [facility] for NSTEMI, April 1989: Occluded LAD. RCA and LCx within normal limits.   [facility], 03/2004, for abnormal Carmultivessel disease.  CABG x3, 04/19/2004, Dr. Cardona: LIMA to LAD, SVG to the ramus and OM1, SVG to PDA.   [facility], 11/2004: Occluded SVG to the RCA. 80% LM with an occluded LAD with a patent LIMA graft to the LAD. Occluded OM1 with a patent SVG to the OM.    [facility], 07/30/2013: 100% RCA. Occluded SVG. Patent LIMA with distal LAD occlusion. Widely patent SVG to ramus and OM-1, filling other natives of the collaterals with normal LVEF.  [facility], 05/04/2018: 90% proximal LCx supplying the large left posterior lateral and collaterals to the RCA/right PDA. Successful treatment with 3.0 x 12 Xience EES. Widely patent LIMA however distal LAD occluded. Patent SVG to the ramus branch. 30% disease distal. EF 50%.  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 Acute LLE superficial thrombophlebitis noted in the greater saphenous (above knee) and varicosity (below knee). Chronic LLE superficial thrombophlebitis noted in the greater saphenous (below knee).  Hypertension.  Dyslipidemia.  AAA  CTA Abdomen, 10/16/2017: 5.0 cm infrarenal AAA, followed by Dr. Isabel  09/17/2020 endovascular repair of abdominal aortic aneurysm, Dr. Edwards  CT A/P 09/30/2021: AAA measures 5.5 x 4.8 cm, previously was 6.2 x 5.6 cm.   Type 2 diabetes.   Ongoing tobacco abuse.  Renal cancer 2019  Status post left nephrectomy  History of alcohol abuse, none since 1977.  COPD with nocturnal oxygen use.  Arthritis.  Hypothyroidism.  History of childhood seizures.  Left wrist cyst removal x2.  Left carpal tunnel surgery.  Bell’s palsy in  2012.      Allergies   Allergen Reactions    Metformin Diarrhea         Current Outpatient Medications:     aspirin 81 MG tablet, Take 1 tablet by mouth Daily., Disp: , Rfl:     atenolol (TENORMIN) 50 MG tablet, Take 1 tablet by mouth Daily., Disp: , Rfl:     dapagliflozin (Farxiga) 5 MG tablet tablet, Take 1 tablet by mouth Daily., Disp: , Rfl:     fenofibrate 160 MG tablet, Take 1 tablet by mouth Daily., Disp: , Rfl:     isosorbide mononitrate (IMDUR) 60 MG 24 hr tablet, Take 1 tablet by mouth Daily., Disp: , Rfl:     levothyroxine (SYNTHROID, LEVOTHROID) 112 MCG tablet, Take 1 tablet by mouth Daily., Disp: , Rfl:     losartan-hydrochlorothiazide (HYZAAR) 100-25 MG per tablet, Take 0.5 tablets by mouth Daily., Disp: , Rfl:     methocarbamol (ROBAXIN) 500 MG tablet, Take 1 tablet by mouth 3 times a day., Disp: , Rfl:     nitroglycerin (NITROSTAT) 0.4 MG SL tablet, 1 under the tongue as needed for angina, may repeat q5mins for up three doses (Patient taking differently: Place 1 tablet under the tongue Every 5 (Five) Minutes As Needed. 1 under the tongue as needed for angina, may repeat q5mins for up three doses), Disp: 25 tablet, Rfl: 3    pioglitazone (ACTOS) 15 MG tablet, Take 1 tablet by mouth Daily., Disp: , Rfl:     rosuvastatin (CRESTOR) 40 MG tablet, Take 1 tablet by mouth Every Night., Disp: 90 tablet, Rfl: 3    SITagliptin (JANUVIA) 100 MG tablet, Take 1 tablet by mouth Daily., Disp: , Rfl:     thyroid (ARMOUR) 15 MG tablet, Take 1 tablet by mouth Daily., Disp: , Rfl:     warfarin (COUMADIN) 5 MG tablet, Take 1 tablet by mouth Daily. Currently 4 mg, Disp: , Rfl:         History of Present Illness    Patient is a 78-year-old  male who presents today for annual follow-up of coronary artery disease.  Since last being seen patient notes overall doing well from cardiac standpoint.  Denies any chest pain, pressure, tightness.  Denies any increasing shortness of breath.  No reported syncope, presyncope,  "or edema.  Still follows with CT surgery for history of AAA.  Had blood work done with his primary care physician about a month ago.  Patient notes compliance with his medications.  No current bleeding issues.    The following portions of the patient's history were reviewed and updated as appropriate: allergies, current medications, past family history, past medical history, past social history, past surgical history and problem list.      Social History     Tobacco Use    Smoking status: Every Day     Packs/day: 0.50     Years: 54.00     Additional pack years: 0.00     Total pack years: 27.00     Types: Cigarettes    Smokeless tobacco: Never    Tobacco comments:     smoked about 54 years.    Vaping Use    Vaping Use: Never used   Substance Use Topics    Alcohol use: Not Currently     Comment: Alcohol abuse, quit 1977    Drug use: Never         ROS       Objective:   /80 (BP Location: Left arm, Patient Position: Sitting, Cuff Size: Adult)   Pulse 71   Ht 160 cm (63\")   Wt 83 kg (183 lb)   SpO2 95%   BMI 32.42 kg/m²         Vitals reviewed.   Constitutional:       Appearance: Well-developed and not in distress.   Neck:      Vascular: No JVD.      Trachea: No tracheal deviation.   Pulmonary:      Effort: Pulmonary effort is normal.      Breath sounds: Normal breath sounds.   Cardiovascular:      Normal rate. Regular rhythm.      Murmurs: There is no murmur.   Edema:     Peripheral edema absent.   Musculoskeletal:         General: No deformity. Skin:     General: Skin is warm and dry.   Neurological:      Mental Status: Alert and oriented to person, place, and time.           ECG 12 Lead    Date/Time: 11/27/2023 11:46 AM  Performed by: Keira Langley APRN    Authorized by: Keira Langley APRN  Comparison: compared with previous ECG from 12/6/2021  Comparison to previous ECG: Nonspecific ST-T wave changes, some of which are motion artifact.  Rhythm: sinus rhythm  Other findings: non-specific ST-T wave " changes                Assessment:   Assessment & Plan      Diagnoses and all orders for this visit:    1. Coronary artery disease involving coronary bypass graft of native heart without angina pectoris (Primary), stable.  No active angina.  On aspirin.    2. Essential hypertension, mildly elevated today.  Overall has been previously controlled.  On beta-blocker and losartan/HCTZ.    3. Dyslipidemia, labs with primary care.  On statin.      Plan:  Patient overall stable from cardiac standpoint.  Continue current cardiac medications.  Discussed possibility of transitioning from warfarin to NOAC.  At this time patient wishes to remain on warfarin.  Will attempt to obtain most recent labs from primary care physician.  Follow-up in 1 years time or sooner if needed.       Keira WOODS     Advance Care Planning   ACP discussion was held with the patient during this visit. Patient does not have an advance directive, declines further assistance.        Dictated utilizing Dragon dictation

## 2023-11-27 NOTE — LETTER
November 27, 2023       No Recipients    Patient: Cal Frausto   YOB: 1945   Date of Visit: 11/27/2023     Dear Ayesha Pierce MD:       Thank you for referring Cal Frausto to me for evaluation. Below are the relevant portions of my assessment and plan of care.    If you have questions, please do not hesitate to call me. I look forward to following Cal along with you.         Sincerely,        EPGGY Jj        CC:   No Recipients    Keira Langley APRN  11/27/23 1147  Sign when Signing Visit  Subjective:     Encounter Date:11/27/2023    Primary Care Physician: Ayesha Pierce MD      Patient ID: Cal Frausto is a 78 y.o. male.    Chief Complaint:Hypertension and Coronary Artery Disease      PROBLEM LIST:  Coronary artery disease  [facility] for NSTEMI, April 1989: Occluded LAD. RCA and LCx within normal limits.   [facility], 03/2004, for abnormal Carmultivessel disease.  CABG x3, 04/19/2004, Dr. Cardona: LIMA to LAD, SVG to the ramus and OM1, SVG to PDA.   [facility], 11/2004: Occluded SVG to the RCA. 80% LM with an occluded LAD with a patent LIMA graft to the LAD. Occluded OM1 with a patent SVG to the OM.    [facility], 07/30/2013: 100% RCA. Occluded SVG. Patent LIMA with distal LAD occlusion. Widely patent SVG to ramus and OM-1, filling other natives of the collaterals with normal LVEF.  [facility], 05/04/2018: 90% proximal LCx supplying the large left posterior lateral and collaterals to the RCA/right PDA. Successful treatment with 3.0 x 12 Xience EES. Widely patent LIMA however distal LAD occluded. Patent SVG to the ramus branch. 30% disease distal. EF 50%.  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 Acute LLE superficial thrombophlebitis noted in the greater saphenous (above knee) and varicosity (below knee). Chronic LLE superficial thrombophlebitis noted in the greater saphenous (below knee).  Hypertension.  Dyslipidemia.  AAA  CTA  Abdomen, 10/16/2017: 5.0 cm infrarenal AAA, followed by Dr. Isabel  09/17/2020 endovascular repair of abdominal aortic aneurysm, Dr. Edwards  CT A/P 09/30/2021: AAA measures 5.5 x 4.8 cm, previously was 6.2 x 5.6 cm.   Type 2 diabetes.   Ongoing tobacco abuse.  Renal cancer 2019  Status post left nephrectomy  History of alcohol abuse, none since 1977.  COPD with nocturnal oxygen use.  Arthritis.  Hypothyroidism.  History of childhood seizures.  Left wrist cyst removal x2.  Left carpal tunnel surgery.  Bell’s palsy in 2012.      Allergies   Allergen Reactions   • Metformin Diarrhea         Current Outpatient Medications:   •  aspirin 81 MG tablet, Take 1 tablet by mouth Daily., Disp: , Rfl:   •  atenolol (TENORMIN) 50 MG tablet, Take 1 tablet by mouth Daily., Disp: , Rfl:   •  dapagliflozin (Farxiga) 5 MG tablet tablet, Take 1 tablet by mouth Daily., Disp: , Rfl:   •  fenofibrate 160 MG tablet, Take 1 tablet by mouth Daily., Disp: , Rfl:   •  isosorbide mononitrate (IMDUR) 60 MG 24 hr tablet, Take 1 tablet by mouth Daily., Disp: , Rfl:   •  levothyroxine (SYNTHROID, LEVOTHROID) 112 MCG tablet, Take 1 tablet by mouth Daily., Disp: , Rfl:   •  losartan-hydrochlorothiazide (HYZAAR) 100-25 MG per tablet, Take 0.5 tablets by mouth Daily., Disp: , Rfl:   •  methocarbamol (ROBAXIN) 500 MG tablet, Take 1 tablet by mouth 3 times a day., Disp: , Rfl:   •  nitroglycerin (NITROSTAT) 0.4 MG SL tablet, 1 under the tongue as needed for angina, may repeat q5mins for up three doses (Patient taking differently: Place 1 tablet under the tongue Every 5 (Five) Minutes As Needed. 1 under the tongue as needed for angina, may repeat q5mins for up three doses), Disp: 25 tablet, Rfl: 3  •  pioglitazone (ACTOS) 15 MG tablet, Take 1 tablet by mouth Daily., Disp: , Rfl:   •  rosuvastatin (CRESTOR) 40 MG tablet, Take 1 tablet by mouth Every Night., Disp: 90 tablet, Rfl: 3  •  SITagliptin (JANUVIA) 100 MG tablet, Take 1 tablet by mouth Daily.,  "Disp: , Rfl:   •  thyroid (ARMOUR) 15 MG tablet, Take 1 tablet by mouth Daily., Disp: , Rfl:   •  warfarin (COUMADIN) 5 MG tablet, Take 1 tablet by mouth Daily. Currently 4 mg, Disp: , Rfl:         History of Present Illness    Patient is a 78-year-old  male who presents today for annual follow-up of coronary artery disease.  Since last being seen patient notes overall doing well from cardiac standpoint.  Denies any chest pain, pressure, tightness.  Denies any increasing shortness of breath.  No reported syncope, presyncope, or edema.  Still follows with CT surgery for history of AAA.  Had blood work done with his primary care physician about a month ago.  Patient notes compliance with his medications.  No current bleeding issues.    The following portions of the patient's history were reviewed and updated as appropriate: allergies, current medications, past family history, past medical history, past social history, past surgical history and problem list.      Social History     Tobacco Use   • Smoking status: Every Day     Packs/day: 0.50     Years: 54.00     Additional pack years: 0.00     Total pack years: 27.00     Types: Cigarettes   • Smokeless tobacco: Never   • Tobacco comments:     smoked about 54 years.    Vaping Use   • Vaping Use: Never used   Substance Use Topics   • Alcohol use: Not Currently     Comment: Alcohol abuse, quit 1977   • Drug use: Never         ROS       Objective:   /80 (BP Location: Left arm, Patient Position: Sitting, Cuff Size: Adult)   Pulse 71   Ht 160 cm (63\")   Wt 83 kg (183 lb)   SpO2 95%   BMI 32.42 kg/m²         Vitals reviewed.   Constitutional:       Appearance: Well-developed and not in distress.   Neck:      Vascular: No JVD.      Trachea: No tracheal deviation.   Pulmonary:      Effort: Pulmonary effort is normal.      Breath sounds: Normal breath sounds.   Cardiovascular:      Normal rate. Regular rhythm.      Murmurs: There is no murmur.   Edema:     " Peripheral edema absent.   Musculoskeletal:         General: No deformity. Skin:     General: Skin is warm and dry.   Neurological:      Mental Status: Alert and oriented to person, place, and time.           ECG 12 Lead    Date/Time: 11/27/2023 11:46 AM  Performed by: Keira Langley APRN    Authorized by: Keira Langley APRN  Comparison: compared with previous ECG from 12/6/2021  Comparison to previous ECG: Nonspecific ST-T wave changes, some of which are motion artifact.  Rhythm: sinus rhythm  Other findings: non-specific ST-T wave changes                Assessment:   Assessment & Plan     Diagnoses and all orders for this visit:    1. Coronary artery disease involving coronary bypass graft of native heart without angina pectoris (Primary), stable.  No active angina.  On aspirin.    2. Essential hypertension, mildly elevated today.  Overall has been previously controlled.  On beta-blocker and losartan/HCTZ.    3. Dyslipidemia, labs with primary care.  On statin.      Plan:  Patient overall stable from cardiac standpoint.  Continue current cardiac medications.  Discussed possibility of transitioning from warfarin to NOAC.  At this time patient wishes to remain on warfarin.  Will attempt to obtain most recent labs from primary care physician.  Follow-up in 1 years time or sooner if needed.       Keira WOODS     Advance Care Planning  ACP discussion was held with the patient during this visit. Patient does not have an advance directive, declines further assistance.        Dictated utilizing Dragon dictation

## 2023-12-28 ENCOUNTER — HOSPITAL ENCOUNTER (OUTPATIENT)
Dept: CT IMAGING | Facility: HOSPITAL | Age: 78
Discharge: HOME | End: 2023-12-28
Admitting: NURSE PRACTITIONER
Payer: MEDICARE

## 2023-12-28 DIAGNOSIS — I71.40 ABDOMINAL AORTIC ANEURYSM (AAA) WITHOUT RUPTURE, UNSPECIFIED PART: ICD-10-CM

## 2023-12-28 PROCEDURE — 74176 CT ABD & PELVIS W/O CONTRAST: CPT

## 2023-12-29 NOTE — PROGRESS NOTES
..     [facility] Cardiothoracic Surgery Office Follow Up Note    Date of Encounter: 2024     Name: Cal Frausto  : 1945     Referred By: No ref. provider found  PCP: Ayesha Pierce MD    Chief Complaint:    Chief Complaint   Patient presents with    Follow-up     1 YR FU with CT Abd / Pelvis for Hx of EVAR 20       Subjective      History of Present Illness:    It was nice to see Cal Frausto in follow up.  He is a pleasant 78 y.o. male with PMH significant for current smoker, hypertension, hyperlipidemia on statin therapy, deep vein thrombosis, renal cell cancer s/p nephrectomy (10/2019), coronary artery disease s/p stenting and CABG, type 2 diabetes mellitus, and abdominal aortic aneurysm s/p endovascular repair by Dr. Edwards on 2020..      Patient was last seen in office by myself on 2022 at which time he reported continued intermittent chest pain with dyspnea on exertion.  CT abdomen/pelvis with stable findings.  Native sac was measured at 5.8 to 6 cm compared to a previous measurement of 6 cm, without endoleak.  Mr. Frausto presents today for annual surveillance with imaging.  He denies unusual abdomen, back, or pelvic pain.  Patient follows with Cardiology (Dr. Aguilera) and Nephrology who manage his blood pressure medications together he reports.     Review of Systems:  Review of Systems   Constitutional: Negative for chills, decreased appetite, diaphoresis, fever, malaise/fatigue, night sweats, weight gain and weight loss.   HENT:  Positive for hearing loss. Negative for hoarse voice.    Eyes:  Negative for blurred vision, double vision and visual disturbance.   Cardiovascular:  Positive for dyspnea on exertion. Negative for chest pain, claudication, irregular heartbeat, leg swelling, near-syncope, orthopnea, palpitations, paroxysmal nocturnal dyspnea and syncope.   Respiratory:  Positive for cough and wheezing. Negative for hemoptysis, shortness of breath and sputum  production.    Hematologic/Lymphatic: Negative for adenopathy and bleeding problem. Does not bruise/bleed easily.   Skin:  Negative for color change, nail changes, poor wound healing and rash.   Musculoskeletal:  Positive for back pain and falls (recent fall). Negative for muscle cramps.   Gastrointestinal:  Negative for abdominal pain, dysphagia and heartburn.   Genitourinary:  Negative for flank pain.   Neurological:  Negative for brief paralysis, disturbances in coordination, dizziness, focal weakness, headaches, light-headedness, loss of balance, numbness, paresthesias, sensory change, vertigo and weakness.   Psychiatric/Behavioral:  Negative for depression and suicidal ideas.    Allergic/Immunologic: Negative for persistent infections.     I have reviewed the following portions of the patient's history: problem list, current medications, allergies, past surgical history, past medical history, past social history, past family history, and ROS and confirm it's accurate.    Allergies:  Allergies   Allergen Reactions    Metformin Diarrhea       Medications:      Current Outpatient Medications:     acetaminophen (TYLENOL) 500 MG tablet, Take 1 tablet by mouth Every 6 (Six) Hours As Needed for Mild Pain., Disp: , Rfl:     aspirin 81 MG tablet, Take 1 tablet by mouth Daily., Disp: , Rfl:     atenolol (TENORMIN) 50 MG tablet, Take 1 tablet by mouth Daily., Disp: , Rfl:     dapagliflozin (Farxiga) 5 MG tablet tablet, Take 1 tablet by mouth Daily., Disp: , Rfl:     fenofibrate 160 MG tablet, Take 1 tablet by mouth Daily., Disp: , Rfl:     isosorbide mononitrate (IMDUR) 60 MG 24 hr tablet, Take 1 tablet by mouth Daily., Disp: , Rfl:     levothyroxine (SYNTHROID, LEVOTHROID) 112 MCG tablet, Take 1 tablet by mouth Daily., Disp: , Rfl:     losartan-hydrochlorothiazide (HYZAAR) 100-25 MG per tablet, Take 0.5 tablets by mouth Daily., Disp: , Rfl:     methocarbamol (ROBAXIN) 500 MG tablet, Take 1 tablet by mouth 3 times a day.,  Disp: , Rfl:     nitroglycerin (NITROSTAT) 0.4 MG SL tablet, 1 under the tongue as needed for angina, may repeat q5mins for up three doses (Patient taking differently: Place 1 tablet under the tongue Every 5 (Five) Minutes As Needed. 1 under the tongue as needed for angina, may repeat q5mins for up three doses), Disp: 25 tablet, Rfl: 3    oxyCODONE-acetaminophen (PERCOCET) 5-325 MG per tablet, Take 1 tablet by mouth Every 6 (Six) Hours As Needed. for pain, Disp: , Rfl:     rosuvastatin (CRESTOR) 40 MG tablet, Take 1 tablet by mouth Every Night., Disp: 90 tablet, Rfl: 3    SITagliptin (JANUVIA) 100 MG tablet, Take 1 tablet by mouth Daily., Disp: , Rfl:     thyroid (ARMOUR) 15 MG tablet, Take 1 tablet by mouth Daily., Disp: , Rfl:     warfarin (COUMADIN) 5 MG tablet, Take 1 tablet by mouth Daily. Currently 4 mg, Disp: , Rfl:     pioglitazone (ACTOS) 15 MG tablet, Take 1 tablet by mouth Daily. 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    History of seizures as a child     Hypertension     Hypothyroidism     Pneumonia     Seizures     History of childhood seizures.    Tobacco abuse     Ongoing    Type 2 diabetes mellitus        Past Surgical History:   Procedure Laterality Date    ABDOMINAL AORTIC ANEURYSM REPAIR WITH ENDOGRAFT N/A 9/17/2020    Procedure: ABDOMINAL AORTIC ANEURYSM REPAIR WITH ENDOGRAFT;  Surgeon: Flynn Edwards MD;  Location: [facility] OR ;  Service: Cardiothoracic;  Laterality: N/A;  FLUORO - 13 MINS 12 SECS  DOSE - 716mGy  CONTRAST - 70 ml isovue 300     CARDIAC CATHETERIZATION  11/2004    revealed an occluded saphenous vein graft to the RCA. There was an 80% left main with an occluded LAD with a patent LIMA graft to the LAD. There was also an occluded OM1 with a patent saphenous vein graft to the OM.    CARDIAC CATHETERIZATION  07/30/2013    with 100% RCA. Occluded SVG. With 100% LAD. Patent LINDSEY with distal LAD occlusion. Widely patent SVG to ramus and OM-1, filling other natives of the collaterals with normal LVEF.    CARDIAC CATHETERIZATION N/A 5/4/2018    Procedure: Left Heart Cath;  Surgeon: Keegan Aguilera MD;  Location:  [facility]ATION;  Service: Cardiovascular    CARPAL TUNNEL RELEASE      COLONOSCOPY  2018    COLONOSCOPY N/A 12/6/2021    Procedure: COLONOSCOPY;  Surgeon: Brunner, Mark I, MD;  Location:  [facility];  Service: Gastroenterology;  Laterality: N/A;    CORONARY ANGIOPLASTY WITH STENT PLACEMENT      CORONARY ARTERY BYPASS GRAFT  2004    x3    CYST REMOVAL Left     wrist x2    ENDOSCOPY N/A 12/5/2021    Procedure: ESOPHAGOGASTRODUODENOSCOPY;  Surgeon: Shane Kelly MD;  Location:  [facility];  Service: Gastroenterology;  Laterality: N/A;    KIDNEY SURGERY  2018    POLYPECTOMY         Social History     Socioeconomic History    Marital status:     Number of children: 3   Tobacco Use    Smoking status: Every Day     Packs/day: 0.50     Years: 54.00     Additional pack years: 0.00     Total pack years: 27.00     Types: Cigarettes    Smokeless tobacco: Never    Tobacco comments:     smoked about 54 years.    Vaping Use    Vaping Use: Never used   Substance and Sexual Activity    Alcohol use: Not Currently     Comment: Alcohol abuse, quit 1977    Drug use: Never    Sexual activity: Defer        Family History   Problem Relation Age of Onset    No Known Problems Sister        Objective     Physical Exam:  Vitals:    01/02/24 1112 01/02/24 1113   BP: 170/90 170/80   BP Location: Left arm Right arm   Patient Position:  "Sitting Sitting   Pulse: 70    Temp: 98.6 °F (37 °C)    SpO2: 98%    Weight: 80.7 kg (178 lb)    Height: 170.2 cm (67\")  Comment: patient reported       Body mass index is 27.88 kg/m².    Physical Exam  Vitals reviewed.   Constitutional:       General: He is not in acute distress.     Appearance: Normal appearance. He is not ill-appearing.   Cardiovascular:      Rate and Rhythm: Normal rate and regular rhythm.      Heart sounds: No murmur heard.  Pulmonary:      Effort: Pulmonary effort is normal.      Breath sounds: Normal breath sounds.   Skin:     General: Skin is warm and dry.   Neurological:      General: No focal deficit present.      Mental Status: He is alert and oriented to person, place, and time.   Psychiatric:         Mood and Affect: Mood normal.         Behavior: Behavior normal.         Thought Content: Thought content normal.         Judgment: Judgment normal.         Imaging/Labs:  CT abdomen/pelvis  Result Date 12/29/2023  Impression:  1. Stable size and appearance of the patient's previously stented abdominal aortic aneurysm.  2. Stable fat-only containing nonstrangulated ventral midline hernias.  3. 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  Electronically Signed: Leroy Roy MD    12/29/2023 4:48 PM EST    Workstation ID: [id number]  CT Abdomen Pelvis Without Contrast (12/28/2023 14:49)      ..I personally reviewed this report and imaging.    Assessment / Plan      Assessment / Plan:  PMH significant for current smoker, hypertension, hyperlipidemia on statin therapy, deep vein thrombosis, renal cell cancer s/p nephrectomy (10/2019), coronary artery disease s/p stenting and CABG, type 2 diabetes mellitus, and abdominal aortic aneurysm s/p endovascular repair by Dr. Edwards on 9/17/2020..      S/p EVAR, 9/17/2020 (Dr. Edwards)  Infrarenal abdominal aortic aneurysm -- August 2020, measured at 6.4 cm.   Last seen in " office on 11/29/2022,  CT abdomen/pelvis with stable findings.   Native sac measured at 5.8 to 6 cm, without endoleak.   Presents today for annual surveillance with imaging.  Denies unusual abdomen, back, or pelvic pain.  CT abdomen/pelvis with stable findings as resulted above under imaging/labs.   Native sac measured at 5.5 cm, without endoleak.   Hypertensive in office.  Patient states he usually runs around 165 at home.  This is above recommended parameters in the setting of aneurysm.  However, patient does have only 1 kidney.   Blood pressure managed by Cardiology (Dr. Aguilera) and Nephrology.   He does continue to smoke.     Follow Up:   We will plan for follow-up in 1 year with US.   Patient encouraged to call the office with any questions or concerns.     Thank you for allowing me to participate in your care.  Best Regards,    PEGGY Oliveira  [facility] Cardiothoracic Surgery  01/02/24  11:15 EST

## 2024-01-02 ENCOUNTER — OFFICE VISIT (OUTPATIENT)
Dept: CARDIAC SURGERY | Facility: CLINIC | Age: 79
End: 2024-01-02
Payer: MEDICARE

## 2024-01-02 VITALS
TEMPERATURE: 98.6 F | HEIGHT: 67 IN | DIASTOLIC BLOOD PRESSURE: 80 MMHG | SYSTOLIC BLOOD PRESSURE: 170 MMHG | WEIGHT: 178 LBS | OXYGEN SATURATION: 98 % | BODY MASS INDEX: 27.94 KG/M2 | HEART RATE: 70 BPM

## 2024-01-02 DIAGNOSIS — Z86.79 S/P AAA REPAIR: Primary | ICD-10-CM

## 2024-01-02 DIAGNOSIS — Z98.890 S/P AAA REPAIR: Primary | ICD-10-CM

## 2024-01-02 PROBLEM — I71.40 ABDOMINAL AORTIC ANEURYSM (AAA) WITHOUT RUPTURE: Status: RESOLVED | Noted: 2020-08-26 | Resolved: 2024-01-02

## 2024-01-02 PROBLEM — D64.9 ANEMIA: Status: RESOLVED | Noted: 2021-12-04 | Resolved: 2024-01-02

## 2024-01-02 PROCEDURE — 3079F DIAST BP 80-89 MM HG: CPT | Performed by: REGISTERED NURSE

## 2024-01-02 PROCEDURE — 3077F SYST BP >= 140 MM HG: CPT | Performed by: REGISTERED NURSE

## 2024-01-02 PROCEDURE — 99212 OFFICE O/P EST SF 10 MIN: CPT | Performed by: REGISTERED NURSE

## 2024-01-02 PROCEDURE — 1159F MED LIST DOCD IN RCRD: CPT | Performed by: REGISTERED NURSE

## 2024-01-02 PROCEDURE — 1160F RVW MEDS BY RX/DR IN RCRD: CPT | Performed by: REGISTERED NURSE

## 2024-01-02 RX ORDER — ACETAMINOPHEN 500 MG
500 TABLET ORAL EVERY 6 HOURS PRN
COMMUNITY

## 2024-01-02 RX ORDER — OXYCODONE HYDROCHLORIDE AND ACETAMINOPHEN 5; 325 MG/1; MG/1
1 TABLET ORAL EVERY 6 HOURS PRN
COMMUNITY
Start: 2023-12-20

## 2024-01-12 NOTE — TELEPHONE ENCOUNTER
Low to moderate CV risk if no angina  
Simone with Dr. Edwards's office is requesting pre op risk assmt for  endovascular repair of his abdominal aortic aneurysm scheduled for 9/17/20   
Minced/moist solids, mildly thick fluids
Easy to chew solids (cut-up & provide extra gravy to improve moisture), w/MODERATELY thick liquids

## 2024-02-15 ENCOUNTER — HOSPITAL ENCOUNTER (EMERGENCY)
Facility: HOSPITAL | Age: 79
Discharge: HOME | End: 2024-02-15
Attending: EMERGENCY MEDICINE
Payer: MEDICARE

## 2024-02-15 ENCOUNTER — APPOINTMENT (OUTPATIENT)
Dept: GENERAL RADIOLOGY | Facility: HOSPITAL | Age: 79
End: 2024-02-15
Payer: MEDICARE

## 2024-02-15 VITALS
RESPIRATION RATE: 16 BRPM | HEART RATE: 75 BPM | DIASTOLIC BLOOD PRESSURE: 102 MMHG | TEMPERATURE: 97.9 F | OXYGEN SATURATION: 93 % | HEIGHT: 67 IN | BODY MASS INDEX: 27 KG/M2 | WEIGHT: 172 LBS | SYSTOLIC BLOOD PRESSURE: 181 MMHG

## 2024-02-15 DIAGNOSIS — M25.512 ACUTE PAIN OF LEFT SHOULDER: Primary | ICD-10-CM
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
## 2024-02-15 PROCEDURE — 73030 X-RAY EXAM OF SHOULDER: CPT

## 2024-02-15 PROCEDURE — 99283 EMERGENCY DEPT VISIT LOW MDM: CPT

## 2024-02-15 RX ORDER — HYDROCHLOROTHIAZIDE 25 MG/1
25 TABLET ORAL
Status: DISCONTINUED | OUTPATIENT
Start: 2024-02-15 | End: 2024-02-15

## 2024-02-15 RX ORDER — ATENOLOL 50 MG/1
50 TABLET ORAL ONCE
Status: COMPLETED | OUTPATIENT
Start: 2024-02-15 | End: 2024-02-15

## 2024-02-15 RX ORDER — OXYCODONE HYDROCHLORIDE 10 MG/1
10 TABLET ORAL ONCE
Status: COMPLETED | OUTPATIENT
Start: 2024-02-15 | End: 2024-02-15

## 2024-02-15 RX ORDER — HYDROCHLOROTHIAZIDE 25 MG/1
25 TABLET ORAL
Status: DISCONTINUED | OUTPATIENT
Start: 2024-02-15 | End: 2024-02-15 | Stop reason: HOSPADM

## 2024-02-15 RX ORDER — LOSARTAN POTASSIUM 50 MG/1
100 TABLET ORAL
Status: DISCONTINUED | OUTPATIENT
Start: 2024-02-15 | End: 2024-02-15 | Stop reason: HOSPADM

## 2024-02-15 RX ORDER — ACETAMINOPHEN 500 MG
1000 TABLET ORAL ONCE
Status: COMPLETED | OUTPATIENT
Start: 2024-02-15 | End: 2024-02-15

## 2024-02-15 RX ORDER — LOSARTAN POTASSIUM 50 MG/1
100 TABLET ORAL
Status: DISCONTINUED | OUTPATIENT
Start: 2024-02-15 | End: 2024-02-15

## 2024-02-15 RX ORDER — IBUPROFEN 800 MG/1
800 TABLET ORAL ONCE
Status: COMPLETED | OUTPATIENT
Start: 2024-02-15 | End: 2024-02-15

## 2024-02-15 RX ADMIN — HYDROCHLOROTHIAZIDE 25 MG: 25 TABLET ORAL at 04:32

## 2024-02-15 RX ADMIN — ATENOLOL 50 MG: 50 TABLET ORAL at 04:32

## 2024-02-15 RX ADMIN — ACETAMINOPHEN 1000 MG: 500 TABLET ORAL at 04:22

## 2024-02-15 RX ADMIN — LOSARTAN POTASSIUM 100 MG: 50 TABLET, FILM COATED ORAL at 04:32

## 2024-02-15 RX ADMIN — IBUPROFEN 800 MG: 800 TABLET, FILM COATED ORAL at 04:22

## 2024-02-15 RX ADMIN — OXYCODONE HYDROCHLORIDE 10 MG: 10 TABLET ORAL at 04:22

## 2024-02-15 NOTE — ED PROVIDER NOTES
Subjective   History of Present Illness  Patient presents for evaluation of acute onset left-sided shoulder pain that occurred yesterday evening when he had a fall from standing height.  He has been taking his home prescribed medication oxycodone without substantial relief.  He has difficulty moving the shoulder because of the pain.  No numbness or weakness.  No other injuries.    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    History of seizures as a child     Hypertension     Hypothyroidism     Pneumonia     Seizures     History of childhood seizures.    Tobacco abuse     Ongoing    Type 2 diabetes mellitus        Allergies   Allergen Reactions    Metformin Diarrhea       Past Surgical History:   Procedure Laterality Date    ABDOMINAL AORTIC ANEURYSM REPAIR WITH ENDOGRAFT N/A 9/17/2020    Procedure: ABDOMINAL AORTIC ANEURYSM REPAIR WITH ENDOGRAFT;  Surgeon: Flynn Edwards MD;  Location: [place] [address];  Service: Cardiothoracic;  Laterality: N/A;  FLUORO - 13 MINS 12 SECS  DOSE - 716mGy  CONTRAST - 70 ml isovue 300    CARDIAC CATHETERIZATION  11/2004    revealed an occluded saphenous vein graft to the RCA. There was an 80% left main with an occluded LAD with a patent LIMA graft to the LAD. There was also an occluded OM1 with a patent saphenous vein graft to the OM.    CARDIAC  CATHETERIZATION  07/30/2013    with 100% RCA. Occluded SVG. With 100% LAD. Patent LINDSEY with distal LAD occlusion. Widely patent SVG to ramus and OM-1, filling other natives of the collaterals with normal LVEF.    CARDIAC CATHETERIZATION N/A 5/4/2018    Procedure: Left Heart Cath;  Surgeon: Keegan Aguilera MD;  Location:  [facility];  Service: Cardiovascular    CARPAL TUNNEL RELEASE      COLONOSCOPY  2018    COLONOSCOPY N/A 12/6/2021    Procedure: COLONOSCOPY;  Surgeon: Brunner, Mark I, MD;  Location:  [facility];  Service: Gastroenterology;  Laterality: N/A;    CORONARY ANGIOPLASTY WITH STENT PLACEMENT      CORONARY ARTERY BYPASS GRAFT  2004    x3    CYST REMOVAL Left     wrist x2    ENDOSCOPY N/A 12/5/2021    Procedure: ESOPHAGOGASTRODUODENOSCOPY;  Surgeon: Shane Kelly MD;  Location:  [facility];  Service: Gastroenterology;  Laterality: N/A;    KIDNEY SURGERY  2018    POLYPECTOMY         Family History   Problem Relation Age of Onset    No Known Problems Sister        Social History     Socioeconomic History    Marital status:     Number of children: 3   Tobacco Use    Smoking status: Every Day     Packs/day: 0.50     Years: 54.00     Additional pack years: 0.00     Total pack years: 27.00     Types: Cigarettes    Smokeless tobacco: Never    Tobacco comments:     smoked about 55 years.    Vaping Use    Vaping Use: Never used   Substance and Sexual Activity    Alcohol use: Not Currently     Comment: Alcohol abuse, quit 1977    Drug use: Never    Sexual activity: Defer           Objective   Physical Exam  Constitutional:       General: He is not in acute distress.  HENT:      Head: Normocephalic and atraumatic.   Eyes:      Conjunctiva/sclera: Conjunctivae normal.      Pupils: Pupils are equal, round, and reactive to light.   Cardiovascular:      Rate and Rhythm: Normal rate and regular rhythm.      Pulses: Normal pulses.      Heart sounds: No murmur heard.     No gallop.    Pulmonary:      Effort: Pulmonary effort is normal. No respiratory distress.   Abdominal:      General: Abdomen is flat. There is no distension.      Tenderness: There is no abdominal tenderness.   Musculoskeletal:      Comments: Normal strength and sensation in the left upper extremity.  Range of motion of the left shoulder is significantly limited due to pain.   Skin:     General: Skin is warm and dry.      Capillary Refill: Capillary refill takes less than 2 seconds.   Neurological:      General: No focal deficit present.      Mental Status: He is alert and oriented to person, place, and time.   Psychiatric:         Mood and Affect: Mood normal.         Behavior: Behavior normal.         Procedures           ED Course                                             Medical Decision Making  [place] diagnosis includes fracture, dislocation, soft tissue injury.  Radiographs obtained.  Patient hypertensive in the ER, missed his home antihypertensives and so these were administered.  Patient was given 1 g p.o. Tylenol, 800 mg p.o. ibuprofen, 10 mg p.o. oxycodone for pain relief and then reassessed.    Patient was placed in a sling and discharged in good condition.  He states he has an orthopedic physician that he will call to schedule follow-up appointment.    Problems Addressed:  Acute pain of left shoulder: complicated acute illness or injury  Fall, initial encounter: complicated acute illness or injury    Amount and/or Complexity of Data Reviewed  Radiology: ordered and independent interpretation performed.     Details: Radiographs of the left shoulder independently interpreted by myself demonstrate no acute bony fracture or dislocation    Risk  OTC drugs.  Prescription drug management.        Final diagnoses:   Acute pain of left shoulder   Fall, initial encounter       ED Disposition  ED Disposition       ED Disposition   Discharge    Condition   Stable    Comment   --             No results found for this or  any previous visit (from the past 24 hour(s)).  Note: In addition to lab results from this visit, the labs listed above may include labs taken at another facility or during a different encounter within the last 24 hours. Please correlate lab times with ED admission and discharge times for further clarification of the services performed during this visit.    XR Shoulder 2+ View Left   Final Result   Impression:   Mild acromioclavicular joint degeneration.            Electronically Signed: Josue Vigil MD     2/15/2024 4:39 AM EST     Workstation ID: [id number]        Vitals:    02/15/24 0410 02/15/24 0415 02/15/24 0420 02/15/24 0430   BP:  158/92  (!) 181/102   BP Location:       Patient Position:       Pulse: 78 70 70 75   Resp:    16   Temp:       TempSrc:       SpO2: 92% 91% 92% 93%   Weight:       Height:         Medications   losartan (COZAAR) tablet 100 mg (100 mg Oral Given 2/15/24 0432)     And   hydroCHLOROthiazide tablet 25 mg (25 mg Oral Given 2/15/24 0432)   acetaminophen (TYLENOL) tablet 1,000 mg (1,000 mg Oral Given 2/15/24 0422)   ibuprofen (ADVIL,MOTRIN) tablet 800 mg (800 mg Oral Given 2/15/24 0422)   oxyCODONE (ROXICODONE) immediate release tablet 10 mg (10 mg Oral Given 2/15/24 0422)   atenolol (TENORMIN) tablet 50 mg (50 mg Oral Given 2/15/24 0432)     ECG/EMG Results (last 24 hours)       ** No results found for the last 24 hours. **          No orders to display         No follow-up provider specified.       Medication List        Changed      nitroglycerin 0.4 MG SL tablet  Commonly known as: NITROSTAT  1 under the tongue as needed for angina, may repeat q5mins for up three doses  What changed:   how much to take  how to take this  when to take this  reasons to take this                 Mp Lopez MD  02/15/24 0458

## 2024-02-15 NOTE — DISCHARGE INSTRUCTIONS
Call your orthopedic doctor tomorrow to schedule a follow-up appointment.  Keep your arm in the sling except for gentle range of motion exercises 1-2 times daily to prevent stiffness in the shoulder.  Continue taking your home pain regimen.  Return to the ER as needed for new or worsening symptoms

## 2024-05-07 ENCOUNTER — OFFICE VISIT (OUTPATIENT)
Dept: PULMONOLOGY | Facility: CLINIC | Age: 79
End: 2024-05-07
Payer: MEDICARE

## 2024-05-07 ENCOUNTER — PREP FOR SURGERY (OUTPATIENT)
Dept: OTHER | Facility: HOSPITAL | Age: 79
End: 2024-05-07
Payer: MEDICARE

## 2024-05-07 ENCOUNTER — PATIENT OUTREACH (OUTPATIENT)
Dept: ONCOLOGY | Facility: CLINIC | Age: 79
End: 2024-05-07
Payer: MEDICARE

## 2024-05-07 VITALS
TEMPERATURE: 97.7 F | HEIGHT: 67 IN | HEART RATE: 79 BPM | SYSTOLIC BLOOD PRESSURE: 142 MMHG | OXYGEN SATURATION: 93 % | BODY MASS INDEX: 23.86 KG/M2 | WEIGHT: 152 LBS | DIASTOLIC BLOOD PRESSURE: 86 MMHG

## 2024-05-07 DIAGNOSIS — R91.8 MULTIPLE PULMONARY NODULES: Primary | ICD-10-CM

## 2024-05-07 DIAGNOSIS — J41.1 MUCOPURULENT CHRONIC BRONCHITIS: ICD-10-CM

## 2024-05-07 DIAGNOSIS — Z72.0 TOBACCO ABUSE: ICD-10-CM

## 2024-05-07 PROCEDURE — 3079F DIAST BP 80-89 MM HG: CPT | Performed by: INTERNAL MEDICINE

## 2024-05-07 PROCEDURE — 99204 OFFICE O/P NEW MOD 45 MIN: CPT | Performed by: INTERNAL MEDICINE

## 2024-05-07 PROCEDURE — 1159F MED LIST DOCD IN RCRD: CPT | Performed by: INTERNAL MEDICINE

## 2024-05-07 PROCEDURE — 94726 PLETHYSMOGRAPHY LUNG VOLUMES: CPT | Performed by: INTERNAL MEDICINE

## 2024-05-07 PROCEDURE — 94729 DIFFUSING CAPACITY: CPT | Performed by: INTERNAL MEDICINE

## 2024-05-07 PROCEDURE — 94375 RESPIRATORY FLOW VOLUME LOOP: CPT | Performed by: INTERNAL MEDICINE

## 2024-05-07 PROCEDURE — 1160F RVW MEDS BY RX/DR IN RCRD: CPT | Performed by: INTERNAL MEDICINE

## 2024-05-07 PROCEDURE — 3077F SYST BP >= 140 MM HG: CPT | Performed by: INTERNAL MEDICINE

## 2024-05-07 RX ORDER — DULAGLUTIDE 1.5 MG/.5ML
INJECTION, SOLUTION SUBCUTANEOUS
COMMUNITY
Start: 2024-04-23

## 2024-05-07 RX ORDER — FINASTERIDE 5 MG/1
1 TABLET, FILM COATED ORAL DAILY
COMMUNITY
Start: 2024-04-04

## 2024-05-07 NOTE — H&P (VIEW-ONLY)
New Patient Pulmonary Office Visit      Patient Name: Cal Frausto    Referring Physician: Ayesha Pierce MD    Chief Complaint:    Chief Complaint   Patient presents with    Lung Nodule     New patient       History of Present Illness: Cal Frausto is a 78 y.o. male who is here today to establish care with Pulmonary.  Patient is a past medical history significant for hypertension, coronary artery disease, hyperlipidemia, diabetes mellitus type 2, hypothyroidism, COPD, and tobacco abuse who was referred to pulmonary for evaluation of pulmonary nodule.  Patient states that he was getting a CT of the chest for lung cancer screening.  Told he had some nodules.  He does have quite a bit of mucus production.  Has been told he has COPD in the past does not use any inhalers.  Still smokes cigarettes.  Denies any nausea, vomit, fever, or chills.    Review of Systems:   Review of Systems   Constitutional:  Negative for chills, fatigue and fever.   HENT:  Negative for congestion and voice change.    Eyes:  Negative for blurred vision.   Respiratory:  Negative for cough, shortness of breath and wheezing.    Cardiovascular:  Negative for chest pain.   Skin:  Negative for dry skin.   Hematological:  Negative for adenopathy.   Psychiatric/Behavioral:  Negative for agitation and depressed mood.        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    History of seizures as a child     Hypertension     Hypothyroidism     Pneumonia     Seizures     History of childhood seizures.    Tobacco abuse     Ongoing    Type 2 diabetes mellitus        Past Surgical History:   Past Surgical History:   Procedure Laterality Date    ABDOMINAL AORTIC ANEURYSM REPAIR WITH ENDOGRAFT N/A 9/17/2020    Procedure: ABDOMINAL AORTIC ANEURYSM REPAIR WITH ENDOGRAFT;  Surgeon: Flynn Edwards MD;  Location: [facility];  Service: Cardiothoracic;  Laterality: N/A;  FLUORO - 13 MINS 12 SECS  DOSE - 716mGy  CONTRAST - 70 ml isovue 300    CARDIAC CATHETERIZATION  11/2004    revealed an occluded saphenous vein graft to the RCA. There was an 80% left main with an occluded LAD with a patent LIMA graft to the LAD. There was also an occluded OM1 with a patent saphenous vein graft to the OM.    CARDIAC CATHETERIZATION  07/30/2013    with 100% RCA. Occluded SVG. With 100% LAD. Patent LINDSEY with distal LAD occlusion. Widely patent SVG to ramus and OM-1, filling other natives of the collaterals with normal LVEF.    CARDIAC CATHETERIZATION N/A 5/4/2018    Procedure: Left Heart Cath;  Surgeon: Keegan Aguilera MD;  Location:  [facility];  Service: Cardiovascular    CARPAL TUNNEL RELEASE      COLONOSCOPY  2018    COLONOSCOPY N/A 12/6/2021    Procedure: COLONOSCOPY;  Surgeon: Brunner, Mark I, MD;  Location:  [facility];  Service: Gastroenterology;  Laterality: N/A;    CORONARY ANGIOPLASTY WITH STENT PLACEMENT      CORONARY ARTERY BYPASS GRAFT  2004    x3    CYST REMOVAL Left     wrist x2    ENDOSCOPY N/A 12/5/2021    Procedure: ESOPHAGOGASTRODUODENOSCOPY;  Surgeon: Shane Kelly MD;  Location:  [facility];  Service: Gastroenterology;  Laterality: N/A;    KIDNEY SURGERY  2018    POLYPECTOMY         Family History:   Family History   Problem Relation Age of Onset    No Known Problems Sister        Social History:   Social History     Socioeconomic  History    Marital status:     Number of children: 3   Tobacco Use    Smoking status: Every Day     Current packs/day: 0.50     Average packs/day: 0.5 packs/day for 54.0 years (27.0 ttl pk-yrs)     Types: Cigarettes    Smokeless tobacco: Never    Tobacco comments:     smoked about 55 years.    Vaping Use    Vaping status: Never Used   Substance and Sexual Activity    Alcohol use: Not Currently     Comment: Alcohol abuse, quit 1977    Drug use: Never    Sexual activity: Defer       Medications:     Current Outpatient Medications:     acetaminophen (TYLENOL) 500 MG tablet, Take 1 tablet by mouth Every 6 (Six) Hours As Needed for Mild Pain., Disp: , Rfl:     aspirin 81 MG tablet, Take 1 tablet by mouth Daily., Disp: , Rfl:     atenolol (TENORMIN) 50 MG tablet, Take 1 tablet by mouth Daily., Disp: , Rfl:     dapagliflozin (Farxiga) 5 MG tablet tablet, Take 1 tablet by mouth Daily., Disp: , Rfl:     fenofibrate 160 MG tablet, Take 1 tablet by mouth Daily., Disp: , Rfl:     finasteride (PROSCAR) 5 MG tablet, Take 1 tablet by mouth Daily., Disp: , Rfl:     isosorbide mononitrate (IMDUR) 60 MG 24 hr tablet, Take 1 tablet by mouth Daily., Disp: , Rfl:     levothyroxine (SYNTHROID, LEVOTHROID) 112 MCG tablet, Take 1 tablet by mouth Daily., Disp: , Rfl:     losartan-hydrochlorothiazide (HYZAAR) 100-25 MG per tablet, Take 0.5 tablets by mouth Daily., Disp: , Rfl:     methocarbamol (ROBAXIN) 500 MG tablet, Take 1 tablet by mouth 3 times a day., Disp: , Rfl:     nitroglycerin (NITROSTAT) 0.4 MG SL tablet, 1 under the tongue as needed for angina, may repeat q5mins for up three doses (Patient taking differently: Place 1 tablet under the tongue Every 5 (Five) Minutes As Needed. 1 under the tongue as needed for angina, may repeat q5mins for up three doses), Disp: 25 tablet, Rfl: 3    oxyCODONE-acetaminophen (PERCOCET) 5-325 MG per tablet, Take 1 tablet by mouth Every 6 (Six) Hours As Needed. for pain, Disp: , Rfl:      "rosuvastatin (CRESTOR) 40 MG tablet, Take 1 tablet by mouth Every Night., Disp: 90 tablet, Rfl: 3    SITagliptin (JANUVIA) 100 MG tablet, Take 1 tablet by mouth Daily., Disp: , Rfl:     thyroid (ARMOUR) 15 MG tablet, Take 1 tablet by mouth Daily., Disp: , Rfl:     Trulicity 1.5 MG/0.5ML solution pen-injector, INJECT ONE SYRINGE UNDER THE SKIN WEEKLY, Disp: , Rfl:     warfarin (COUMADIN) 5 MG tablet, Take 1 tablet by mouth Daily. Currently 4 mg, Disp: , Rfl:     Allergies:   Allergies   Allergen Reactions    Metformin Diarrhea       Physical Exam:  Vital Signs:   Vitals:    05/07/24 1344   BP: 142/86   BP Location: Left arm   Patient Position: Sitting   Cuff Size: Adult   Pulse: 79   Temp: 97.7 °F (36.5 °C)   SpO2: 93%  Comment: Room air at rest   Weight: 68.9 kg (152 lb)   Height: 170.2 cm (67\")       Physical Exam  Vitals and nursing note reviewed.   Constitutional:       General: He is not in acute distress.     Appearance: He is well-developed and normal weight. He is not ill-appearing or toxic-appearing.   Cardiovascular:      Rate and Rhythm: Normal rate and regular rhythm.      Pulses: Normal pulses.      Heart sounds: Normal heart sounds. No murmur heard.     No gallop.   Pulmonary:      Effort: Pulmonary effort is normal.      Breath sounds: Normal breath sounds. No wheezing, rhonchi or rales.   Musculoskeletal:      Right lower leg: No edema.      Left lower leg: No edema.   Neurological:      Mental Status: He is alert.         Immunization History   Administered Date(s) Administered    COVID-19 (MODERNA) 1st,2nd,3rd Dose Monovalent 11/16/2021    Fluzone (or Fluarix & Flulaval for VFC) >6mos 09/28/2016, 11/01/2019, 09/23/2020    Fluzone High Dose =>65 Years ([employer] ONLY) 10/05/2017, 10/09/2018, 10/04/2023       Results Review:   - I personally reviewed the pts imaging from CT scan of the chest from April 2024, shows tree-in-bud opacities throughout the right middle and right lower lobe, and it appears to " have significant amount of debris in the bronchus intermedius extending down to the right lower lobe  - I personally reviewed the pts PFT from 5/7/2024 shows moderate obstruction without restriction and a normal DLCO.  - I personally reviewed the pts chart with regards to evaluation by the patient's PCP    Assessment / Plan:   Diagnoses and all orders for this visit:    1. Multiple pulmonary nodules (Primary)  -The nodules are consistent with tree-in-bud pattern, but during my evaluation also notable is that there is a blockage of the bronchus intermedius extending into the right lower lobe, this may be mucus but also could be a tumor.  I recommend that we can do a an airway evaluation with a BAL possible endobronchial biopsies.  I would plan to culture the right lower lobe at the time of the procedure.  He verbalized understanding agree with the plan.  -I discussed the risk and benefits of the procedure with the patient, this includes but is not limited to hoarseness/vocal cord damage, pain, infection, cough, bleeding, pneumothorax, and anesthesia complications.  Patient verbalized understanding of this and agreed to proceed.    2. Mucopurulent chronic bronchitis  -Will likely benefit from inhalers, holding for now until we do an airway evaluation, he appears to have something setting in the right bronchus intermedius, if it is mucous I do not want to give him any drying agents that could end up causing mucous plugging.  After we do an airway examination we will likely start him on a LABA/LAMA combination inhaler addition to albuterol every 4 hours as needed.    3. Tobacco abuse  -Highly recommend tobacco cessation      Follow Up:   Return in about 6 weeks (around 6/18/2024).     MALORIE Calderon, DO  Pulmonary and Critical Care Medicine  Note Electronically Signed    Part of this note may be an electronic transcription/translation of spoken language to printed text using the Dragon Dictation System.

## 2024-05-08 PROBLEM — R91.8 MULTIPLE PULMONARY NODULES: Status: ACTIVE | Noted: 2024-05-07

## 2024-05-08 RX ORDER — LIDOCAINE HYDROCHLORIDE 40 MG/ML
4 INJECTION, SOLUTION RETROBULBAR; TOPICAL ONCE
OUTPATIENT
Start: 2024-05-08 | End: 2024-05-08

## 2024-05-22 ENCOUNTER — TELEPHONE (OUTPATIENT)
Dept: PULMONOLOGY | Facility: CLINIC | Age: 79
End: 2024-05-22
Payer: MEDICARE

## 2024-05-22 ENCOUNTER — PRE-ADMISSION TESTING (OUTPATIENT)
Dept: PREADMISSION TESTING | Facility: HOSPITAL | Age: 79
End: 2024-05-22
Payer: MEDICARE

## 2024-05-22 VITALS — HEIGHT: 64 IN | WEIGHT: 152.89 LBS | BODY MASS INDEX: 26.1 KG/M2

## 2024-05-22 DIAGNOSIS — R91.8 MULTIPLE PULMONARY NODULES: ICD-10-CM

## 2024-05-22 LAB
ALBUMIN SERPL-MCNC: 3.9 G/DL (ref 3.5–5.2)
ALBUMIN/GLOB SERPL: 1.3 G/DL
ALP SERPL-CCNC: 76 U/L (ref 39–117)
ALT SERPL W P-5'-P-CCNC: 26 U/L (ref 1–41)
ANION GAP SERPL CALCULATED.3IONS-SCNC: 6 MMOL/L (ref 5–15)
APTT PPP: 32 SECONDS (ref 22–39)
AST SERPL-CCNC: 22 U/L (ref 1–40)
BASOPHILS # BLD AUTO: 0.05 10*3/MM3 (ref 0–0.2)
BASOPHILS NFR BLD AUTO: 0.9 % (ref 0–1.5)
BILIRUB SERPL-MCNC: 0.3 MG/DL (ref 0–1.2)
BUN SERPL-MCNC: 14 MG/DL (ref 8–23)
BUN/CREAT SERPL: 13.6 (ref 7–25)
CALCIUM SPEC-SCNC: 9.3 MG/DL (ref 8.6–10.5)
CHLORIDE SERPL-SCNC: 101 MMOL/L (ref 98–107)
CO2 SERPL-SCNC: 33 MMOL/L (ref 22–29)
CREAT SERPL-MCNC: 1.03 MG/DL (ref 0.76–1.27)
DEPRECATED RDW RBC AUTO: 48.9 FL (ref 37–54)
EGFRCR SERPLBLD CKD-EPI 2021: 74.8 ML/MIN/1.73
EOSINOPHIL # BLD AUTO: 0.18 10*3/MM3 (ref 0–0.4)
EOSINOPHIL NFR BLD AUTO: 3.1 % (ref 0.3–6.2)
ERYTHROCYTE [DISTWIDTH] IN BLOOD BY AUTOMATED COUNT: 14.3 % (ref 12.3–15.4)
GLOBULIN UR ELPH-MCNC: 2.9 GM/DL
GLUCOSE SERPL-MCNC: 159 MG/DL (ref 65–99)
HCT VFR BLD AUTO: 47.4 % (ref 37.5–51)
HGB BLD-MCNC: 15.3 G/DL (ref 13–17.7)
IMM GRANULOCYTES # BLD AUTO: 0.01 10*3/MM3 (ref 0–0.05)
IMM GRANULOCYTES NFR BLD AUTO: 0.2 % (ref 0–0.5)
INR PPP: 1.68 (ref 0.89–1.12)
LYMPHOCYTES # BLD AUTO: 1.99 10*3/MM3 (ref 0.7–3.1)
LYMPHOCYTES NFR BLD AUTO: 34.7 % (ref 19.6–45.3)
MCH RBC QN AUTO: 30.1 PG (ref 26.6–33)
MCHC RBC AUTO-ENTMCNC: 32.3 G/DL (ref 31.5–35.7)
MCV RBC AUTO: 93.3 FL (ref 79–97)
MONOCYTES # BLD AUTO: 0.46 10*3/MM3 (ref 0.1–0.9)
MONOCYTES NFR BLD AUTO: 8 % (ref 5–12)
NEUTROPHILS NFR BLD AUTO: 3.05 10*3/MM3 (ref 1.7–7)
NEUTROPHILS NFR BLD AUTO: 53.1 % (ref 42.7–76)
NRBC BLD AUTO-RTO: 0 /100 WBC (ref 0–0.2)
PLATELET # BLD AUTO: 187 10*3/MM3 (ref 140–450)
PMV BLD AUTO: 9 FL (ref 6–12)
POTASSIUM SERPL-SCNC: 4.8 MMOL/L (ref 3.5–5.2)
PROT SERPL-MCNC: 6.8 G/DL (ref 6–8.5)
PROTHROMBIN TIME: 19.9 SECONDS (ref 12.2–14.5)
RBC # BLD AUTO: 5.08 10*6/MM3 (ref 4.14–5.8)
SODIUM SERPL-SCNC: 140 MMOL/L (ref 136–145)
WBC NRBC COR # BLD AUTO: 5.74 10*3/MM3 (ref 3.4–10.8)

## 2024-05-22 PROCEDURE — 93005 ELECTROCARDIOGRAM TRACING: CPT

## 2024-05-22 PROCEDURE — 80053 COMPREHEN METABOLIC PANEL: CPT

## 2024-05-22 PROCEDURE — 85610 PROTHROMBIN TIME: CPT

## 2024-05-22 PROCEDURE — 36415 COLL VENOUS BLD VENIPUNCTURE: CPT

## 2024-05-22 PROCEDURE — 85025 COMPLETE CBC W/AUTO DIFF WBC: CPT

## 2024-05-22 PROCEDURE — 85730 THROMBOPLASTIN TIME PARTIAL: CPT

## 2024-05-22 RX ORDER — PIOGLITAZONEHYDROCHLORIDE 15 MG/1
15 TABLET ORAL DAILY
COMMUNITY

## 2024-05-22 NOTE — TELEPHONE ENCOUNTER
FW: CARDIAC CLEARANCE.  Received: Today  Shannan Smith RN Rowe, [place], RegSched Rep         Previous Messages       ----- Message -----  From: Keira Langley APRN  Sent: 5/22/2024   1:46 PM EDT  To: Shannan Smith RN  Subject: RE: CARDIAC CLEARANCE.                          If no angina low CV risk.  May hold warfarin for 5 days.  ----- Message -----  From: Shannan Smith RN  Sent: 5/22/2024   1:31 PM EDT  To: PEGGY Jj  Subject: FW: CARDIAC CLEARANCE.                          It doesn't look like we manage the warfarin, but we did discuss changing at last visit, but he declined.  Mitchell this need to go to hospitals?  ----- Message -----  From: Ramon Holbrook RegSched Rep  Sent: 5/22/2024  11:47 AM EDT  To: Shannan Smith RN  Subject: CARDIAC CLEARANCE.                              Hello,    Mr Cal Frausto is scheduled for a bronchoscopy with Dr. Calderon on 5/31. Patient is on Warfarin, could Dr. Aguilera provide a cardiac clearance in his chart and approval to hold warfarin for 5 days prior to procedure? Thanks in advance for your assistant.    Thanks,    ALEX Holbrook  Patient Access Supervisor  [facility]

## 2024-05-22 NOTE — PAT
An arrival time for procedure was not provided during PAT visit. If patient had any questions or concerns about their arrival time, they were instructed to contact their surgeon/physician.  Additionally, if the patient referred to an arrival time that was acquired from their my chart account, patient was encouraged to verify that time with their surgeon/physician. Arrival times are NOT provided in Pre Admission Testing Department.    Per Anesthesia Request, patient instructed not to take their ACE/ARB medications on the AM of surgery.    Pt stated he was given instructions for holding blood thinners prior to procedure from Dr. Calderon's office, but pt couldn't remember what specific instructions were. Spoke with BJ at Dr. Calderon's office regarding blood thinner instructions. Per BJ, pt to hold ASA 81 mg 24 hrs prior to procedure, and warfarin 5 days prior to procedure. BJ to request a blood thinner statement from pt's prescribing provider.

## 2024-05-23 LAB
QT INTERVAL: 386 MS
QTC INTERVAL: 422 MS

## 2024-05-23 NOTE — PAT
Verified patient previously completed cardiology visit for cardiac risk assessment in preparation for upcoming procedure, completion of 12-lead ECG within six months, and risk assessment letter reviewed. No further interventions required.     Cleared by Keira WOODS as low CV risk, may hold warfarin for 5 days. Chart fowarded to endoscopy.     Called and informed patient via phone to hold coumadin for 5 days.  So last dose would be Saturday 5/25/24.  Patient verbalized understanding.

## 2024-05-31 ENCOUNTER — ANESTHESIA EVENT (OUTPATIENT)
Dept: GASTROENTEROLOGY | Facility: HOSPITAL | Age: 79
End: 2024-05-31
Payer: MEDICARE

## 2024-05-31 ENCOUNTER — HOSPITAL ENCOUNTER (OUTPATIENT)
Facility: HOSPITAL | Age: 79
Setting detail: HOSPITAL OUTPATIENT SURGERY
Discharge: HOME | End: 2024-05-31
Attending: INTERNAL MEDICINE | Admitting: INTERNAL MEDICINE
Payer: MEDICARE

## 2024-05-31 ENCOUNTER — ANESTHESIA (OUTPATIENT)
Dept: GASTROENTEROLOGY | Facility: HOSPITAL | Age: 79
End: 2024-05-31
Payer: MEDICARE

## 2024-05-31 VITALS
TEMPERATURE: 98.2 F | RESPIRATION RATE: 18 BRPM | HEART RATE: 76 BPM | SYSTOLIC BLOOD PRESSURE: 141 MMHG | OXYGEN SATURATION: 92 % | DIASTOLIC BLOOD PRESSURE: 79 MMHG

## 2024-05-31 DIAGNOSIS — R91.8 MULTIPLE PULMONARY NODULES: ICD-10-CM

## 2024-05-31 LAB — GLUCOSE BLDC GLUCOMTR-MCNC: 158 MG/DL (ref 70–130)

## 2024-05-31 PROCEDURE — 88305 TISSUE EXAM BY PATHOLOGIST: CPT | Performed by: INTERNAL MEDICINE

## 2024-05-31 PROCEDURE — 31625 BRONCHOSCOPY W/BIOPSY(S): CPT | Performed by: INTERNAL MEDICINE

## 2024-05-31 PROCEDURE — 87205 SMEAR GRAM STAIN: CPT | Performed by: INTERNAL MEDICINE

## 2024-05-31 PROCEDURE — 87116 MYCOBACTERIA CULTURE: CPT | Performed by: INTERNAL MEDICINE

## 2024-05-31 PROCEDURE — 87070 CULTURE OTHR SPECIMN AEROBIC: CPT | Performed by: INTERNAL MEDICINE

## 2024-05-31 PROCEDURE — 25010000002 ONDANSETRON PER 1 MG: Performed by: NURSE ANESTHETIST, CERTIFIED REGISTERED

## 2024-05-31 PROCEDURE — 31645 BRNCHSC W/THER ASPIR 1ST: CPT | Performed by: INTERNAL MEDICINE

## 2024-05-31 PROCEDURE — 82948 REAGENT STRIP/BLOOD GLUCOSE: CPT | Performed by: INTERNAL MEDICINE

## 2024-05-31 PROCEDURE — 88312 SPECIAL STAINS GROUP 1: CPT | Performed by: INTERNAL MEDICINE

## 2024-05-31 PROCEDURE — 25010000002 DEXAMETHASONE PER 1 MG: Performed by: NURSE ANESTHETIST, CERTIFIED REGISTERED

## 2024-05-31 PROCEDURE — 87206 SMEAR FLUORESCENT/ACID STAI: CPT | Performed by: INTERNAL MEDICINE

## 2024-05-31 PROCEDURE — 25810000003 SODIUM CHLORIDE 0.9 % SOLUTION: Performed by: ANESTHESIOLOGY

## 2024-05-31 PROCEDURE — 25810000003 SODIUM CHLORIDE 0.9 % SOLUTION: Performed by: NURSE ANESTHETIST, CERTIFIED REGISTERED

## 2024-05-31 PROCEDURE — 25010000002 PROPOFOL 10 MG/ML EMULSION: Performed by: NURSE ANESTHETIST, CERTIFIED REGISTERED

## 2024-05-31 PROCEDURE — 87102 FUNGUS ISOLATION CULTURE: CPT | Performed by: INTERNAL MEDICINE

## 2024-05-31 PROCEDURE — 31624 DX BRONCHOSCOPE/LAVAGE: CPT | Performed by: INTERNAL MEDICINE

## 2024-05-31 RX ORDER — ONDANSETRON 2 MG/ML
4 INJECTION INTRAMUSCULAR; INTRAVENOUS ONCE AS NEEDED
Status: DISCONTINUED | OUTPATIENT
Start: 2024-05-31 | End: 2024-05-31 | Stop reason: HOSPADM

## 2024-05-31 RX ORDER — LIDOCAINE HYDROCHLORIDE 10 MG/ML
INJECTION, SOLUTION EPIDURAL; INFILTRATION; INTRACAUDAL; PERINEURAL AS NEEDED
Status: DISCONTINUED | OUTPATIENT
Start: 2024-05-31 | End: 2024-05-31 | Stop reason: SURG

## 2024-05-31 RX ORDER — IPRATROPIUM BROMIDE AND ALBUTEROL SULFATE 2.5; .5 MG/3ML; MG/3ML
3 SOLUTION RESPIRATORY (INHALATION) ONCE AS NEEDED
Status: DISCONTINUED | OUTPATIENT
Start: 2024-05-31 | End: 2024-05-31 | Stop reason: HOSPADM

## 2024-05-31 RX ORDER — PROPOFOL 10 MG/ML
VIAL (ML) INTRAVENOUS AS NEEDED
Status: DISCONTINUED | OUTPATIENT
Start: 2024-05-31 | End: 2024-05-31 | Stop reason: SURG

## 2024-05-31 RX ORDER — DEXAMETHASONE SODIUM PHOSPHATE 4 MG/ML
INJECTION, SOLUTION INTRA-ARTICULAR; INTRALESIONAL; INTRAMUSCULAR; INTRAVENOUS; SOFT TISSUE AS NEEDED
Status: DISCONTINUED | OUTPATIENT
Start: 2024-05-31 | End: 2024-05-31 | Stop reason: SURG

## 2024-05-31 RX ORDER — SODIUM CHLORIDE 9 MG/ML
50 INJECTION, SOLUTION INTRAVENOUS CONTINUOUS
Status: DISCONTINUED | OUTPATIENT
Start: 2024-05-31 | End: 2024-05-31 | Stop reason: HOSPADM

## 2024-05-31 RX ORDER — SODIUM CHLORIDE 9 MG/ML
INJECTION, SOLUTION INTRAVENOUS CONTINUOUS PRN
Status: DISCONTINUED | OUTPATIENT
Start: 2024-05-31 | End: 2024-05-31 | Stop reason: SURG

## 2024-05-31 RX ORDER — LIDOCAINE HYDROCHLORIDE 40 MG/ML
4 INJECTION, SOLUTION RETROBULBAR; TOPICAL ONCE
Status: COMPLETED | OUTPATIENT
Start: 2024-05-31 | End: 2024-05-31

## 2024-05-31 RX ORDER — ONDANSETRON 2 MG/ML
INJECTION INTRAMUSCULAR; INTRAVENOUS AS NEEDED
Status: DISCONTINUED | OUTPATIENT
Start: 2024-05-31 | End: 2024-05-31 | Stop reason: SURG

## 2024-05-31 RX ADMIN — DEXAMETHASONE SODIUM PHOSPHATE 4 MG: 4 INJECTION, SOLUTION INTRAMUSCULAR; INTRAVENOUS at 09:31

## 2024-05-31 RX ADMIN — SODIUM CHLORIDE 50 ML/HR: 9 INJECTION, SOLUTION INTRAVENOUS at 08:36

## 2024-05-31 RX ADMIN — SODIUM CHLORIDE: 9 INJECTION, SOLUTION INTRAVENOUS at 09:23

## 2024-05-31 RX ADMIN — PROPOFOL 200 MG: 10 INJECTION, EMULSION INTRAVENOUS at 09:31

## 2024-05-31 RX ADMIN — LIDOCAINE HYDROCHLORIDE 50 MG: 10 INJECTION, SOLUTION EPIDURAL; INFILTRATION; INTRACAUDAL; PERINEURAL at 09:31

## 2024-05-31 RX ADMIN — LIDOCAINE HYDROCHLORIDE 4 ML: 40 INJECTION, SOLUTION RETROBULBAR; TOPICAL at 08:42

## 2024-05-31 RX ADMIN — ONDANSETRON 4 MG: 2 INJECTION INTRAMUSCULAR; INTRAVENOUS at 09:31

## 2024-05-31 NOTE — ANESTHESIA POSTPROCEDURE EVALUATION
Patient: Cal Frausto    Procedure Summary       Date: 05/31/24 Room / [facility]    Anesthesia Start: 0923 Anesthesia Stop: 0951    Procedure: BRONCHOSCOPY WITH BAL (Bronchus) Diagnosis:       Multiple pulmonary nodules      (Multiple pulmonary nodules [R91.8])    Surgeons: Hemal Calderon DO Provider: David Rincon MD    Anesthesia Type: general ASA Status: 3            Anesthesia Type: general    Vitals  Vitals Value Taken Time   /66 05/31/24 0948   Temp     Pulse 83 05/31/24 0950   Resp     SpO2 100 % 05/31/24 0950   Vitals shown include unfiled device data.        Post Anesthesia Care and Evaluation    Patient location during evaluation: PACU  Patient participation: complete - patient participated  Level of consciousness: awake and alert  Pain management: adequate    Airway patency: patent  Anesthetic complications: No anesthetic complications  PONV Status: none  Cardiovascular status: hemodynamically stable and acceptable  Respiratory status: nonlabored ventilation, acceptable and nasal cannula  Hydration status: acceptable

## 2024-05-31 NOTE — ANESTHESIA PROCEDURE NOTES
Airway  Urgency: elective    Date/Time: 5/31/2024 9:31 AM    General Information and Staff    Patient location during procedure: OR  CRNA/CAA: Emmy Bond CRNA    Indications and Patient Condition  Indications for airway management: airway protection    Preoxygenated: yes  Mask difficulty assessment: 0 - not attempted    Final Airway Details  Final airway type: supraglottic airway      Successful airway: I-gel  Size 4     Number of attempts at approach: 1  Assessment: lips, teeth, and gum same as pre-op and atraumatic intubation

## 2024-05-31 NOTE — ANESTHESIA PREPROCEDURE EVALUATION
Anesthesia Evaluation     Patient summary reviewed and Nursing notes reviewed   history of anesthetic complications:  PONV  NPO Solid Status: > 8 hours  NPO Liquid Status: > 2 hours           Airway   Mallampati: II  TM distance: >3 FB  Neck ROM: full  No difficulty expected  Dental    (+) edentulous    Pulmonary - normal exam    breath sounds clear to auscultation  (+) a smoker (1ppd) Current, cigarettes, COPD mild,sleep apnea (Mild, no CPAP)  Cardiovascular - normal exam    ECG reviewed  Rhythm: regular  Rate: normal    (+) hypertension, CAD, CABG (2004), cardiac stents (2019) , angina (Chronic - relieved with NTG, last used ~ 1 wk ago), DVT (AAA s/p endograft)      Neuro/Psych- negative ROS  GI/Hepatic/Renal/Endo    (+) renal disease (h/o renal ca s/p nephrectomy)-, diabetes mellitus, thyroid problem hypothyroidism    Musculoskeletal     Abdominal    Substance History      OB/GYN          Other   arthritis,                   Anesthesia Plan    ASA 3     general     intravenous induction     Anesthetic plan, risks, benefits, and alternatives have been provided, discussed and informed consent has been obtained with: patient.    Plan discussed with CRNA.    CODE STATUS:

## 2024-06-01 LAB — NIGHT BLUE STAIN TISS: NORMAL

## 2024-06-02 LAB
BACTERIA SPEC RESP CULT: NORMAL
GRAM STN SPEC: NORMAL
GRAM STN SPEC: NORMAL

## 2024-06-03 LAB
GIE STN SPEC: NORMAL
REF LAB TEST METHOD: NORMAL

## 2024-06-04 LAB
CYTO UR: NORMAL
LAB AP CASE REPORT: NORMAL
LAB AP CLINICAL INFORMATION: NORMAL
PATH REPORT.FINAL DX SPEC: NORMAL
PATH REPORT.GROSS SPEC: NORMAL

## 2024-06-07 LAB
FUNGUS WND CULT: NORMAL
MYCOBACTERIUM SPEC CULT: NORMAL
NIGHT BLUE STAIN TISS: NORMAL

## 2024-06-14 LAB
FUNGUS WND CULT: NORMAL
MYCOBACTERIUM SPEC CULT: NORMAL
NIGHT BLUE STAIN TISS: NORMAL

## 2024-06-21 LAB
FUNGUS WND CULT: NORMAL
MYCOBACTERIUM SPEC CULT: NORMAL
NIGHT BLUE STAIN TISS: NORMAL

## 2024-06-28 LAB
FUNGUS WND CULT: NORMAL
MYCOBACTERIUM SPEC CULT: NORMAL
NIGHT BLUE STAIN TISS: NORMAL

## 2024-07-05 LAB
FUNGUS WND CULT: NORMAL
MYCOBACTERIUM SPEC CULT: NORMAL
NIGHT BLUE STAIN TISS: NORMAL

## 2024-07-12 LAB
FUNGUS WND CULT: NORMAL
MYCOBACTERIUM SPEC CULT: NORMAL
NIGHT BLUE STAIN TISS: NORMAL

## 2024-08-14 ENCOUNTER — OFFICE VISIT (OUTPATIENT)
Dept: PULMONOLOGY | Facility: CLINIC | Age: 79
End: 2024-08-14
Payer: MEDICARE

## 2024-08-14 VITALS
HEIGHT: 64 IN | TEMPERATURE: 98.2 F | HEART RATE: 68 BPM | BODY MASS INDEX: 26.63 KG/M2 | DIASTOLIC BLOOD PRESSURE: 70 MMHG | SYSTOLIC BLOOD PRESSURE: 140 MMHG | OXYGEN SATURATION: 96 % | WEIGHT: 156 LBS

## 2024-08-14 DIAGNOSIS — J41.1 MUCOPURULENT CHRONIC BRONCHITIS: ICD-10-CM

## 2024-08-14 DIAGNOSIS — R91.8 MULTIPLE PULMONARY NODULES: Primary | ICD-10-CM

## 2024-08-14 PROCEDURE — 1159F MED LIST DOCD IN RCRD: CPT | Performed by: INTERNAL MEDICINE

## 2024-08-14 PROCEDURE — 1160F RVW MEDS BY RX/DR IN RCRD: CPT | Performed by: INTERNAL MEDICINE

## 2024-08-14 PROCEDURE — 3078F DIAST BP <80 MM HG: CPT | Performed by: INTERNAL MEDICINE

## 2024-08-14 PROCEDURE — 99214 OFFICE O/P EST MOD 30 MIN: CPT | Performed by: INTERNAL MEDICINE

## 2024-08-14 PROCEDURE — 3077F SYST BP >= 140 MM HG: CPT | Performed by: INTERNAL MEDICINE

## 2024-08-14 RX ORDER — GUAIFENESIN 600 MG/1
1200 TABLET, EXTENDED RELEASE ORAL 2 TIMES DAILY
Qty: 120 TABLET | Refills: 2 | Status: SHIPPED | OUTPATIENT
Start: 2024-08-14 | End: 2024-11-12

## 2024-08-14 RX ORDER — ALBUTEROL SULFATE 90 UG/1
2 AEROSOL, METERED RESPIRATORY (INHALATION) EVERY 4 HOURS PRN
Qty: 18 G | Refills: 11 | Status: SHIPPED | OUTPATIENT
Start: 2024-08-14

## 2024-08-14 RX ORDER — OXYCODONE HYDROCHLORIDE AND ACETAMINOPHEN 5; 325 MG/1; MG/1
1 TABLET ORAL EVERY 6 HOURS PRN
COMMUNITY
Start: 2024-06-04

## 2024-08-14 NOTE — PROGRESS NOTES
"Follow Up Office Note       Patient Name: Cal Frausto    Referring Physician: No ref. provider found    Chief Complaint:    Chief Complaint   Patient presents with    Multiple pulmonary nodules     Follow-up       History of Present Illness: Cal Frausto is a 78 y.o. male who is here today to follow-up care with Pulmonary.  Patient is a past medical history significant for hypertension, coronary artery disease, hyperlipidemia, diabetes mellitus type 2, hypothyroidism, COPD, and tobacco abuse.  Patient currently doing well no chest pain, nausea, fever, chills.  No breathing complaints.    Review of Systems:   Review of Systems   Constitutional:  Negative for chills, fatigue and fever.   HENT:  Negative for congestion and voice change.    Eyes:  Negative for blurred vision.   Respiratory:  Negative for cough, shortness of breath and wheezing.    Cardiovascular:  Negative for chest pain.   Skin:  Negative for dry skin.   Hematological:  Negative for adenopathy.   Psychiatric/Behavioral:  Negative for agitation and depressed mood.        The following portions of the patient's history were reviewed and updated as appropriate: allergies, current medications, past family history, past medical history, past social history, past surgical history and problem list.    Physical Exam:  Vital Signs:   Vitals:    08/14/24 1038   BP: 140/70   Pulse: 68   Temp: 98.2 °F (36.8 °C)   TempSrc: Temporal   SpO2: 96%   Weight: 70.8 kg (156 lb)   Height: 161.3 cm (63.5\")       Physical Exam  Vitals and nursing note reviewed.   Constitutional:       General: He is not in acute distress.     Appearance: Normal appearance. He is well-developed. He is not ill-appearing.   HENT:      Head: Normocephalic and atraumatic.      Right Ear: External ear normal.      Left Ear: External ear normal.      Nose: Nose normal.      Mouth/Throat:      Mouth: Mucous membranes are moist.      Pharynx: Oropharynx is clear.   Eyes:      Conjunctiva/sclera: " Conjunctivae normal.      Pupils: Pupils are equal, round, and reactive to light.   Cardiovascular:      Rate and Rhythm: Normal rate and regular rhythm.      Pulses: Normal pulses.      Heart sounds: No murmur heard.     No friction rub. No gallop.   Pulmonary:      Effort: Pulmonary effort is normal. No respiratory distress.      Breath sounds: Normal breath sounds. No wheezing, rhonchi or rales.   Abdominal:      General: Bowel sounds are normal. There is no distension.      Palpations: Abdomen is soft.      Tenderness: There is no abdominal tenderness.   Musculoskeletal:         General: Normal range of motion.      Cervical back: Normal range of motion and neck supple.      Right lower leg: No edema.      Left lower leg: No edema.   Skin:     General: Skin is warm and dry.   Neurological:      General: No focal deficit present.      Mental Status: He is alert and oriented to person, place, and time.   Psychiatric:         Mood and Affect: Mood normal.         Behavior: Behavior normal.         Thought Content: Thought content normal.         Judgment: Judgment normal.         Immunization History   Administered Date(s) Administered    COVID-19 (MODERNA) 1st,2nd,3rd Dose Monovalent 11/16/2021    Fluzone (or Fluarix & Flulaval for VFC) >6mos 09/28/2016, 11/01/2019, 09/23/2020    Fluzone High-Dose 65+YRS 10/05/2017, 10/09/2018, 10/04/2023       Results Review:   - CT scan of the chest from April 2024, shows tree-in-bud opacities throughout the right middle and right lower lobe, and it appears to have significant amount of debris in the bronchus intermedius extending down to the right lower lobe  - PFT from 5/7/2024 shows moderate obstruction without restriction and a normal DLCO.  -I personally viewed the patient's culture results from 5/31/2024, respiratory culture, fungus culture and AFB culture all negative  -Personally viewed the patient's pathology and cytology results from May 2021 notable for chronic  inflammation from transbronchial biopsies in the right lower lobe no signs of malignancy.  Cytology negative as well    Assessment / Plan:   Diagnoses and all orders for this visit:    1. Multiple pulmonary nodules (Primary)  -Plan for repeat CT scan in November 2024, cultures all negative.  Will work on airway clearance as noted below.    2. Mucopurulent chronic bronchitis  -Patient has gold stage 3 class B COPD  -Start albuterol every 4 hours as needed for medication management  -latest PFTs as noted above  -Patient counseled on monitoring for COPD exacerbation and treatment plan  -Recommend 30 minutes cardiovascular size per day  -Use albuterol up to 6 times a day as needed, immediately before flutter valve  -Use flutter valve twice daily 10 blows per session to help with mucus clearance  -Mucinex 1200 mg twice daily      Follow Up:   Return in about 6 months (around 2/14/2025) for CT Chest with next visit.       MALORIE Calderon, DO  Pulmonary and Critical Care Medicine  Note Electronically Signed    Part of this note may be an electronic transcription/translation of spoken language to printed text using the Dragon Dictation System.

## 2024-08-15 DIAGNOSIS — J41.1 MUCOPURULENT CHRONIC BRONCHITIS: Primary | ICD-10-CM

## 2024-12-05 DIAGNOSIS — Z98.890 S/P AAA REPAIR: Primary | ICD-10-CM

## 2024-12-05 DIAGNOSIS — Z86.79 S/P AAA REPAIR: Primary | ICD-10-CM

## 2025-01-13 NOTE — PROGRESS NOTES
[facility]  Subjective:     Encounter Date: 01/06/2025      Patient ID: Cal Frausto is a 79 y.o. male.    Chief Complaint: Coronary artery disease involving coronary bypass graft of       PROBLEM LIST:  Coronary artery disease  [facility] for NSTEMI, April 1989: Occluded LAD. RCA and LCx within normal limits.   [facility], 03/2004, for abnormal Carmultivessel disease.  CABG x3, 04/19/2004, Dr. Cardona: LIMA to LAD, SVG to the ramus and OM1, SVG to PDA.   [facility], 11/2004: Occluded SVG to the RCA. 80% LM with an occluded LAD with a patent LIMA graft to the LAD. Occluded OM1 with a patent SVG to the OM.    [facility], 07/30/2013: 100% RCA. Occluded SVG. Patent LIMA with distal LAD occlusion. Widely patent SVG to ramus and OM-1, filling other natives of the collaterals with normal LVEF.  [facility], 05/04/2018: 90% proximal LCx supplying the large left posterior lateral and collaterals to the RCA/right PDA. Successful treatment with 3.0 x 12 Xience EES. Widely patent LIMA however distal LAD occluded. Patent SVG to the ramus branch. 30% disease distal. EF 50%.  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 Acute LLE superficial thrombophlebitis noted in the greater saphenous (above knee) and varicosity (below knee). Chronic LLE superficial thrombophlebitis noted in the greater saphenous (below knee).  Hypertension.  Dyslipidemia.  AAA  CTA Abdomen, 10/16/2017: 5.0 cm infrarenal AAA, followed by Dr. Isabel  09/17/2020 endovascular repair of abdominal aortic aneurysm, Dr. Edwards  CT A/P 09/30/2021: AAA measures 5.5 x 4.8 cm, previously was 6.2 x 5.6 cm.   Type 2 diabetes.   Ongoing tobacco abuse.  Renal cancer 2019  Status post left nephrectomy  History of alcohol abuse, none since 1977.  COPD with nocturnal oxygen use.  Arthritis.  Hypothyroidism.  History of childhood seizures.  Left wrist cyst removal x2.  Left carpal tunnel surgery.  Bell’s palsy  in 2012.      History of Present Illness  Cal A High returns today for a follow up with a history of CAD and cardiac risk factors. Since last visit, patient has been doing well overall from a cardiovascular standpoint. He reports some chest pain, but are not as severe since the stents were placed. Patient mentions some balance issues making it hard to stay busy and active. Patient denies shortness of breath, orthopnea, palpitations, edema, dizziness, and syncope.      Allergies   Allergen Reactions    Metformin Diarrhea         Current Outpatient Medications:     acetaminophen (TYLENOL) 500 MG tablet, Take 1 tablet by mouth Every 6 (Six) Hours As Needed for Mild Pain., Disp: , Rfl:     albuterol sulfate  (90 Base) MCG/ACT inhaler, Inhale 2 puffs Every 4 (Four) Hours As Needed for Wheezing., Disp: 18 g, Rfl: 11    aspirin 81 MG tablet, Take 1 tablet by mouth Daily., Disp: , Rfl:     atenolol (TENORMIN) 50 MG tablet, Take 1 tablet by mouth Daily., Disp: , Rfl:     dapagliflozin (Farxiga) 5 MG tablet tablet, Take 1 tablet by mouth Daily., Disp: , Rfl:     fenofibrate 160 MG tablet, Take 1 tablet by mouth Daily., Disp: , Rfl:     isosorbide mononitrate (IMDUR) 60 MG 24 hr tablet, Take 1 tablet by mouth Daily., Disp: , Rfl:     levothyroxine (SYNTHROID, LEVOTHROID) 112 MCG tablet, Take 1 tablet by mouth Daily., Disp: , Rfl:     losartan-hydrochlorothiazide (HYZAAR) 100-25 MG per tablet, Take 0.5 tablets by mouth Daily., Disp: , Rfl:     nitroglycerin (NITROSTAT) 0.4 MG SL tablet, 1 under the tongue as needed for angina, may repeat q5mins for up three doses (Patient taking differently: Place 1 tablet under the tongue Every 5 (Five) Minutes As Needed. 1 under the tongue as needed for angina, may repeat q5mins for up three doses), Disp: 25 tablet, Rfl: 3    oxyCODONE-acetaminophen (PERCOCET) 5-325 MG per tablet, Take 1 tablet by mouth Every 6 (Six) Hours As Needed. for pain, Disp: , Rfl:     pioglitazone (ACTOS)  "15 MG tablet, Take 1 tablet by mouth Daily., Disp: , Rfl:     rosuvastatin (CRESTOR) 40 MG tablet, Take 1 tablet by mouth Every Night., Disp: 90 tablet, Rfl: 3    SITagliptin (JANUVIA) 100 MG tablet, Take 1 tablet by mouth Daily., Disp: , Rfl:     thyroid (ARMOUR) 15 MG tablet, Take 1 tablet by mouth Daily., Disp: , Rfl:     warfarin (COUMADIN) 5 MG tablet, Take 1 tablet by mouth Daily. Currently 3mg, Disp: , Rfl:     finasteride (PROSCAR) 5 MG tablet, Take 1 tablet by mouth Daily. (Patient not taking: Reported on 1/20/2025), Disp: , Rfl:     The following portions of the patient's history were reviewed and updated as appropriate: allergies, current medications, past family history, past medical history, past social history, past surgical history and problem list.    ROS       Objective:     Vitals:    01/20/25 1106   BP: 128/84   BP Location: Right arm   Patient Position: Sitting   Cuff Size: Adult   Pulse: (!) 46   SpO2: 98%   Weight: 75.5 kg (166 lb 6.4 oz)   Height: 167.6 cm (66\")         Vitals reviewed.   Constitutional:       Appearance: Well-developed and not in distress.   Neck:      Thyroid: No thyromegaly.      Vascular: No carotid bruit or JVD.   Pulmonary:      Breath sounds: Normal breath sounds.   Chest:      Comments: Kyphoscoliosis  Cardiovascular:      Regular rhythm.      No gallop. No S3 and S4 gallop.   Pulses:     Intact distal pulses.      Carotid: 2+ bilaterally.     Radial: 2+ bilaterally.  Edema:     Peripheral edema absent.   Abdominal:      General: Bowel sounds are normal.      Palpations: Abdomen is soft. There is no abdominal mass.      Tenderness: There is no abdominal tenderness.   Musculoskeletal:         General: No deformity.      Extremities: No clubbing present.Skin:     General: Skin is warm and dry.      Findings: No rash.   Neurological:      Mental Status: Alert and oriented to person, place, and time.         Lab Review:  Lab Results   Component Value Date    GLUCOSE 159 " (H) 05/22/2024    BUN 14 05/22/2024    CREATININE 1.03 05/22/2024    BCR 13.6 05/22/2024    K 4.8 05/22/2024    CO2 33.0 (H) 05/22/2024    CALCIUM 9.3 05/22/2024    ALBUMIN 3.9 05/22/2024    ALKPHOS 76 05/22/2024    AST 22 05/22/2024    ALT 26 05/22/2024     Lab Results   Component Value Date    WBC 5.74 05/22/2024    RBC 5.08 05/22/2024    HGB 15.3 05/22/2024    HCT 47.4 05/22/2024    MCV 93.3 05/22/2024     05/22/2024     Lab date: 04/29/2024  FLP: , , HDL 31      Procedures      Advance Care Planning   ACP discussion was held with the patient during this visit. Patient does not have an advance directive, declines further assistance.           Assessment:   Diagnoses and all orders for this visit:    1. Coronary artery disease involving coronary bypass graft of native heart with angina pectoris (Primary)    2. Essential hypertension    3. Dyslipidemia          Impression:  1. Coronary artery disease.  Status post CABG 19 years ago.  Known graft disease.  Stable class II angina. Continue on aspirin 81 mg for antiplatelet therapy. Continue on Imdur 60 mg daily for chest pain.     2. Essential hypertension. Controlled. Decrease atenolol to 25 mg daily due to low heart rate. Continue on Hyzaar 100-25 mg daily for hypertension.     3. Dyslipidemia. Controlled. Continue statin use and fenofibrate 160 mg daily for dyslipidemia.     4.  Tobacco use    5.  AAA, status post EVAR.  Followed by CT SDr. Edwards.    6.  Bradycardia.  Asymptomatic.    Plan:  Stable cardiac status.   Decrease atenolol to 25 mg daily due to low heart rate.   No current indication for permanent pacemaker due to asymptomatic bradycardia.  Patient instructed to notify us if he develops dizziness or lightheadedness.  Continue other current medications.  Revisit in 12 MO, or sooner as needed.        Scribed for Keegan Aguilera MD by Fer Ruiz. 1/20/2025  11:36 EST  Keegan Aguilera MD      Please note that portions of this note  may have been completed with a voice recognition program. Efforts were made to edit the dictations, but occasionally words are mistranscribed.

## 2025-01-20 ENCOUNTER — OFFICE VISIT (OUTPATIENT)
Dept: CARDIOLOGY | Facility: CLINIC | Age: 80
End: 2025-01-20
Payer: MEDICARE

## 2025-01-20 VITALS
DIASTOLIC BLOOD PRESSURE: 84 MMHG | HEART RATE: 46 BPM | HEIGHT: 66 IN | BODY MASS INDEX: 26.74 KG/M2 | OXYGEN SATURATION: 98 % | WEIGHT: 166.4 LBS | SYSTOLIC BLOOD PRESSURE: 128 MMHG

## 2025-01-20 DIAGNOSIS — I25.709 CORONARY ARTERY DISEASE INVOLVING CORONARY BYPASS GRAFT OF NATIVE HEART WITH ANGINA PECTORIS: Primary | ICD-10-CM

## 2025-01-20 DIAGNOSIS — E78.5 DYSLIPIDEMIA: ICD-10-CM

## 2025-01-20 DIAGNOSIS — I10 ESSENTIAL HYPERTENSION: ICD-10-CM

## 2025-01-20 PROCEDURE — 3074F SYST BP LT 130 MM HG: CPT | Performed by: INTERNAL MEDICINE

## 2025-01-20 PROCEDURE — 1159F MED LIST DOCD IN RCRD: CPT | Performed by: INTERNAL MEDICINE

## 2025-01-20 PROCEDURE — 1160F RVW MEDS BY RX/DR IN RCRD: CPT | Performed by: INTERNAL MEDICINE

## 2025-01-20 PROCEDURE — 99214 OFFICE O/P EST MOD 30 MIN: CPT | Performed by: INTERNAL MEDICINE

## 2025-01-20 PROCEDURE — 3079F DIAST BP 80-89 MM HG: CPT | Performed by: INTERNAL MEDICINE

## 2025-01-20 RX ORDER — ATENOLOL 25 MG/1
25 TABLET ORAL DAILY
Qty: 30 TABLET | Refills: 11 | Status: SHIPPED | OUTPATIENT
Start: 2025-01-20

## 2025-01-20 RX ORDER — ATENOLOL 50 MG/1
25 TABLET ORAL DAILY
Qty: 90 TABLET | Refills: 1 | Status: CANCELLED | OUTPATIENT
Start: 2025-01-20

## 2025-03-03 ENCOUNTER — HOSPITAL ENCOUNTER (OUTPATIENT)
Dept: ULTRASOUND IMAGING | Facility: HOSPITAL | Age: 80
Discharge: HOME | End: 2025-03-03
Payer: MEDICARE

## 2025-03-03 DIAGNOSIS — Z86.79 S/P AAA REPAIR: ICD-10-CM

## 2025-03-03 DIAGNOSIS — Z98.890 S/P AAA REPAIR: ICD-10-CM

## 2025-03-03 PROCEDURE — 76775 US EXAM ABDO BACK WALL LIM: CPT

## 2025-03-07 NOTE — PROGRESS NOTES
[facility] Office Follow Up Note     Date of Encounter: 2025     Name: Cal Frausto  : 1945     Referred By: No ref. provider found  PCP: Ayesha Pierce MD    Chief Complaint:    Chief Complaint   Patient presents with    Aortic Aneurysm     1 year follow up with AAA US       Subjective      History of Present Illness:    Cal Frausto is a 79 y.o. male s/p EVAR AAA with Dr. Edwards on 2020. PMH: current smoker, HTN, HLD, DVT, renal cell cancer s/p nephrectomy (2019), CAD s/p stenting and CABG, type 2 DM, and AAA. Patient was last seen in office 2024 with stable native aneurysmal sac without evidence of endoleak. He presents today for annual surveillance imaging. He denies unusual abdominal, back, or groin pain. Reports better blood pressure control, managed by Nephrology as well as Cardiology, Dr. Aguilera and typically runs 130s systolic. He reports he continues to smoke.     Review of Systems:  Review of Systems   Constitutional: Negative for chills, decreased appetite, diaphoresis, fever, malaise/fatigue, night sweats, weight gain and weight loss.   HENT:  Negative for hoarse voice.    Eyes:  Negative for blurred vision, double vision and visual disturbance.   Cardiovascular:  Positive for chest pain (about a week ago and resolved on its own). Negative for claudication, cyanosis, dyspnea on exertion, irregular heartbeat, leg swelling, near-syncope, orthopnea, palpitations, paroxysmal nocturnal dyspnea and syncope.   Respiratory:  Negative for cough, hemoptysis, shortness of breath, sputum production and wheezing.    Hematologic/Lymphatic: Negative for adenopathy and bleeding problem. Does not bruise/bleed easily.   Skin:  Negative for color change, nail changes, poor wound healing and rash.   Musculoskeletal:  Negative for back pain, falls and muscle cramps.   Gastrointestinal:  Negative for abdominal pain, dysphagia and heartburn.   Genitourinary:   Negative for flank pain.   Neurological:  Negative for brief paralysis, disturbances in coordination, dizziness, focal weakness, headaches, light-headedness, loss of balance, numbness, paresthesias, sensory change, vertigo and weakness.   Psychiatric/Behavioral:  Negative for depression and suicidal ideas.    Allergic/Immunologic: Negative for persistent infections.       I have reviewed the following portions of the patient's history: problem list, current medications, allergies, past surgical history, past medical history, past social history, past family history, and ROS and confirm it's accurate.    Allergies:  Allergies   Allergen Reactions    Metformin Diarrhea       Medications:      Current Outpatient Medications:     acetaminophen (TYLENOL) 500 MG tablet, Take 1 tablet by mouth Every 6 (Six) Hours As Needed for Mild Pain., Disp: , Rfl:     albuterol sulfate  (90 Base) MCG/ACT inhaler, Inhale 2 puffs Every 4 (Four) Hours As Needed for Wheezing., Disp: 18 g, Rfl: 11    aspirin 81 MG tablet, Take 1 tablet by mouth Daily., Disp: , Rfl:     atenolol (TENORMIN) 25 MG tablet, Take 1 tablet by mouth Daily., Disp: 30 tablet, Rfl: 11    dapagliflozin (Farxiga) 5 MG tablet tablet, Take 1 tablet by mouth Daily., Disp: , Rfl:     fenofibrate 160 MG tablet, Take 1 tablet by mouth Daily., Disp: , Rfl:     isosorbide mononitrate (IMDUR) 60 MG 24 hr tablet, Take 1 tablet by mouth Daily., Disp: , Rfl:     levothyroxine (SYNTHROID, LEVOTHROID) 112 MCG tablet, Take 1 tablet by mouth Daily., Disp: , Rfl:     losartan-hydrochlorothiazide (HYZAAR) 100-25 MG per tablet, Take 0.5 tablets by mouth Daily., Disp: , Rfl:     nitroglycerin (NITROSTAT) 0.4 MG SL tablet, 1 under the tongue as needed for angina, may repeat q5mins for up three doses (Patient taking differently: Place 1 tablet under the tongue Every 5 (Five) Minutes As Needed. 1 under the tongue as needed for angina, may repeat q5mins for up three doses), Disp: 25  tablet, Rfl: 3    oxyCODONE-acetaminophen (PERCOCET) 5-325 MG per tablet, Take 1 tablet by mouth Every 6 (Six) Hours As Needed. for pain, Disp: , Rfl:     pioglitazone (ACTOS) 15 MG tablet, Take 1 tablet by mouth Daily., Disp: , Rfl:     rosuvastatin (CRESTOR) 40 MG tablet, Take 1 tablet by mouth Every Night., Disp: 90 tablet, Rfl: 3    SITagliptin (JANUVIA) 100 MG tablet, Take 1 tablet by mouth Daily., Disp: , Rfl:     thyroid (ARMOUR) 15 MG tablet, Take 1 tablet by mouth Daily., Disp: , Rfl:     warfarin (COUMADIN) 5 MG tablet, Take 1 tablet by mouth Daily. 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    History of seizures as a child     History of transfusion     2022, [facility], no reaction    Hypertension     Hypothyroidism     MI (myocardial infarction)     x5 per pt; most recent 15-20 yrs ago    Pneumonia     PONV (postoperative nausea and vomiting)     Seizures     History of childhood seizures.    Tobacco abuse     Ongoing    Type 2 diabetes mellitus        Past Surgical History:   Procedure Laterality Date    ABDOMINAL AORTIC ANEURYSM REPAIR WITH ENDOGRAFT N/A 09/17/2020    Procedure: ABDOMINAL AORTIC ANEURYSM REPAIR WITH ENDOGRAFT;  Surgeon: Flynn Edwards MD;  Location: [facility] OR ;  Service: Cardiothoracic;  Laterality: N/A;  FLUORO - 13  MINS 12 SECS  DOSE - 716mGy  CONTRAST - 70 ml isovue 300    BRONCHOSCOPY N/A 5/31/2024    Procedure: BRONCHOSCOPY WITH BRONCHOALVEOLAR LAVAGE;  Surgeon: Hemal Calderon DO;  Location:  [facility];  Service: Pulmonary;  Laterality: N/A;    CARDIAC CATHETERIZATION  11/2004    revealed an occluded saphenous vein graft to the RCA. There was an 80% left main with an occluded LAD with a patent LIMA graft to the LAD. There was also an occluded OM1 with a patent saphenous vein graft to the OM.    CARDIAC CATHETERIZATION  07/30/2013    with 100% RCA. Occluded SVG. With 100% LAD. Patent LINDSEY with distal LAD occlusion. Widely patent SVG to ramus and OM-1, filling other natives of the collaterals with normal LVEF.    CARDIAC CATHETERIZATION N/A 05/04/2018    Procedure: Left Heart Cath;  Surgeon: Keegan Aguilera MD;  Location:  [facility];  Service: Cardiovascular    CARDIAC CATHETERIZATION      1987, 2003, 2004, 2013, 2018    CARPAL TUNNEL RELEASE Left 2003    COLONOSCOPY  2018    COLONOSCOPY N/A 12/06/2021    Procedure: COLONOSCOPY;  Surgeon: Brunner, Mark I, MD;  Location:  [facility];  Service: Gastroenterology;  Laterality: N/A;    CORONARY ANGIOPLASTY WITH STENT PLACEMENT      CORONARY ARTERY BYPASS GRAFT  2004    x3    CYST REMOVAL Left     wrist x2; 2003, 2004    ENDOSCOPY N/A 12/05/2021    Procedure: ESOPHAGOGASTRODUODENOSCOPY;  Surgeon: Shane Kelly MD;  Location:  [facility];  Service: Gastroenterology;  Laterality: N/A;    KIDNEY SURGERY Left 2018    left kidney and tube removed    POLYPECTOMY         Social History     Socioeconomic History    Marital status:     Number of children: 3   Tobacco Use    Smoking status: Every Day     Current packs/day: 1.00     Average packs/day: 1 pack/day for 54.0 years (54.0 ttl pk-yrs)     Types: Cigarettes     Start date: 3/11/1971    Smokeless tobacco: Never    Tobacco comments:     0.5 ppd as of 3/11/25   Vaping Use     Vaping status: Never Used   Substance and Sexual Activity    Alcohol use: Not Currently     Comment: Alcohol abuse, quit 1977    Drug use: Never    Sexual activity: Defer        Family History   Problem Relation Age of Onset    No Known Problems Sister        Objective     Imaging/Labs:    US Aorta Limited (03/03/2025 11:44)   Result Date: 3/3/2025  Findings:  There is mild aneurysmal dilation of the suprarenal abdominal aorta measuring 3.3 cm. The infrarenal abdominal aorta measures 3.7 proximally and 5.4 cm more distally. The right iliac measures 2.2 cm. The left iliac measures 1.9 cm. The patient is status   post endograft repair. No definite findings of endoleak is identified, however the aneurysm sac has enlarged compared to prior CT scan.    Impression: Interval enlargement of the infrarenal abdominal aortic aneurysm status post endograft repair. No definite evidence of endoleak, however the aneurysm sac has enlarged compared to prior CT scan. Consider CTA for further evaluation.   Electronically Signed: Elio Huddleston MD  3/3/2025 12:05 PM EST        CT Abdomen Pelvis Without Contrast (12/28/2023 14:49) (personally reviewed and measured right iliac at 1.7 cm and left iliac at 1.6 cm)  Findings:  The irregular shaped native aneurysm has maximal AP and transverse diameters of 5.5 and 4.6 cm respectively, unchanged from the prior study. No surrounding inflammation is identified. Maximal transverse luminal diameter on sagittal imaging sequences is   stable at 5.6 cm.  Impression:  1. Stable size and appearance of the patient's previously stented abdominal aortic aneurysm.  2. Stable fat-only containing nonstrangulated ventral midline hernias.  3. 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  Electronically Signed: Leroy Roy MD  12/29/2023    CT Abdomen Pelvis Without Contrast (11/03/2022 13:38)   IMPRESSION:  Stable abdominal  aortic aneurysm size measuring up to 6 cm. Aortobiiliac  stent graft is present. Fat-containing anterior abdominal wall hernias unchanged.     This report was finalized on 11/3/2022 3:57 PM by Freya Reyna MD.\\    CT Abdomen Pelvis Without Contrast (09/30/2021 14:54)   IMPRESSION:  Successful endovascular stent repair of the intrarenal  abdominal aorta with overall decrease seen in size of the native  abdominal aortic aneurysm in the interval. No CT evidence of acute  intra-abdominal or pelvic abnormality. Diverticulosis with no evidence  of diverticulitis.    This report was finalized on 10/4/2021 9:03 AM by Dr. Kerri Arzate MD.    CT Abdomen Pelvis With & Without Contrast (10/30/2020 11:30)   IMPRESSION:  Successful endovascular stent repair of the infrarenal  abdominal aortic aneurysm with no change seen in size of the native  abdominal aorta. No extravasation of contrast to suggest evidence of a  leak. The remainder of the CT abdomen and pelvis is stable and grossly  unremarkable.    This report was finalized on 11/2/2020 8:56 AM by Dr. Kerri Arzate MD.    CT Abdomen Pelvis Without Contrast (08/06/2020 11:37)   IMPRESSION:  1.Soft tissue body wall findings demonstrates small ventral abdominal  wall hernia with contained single bowel wall Collins-type hernia without  surrounding inflammation or focal fluid collection. No evidence for  mechanical obstructive process.  2. Enlarging aneurysmal infrarenal abdominal aorta from 2017 exam  comparison now measuring up to 6.4 cm previously 5.4 cm on that exam  with CTA and/or vascular surgery consultation recommended for further  evaluation.     This report was finalized on 8/7/2020 7:14 PM by Dr. Antoine Moore.    CT angiogram abdomen pelvis w wo contrast (10/16/2017 11:13)   IMPRESSION:  Infrarenal abdominal aortic aneurysm largest AP dimension  4.9 cm. No CT evidence of acute intra-abdominal or pelvic abnormality.     This report was finalized on 10/18/2017  "10:11 AM by Dr. Kerri Arzate MD.    CT angiogram abdomen pelvis w wo contrast (06/24/2016 11:09)   Impression   Infrarenal abdominal aortic aneurysm measuring approximately  4.7 x 4.1 x 8.9 cm; not significantly changed in size since 05/17/2016.     This report was finalized on 6/27/2016 1:20 PM by Dr. Deng Dumas MD.          Physical Exam:  Vitals:    03/11/25 0848   BP: 132/68   BP Location: Left arm   Patient Position: Sitting   Pulse: 52   Temp: 97.1 °F (36.2 °C)   SpO2: 98%   Weight: 76.3 kg (168 lb 3.2 oz)   Height: 167.6 cm (66\")      Body mass index is 27.15 kg/m².  BMI is >= 25 and <30. (Overweight) The following options were offered after discussion;: information on healthy weight added to patient's after visit summary        Physical Exam  Vitals and nursing note reviewed.   Constitutional:       General: He is not in acute distress.  HENT:      Head: Normocephalic and atraumatic.   Neck:      Vascular: No carotid bruit or JVD.   Cardiovascular:      Rate and Rhythm: Normal rate and regular rhythm.      Pulses:           Radial pulses are 2+ on the right side and 2+ on the left side.        Dorsalis pedis pulses are 2+ on the right side and 2+ on the left side.        Posterior tibial pulses are 2+ on the right side and 2+ on the left side.      Heart sounds: Normal heart sounds. No murmur heard.  Pulmonary:      Effort: Pulmonary effort is normal.      Breath sounds: Normal breath sounds.   Abdominal:      General: There is no abdominal bruit.      Palpations: There is no pulsatile mass.   Musculoskeletal:      Right lower leg: No edema.      Left lower leg: No edema.   Skin:     General: Skin is warm.      Capillary Refill: Capillary refill takes less than 2 seconds.   Neurological:      Mental Status: He is alert.      Gait: Gait is intact.   Psychiatric:         Attention and Perception: Attention normal.         Behavior: Behavior is cooperative.         Assessment / Plan    Cal A High is " a 79 y.o. male s/p EVAR AAA with Dr. Edwards on 9/17/2020. PMH: current smoker, HTN, HLD, DVT, renal cell cancer s/p nephrectomy (2019), CAD s/p stenting and CABG, type 2 DM, and AAA. Patient was last seen in office 1/2024 with stable native aneurysmal sac without evidence of endoleak. He presents today for annual surveillance imaging.     Assessment / Plan:  1. S/P AAA endograft repair 9/17/2020   Denies unusual abdominal, back, or groin pain  Reports better BP control, managed by nephrology and Cardiology, Dr. Aguilera; typically runs 130s systolic  Continues to smoke, discussed cessation  US imaging demonstrates maximum diameter of aneurysm sac at 5.4 cm in infrarenal AAA and 2.2 cm right iliac and 1.9 left iliac. Last CT imaging 15 months prior with personally measured 1.7 cm right iliac and 1.6 left iliac. With growth of 0.5 cm over 15 months in right iliac native aneurysm, will repeat imaging in 1 year. If growth is again noted or native sac reaches interventional size, will order CT imaging; however, patient only has one kidney so will need to avoid contrast.  Follow up in 1 year with US AAA      Patient Education: Avoid tobacco use. Continue to maintain strict BP control with goal <130/80 mmHg.       Follow Up:   Return in about 1 year (around 3/11/2026) for US AAA.   Or sooner for any further concerns or worsening sign and symptoms. If unable to reach us in the office please dial 911 or go to the nearest emergency department.      PEGGY Rodriguez  [facility]    Time Spent: I spent 29 minutes caring for Cal on this date of service. This time includes time spent by me in the following activities: preparing for the visit, reviewing tests, obtaining and/or reviewing a separately obtained history, performing a medically appropriate examination and/or evaluation, counseling and educating the patient/family/caregiver, ordering medications, tests, or procedures, documenting  information in the medical record, independently interpreting results and communicating that information with the patient/family/caregiver, and care coordination.

## 2025-03-11 ENCOUNTER — OFFICE VISIT (OUTPATIENT)
Dept: CARDIAC SURGERY | Facility: CLINIC | Age: 80
End: 2025-03-11
Payer: MEDICARE

## 2025-03-11 VITALS
HEIGHT: 66 IN | HEART RATE: 52 BPM | WEIGHT: 168.2 LBS | SYSTOLIC BLOOD PRESSURE: 132 MMHG | TEMPERATURE: 97.1 F | BODY MASS INDEX: 27.03 KG/M2 | OXYGEN SATURATION: 98 % | DIASTOLIC BLOOD PRESSURE: 68 MMHG

## 2025-03-11 DIAGNOSIS — Z98.890 S/P AAA REPAIR: Primary | ICD-10-CM

## 2025-03-11 DIAGNOSIS — Z86.79 S/P AAA REPAIR: Primary | ICD-10-CM

## 2025-08-21 ENCOUNTER — HOSPITAL ENCOUNTER (OUTPATIENT)
Dept: CT IMAGING | Facility: HOSPITAL | Age: 80
Discharge: HOME | End: 2025-08-21
Admitting: INTERNAL MEDICINE
Payer: MEDICARE

## 2025-08-21 DIAGNOSIS — R91.8 MULTIPLE PULMONARY NODULES: ICD-10-CM

## 2025-08-21 PROCEDURE — 71250 CT THORAX DX C-: CPT

## (undated) DEVICE — THE SINGLE USE ETRAP – POLYP TRAP IS USED FOR SUCTION RETRIEVAL OF ENDOSCOPICALLY REMOVED POLYPS.: Brand: ETRAP

## (undated) DEVICE — SUCTION CANISTER, 1000CC,SAFELINER: Brand: DEROYAL

## (undated) DEVICE — SNAP KOVER: Brand: UNBRANDED

## (undated) DEVICE — SYR LUERLOK 30CC

## (undated) DEVICE — SUCTION CANISTER, 2500CC, RIGID: Brand: DEROYAL

## (undated) DEVICE — CATHETER,URETHRAL,REDRUBBER,STRL,18FR: Brand: MEDLINE

## (undated) DEVICE — SUT MNCRYL PLS ANTIB UD 4/0 PS2 18IN

## (undated) DEVICE — SYR SLP TP 10ML DISP

## (undated) DEVICE — SYR SLPTP 20CC

## (undated) DEVICE — SYR LUERLOK 5CC

## (undated) DEVICE — TUBING, SUCTION, 1/4" X 10', STRAIGHT: Brand: MEDLINE

## (undated) DEVICE — CATH DIAG EXPO .056 FL3.5 6F 100CM

## (undated) DEVICE — CVR HNDL LIGHT RIGID

## (undated) DEVICE — RADIFOCUS GLIDEWIRE: Brand: GLIDEWIRE

## (undated) DEVICE — KT ORCA ORCAPOD DISP STRL

## (undated) DEVICE — ANTIBACTERIAL UNDYED BRAIDED (POLYGLACTIN 910), SYNTHETIC ABSORBABLE SUTURE: Brand: COATED VICRYL

## (undated) DEVICE — SUT PROLN 5/0 C1 D/A 36IN 8720H

## (undated) DEVICE — Device: Brand: ASAHI SION BLUE

## (undated) DEVICE — DECANT BG O JET

## (undated) DEVICE — MODEL BT2000 P/N 700287-012KIT CONTENTS: MANIFOLD WITH SALINE AND CONTRAST PORTS, SALINE TUBING WITH SPIKE AND HAND SYRINGE, TRANSDUCER: Brand: BT2000 AUTOMATED MANIFOLD KIT

## (undated) DEVICE — SNAR POLYP SENSATION JUMBO OVL 30 240X20

## (undated) DEVICE — SAFELINER SUCTION CANISTER 1500CC: Brand: DEROYAL

## (undated) DEVICE — ST INF PRI SMRTSTE 20DRP 2VLV 24ML 117

## (undated) DEVICE — CATH GUIDE BERN 5F 65CM

## (undated) DEVICE — SPNG VERSALON 4X4 4PLY NONSTRL LF BG/200

## (undated) DEVICE — 3M™ STERI-DRAPE™ INSTRUMENT POUCH 1018: Brand: STERI-DRAPE™

## (undated) DEVICE — HI-TORQUE WHISPER MS GUIDE WIRE .014 STRAIGHT TIP 3.0 CM X 190 CM: Brand: HI-TORQUE WHISPER

## (undated) DEVICE — SYR LL TP 10ML STRL

## (undated) DEVICE — SPNG ENDO BEDSIDE TUB ENZYM

## (undated) DEVICE — LUBE GEL ENDOGLIDE 1.1OZ

## (undated) DEVICE — SYR LUERLOK 50ML

## (undated) DEVICE — SOL IRR H2O BTL 1000ML STRL

## (undated) DEVICE — THE BITE BLOCK MAXI, LATEX FREE STRAP IS USED TO PROTECT THE ENDOSCOPE INSERTION TUBE FROM BEING BITTEN BY THE PATIENT.

## (undated) DEVICE — INTRO ACCSR BLNT TP

## (undated) DEVICE — GW AMPLTZ SUPERSTIFF STR .035IN 180CM

## (undated) DEVICE — GRADUATE CONTN 1000ML

## (undated) DEVICE — HYBRID CO2 TUBING/CAP SET FOR OLYMPUS® SCOPES & CO2 SOURCE: Brand: ERBE

## (undated) DEVICE — SPNG GZ WOVN 4X4IN 12PLY 10/BX STRL

## (undated) DEVICE — BALN STENTGR Q50

## (undated) DEVICE — GUIDE CATHETER: Brand: MACH1™

## (undated) DEVICE — TBG INJ CONTRL EXCITE RA 1200PSI 48IN

## (undated) DEVICE — KT VLV HEMO MAP ACC PLS LG/BORE MTL/INTRO W/TORQ/DEV

## (undated) DEVICE — DEV INFL MONARCH 25W

## (undated) DEVICE — 3M™ IOBAN™ 2 ANTIMICROBIAL INCISE DRAPE 6651EZ: Brand: IOBAN™ 2

## (undated) DEVICE — BOWL UTIL STRL 32OZ

## (undated) DEVICE — DRAPE,TOP,102X53,STERILE: Brand: MEDLINE

## (undated) DEVICE — SINGLE-USE BIOPSY FORCEPS: Brand: RADIAL JAW 4

## (undated) DEVICE — GLIDEX™ COATED HYDROPHILIC GUIDEWIRE: Brand: MAGIC TORQUE™

## (undated) DEVICE — INTRO SHEATH DRYSEAL FLEX 18F 6.0TO6.7MM 33CM

## (undated) DEVICE — LUER-LOK 360°: Brand: CONNECTA, LUER-LOK

## (undated) DEVICE — Device

## (undated) DEVICE — PK VASC 10

## (undated) DEVICE — SYR LUER SLPTP 50ML

## (undated) DEVICE — TRAP,MUCUS SPECIMEN,40CC: Brand: MEDLINE

## (undated) DEVICE — CATH SZ ACCUVU SEG/20CM PIG .038IN 5F 70CM

## (undated) DEVICE — PERCLOSE PROGLIDE™ SUTURE-MEDIATED CLOSURE SYSTEM: Brand: PERCLOSE PROGLIDE™

## (undated) DEVICE — SOL IRR NACL 0.9PCT BT 1000ML

## (undated) DEVICE — PK CATH CARD 10

## (undated) DEVICE — SUT PROLN 6/0 C1 D/A 30IN 8706H

## (undated) DEVICE — MODEL AT P65, P/N 701554-001KIT CONTENTS: HAND CONTROLLER, 3-WAY HIGH-PRESSURE STOPCOCK WITH ROTATING END AND PREMIUM HIGH-PRESSURE TUBING: Brand: ANGIOTOUCH® KIT

## (undated) DEVICE — SUT SILK 3/0 TIES 18IN A184H

## (undated) DEVICE — SKIN AFFIX SURG ADHESIVE 72/CS 0.55ML: Brand: MEDLINE

## (undated) DEVICE — SUT SILK 2/0 TIES 18IN A185H

## (undated) DEVICE — 3M™ STERI-DRAPE™ FLUOROSCOPE DRAPE, 10 PER CARTON / 4 CARTONS PER CASE, 1012: Brand: STERI-DRAPE™

## (undated) DEVICE — TREK CORONARY DILATATION CATHETER 2.50 MM X 12 MM / RAPID-EXCHANGE: Brand: TREK

## (undated) DEVICE — GUIDELINER CATHETERS ARE INTENDED TO BE USED IN CONJUNCTION WITH GUIDE CATHETERS TO ACCESS DISCRETE REGIONS OF THE CORONARY AND/OR PERIPHERAL VASCULATURE, AND TO FACILITATE PLACEMENT OF INTERVENTIONAL DEVICES.: Brand: GUIDELINER® V3 CATHETER

## (undated) DEVICE — CONTN GRAD MEAS TRIANG 32OZ BLK

## (undated) DEVICE — DEV COMP RAD PRELUDESYNC 24CM

## (undated) DEVICE — INTRO SHEATH DRYSEAL FLEX 12F4TO4.7MM 33CM

## (undated) DEVICE — NDL PERC 1PRT THNWALL W/BASEPLT 18G 7CM

## (undated) DEVICE — SINGLE USE SUCTION VALVE MAJ-209: Brand: SINGLE USE SUCTION VALVE (STERILE)

## (undated) DEVICE — SI AVANTI+ 7F STD W/GW  NO OBT: Brand: AVANTI

## (undated) DEVICE — CATH DIAG EXPO M/ PK 6FR FL4/FR4 PIG 3PK

## (undated) DEVICE — GLV SURG SENSICARE PI MIC PF SZ7 LF STRL